# Patient Record
Sex: FEMALE | Race: BLACK OR AFRICAN AMERICAN | Employment: OTHER | URBAN - METROPOLITAN AREA
[De-identification: names, ages, dates, MRNs, and addresses within clinical notes are randomized per-mention and may not be internally consistent; named-entity substitution may affect disease eponyms.]

---

## 2017-10-16 ENCOUNTER — HOSPITAL ENCOUNTER (OUTPATIENT)
Dept: PREADMISSION TESTING | Age: 67
Discharge: HOME | End: 2017-10-16
Payer: MEDICARE

## 2017-10-16 VITALS
BODY MASS INDEX: 28.93 KG/M2 | WEIGHT: 180 LBS | OXYGEN SATURATION: 100 % | TEMPERATURE: 97.8 F | SYSTOLIC BLOOD PRESSURE: 152 MMHG | HEART RATE: 66 BPM | DIASTOLIC BLOOD PRESSURE: 78 MMHG | HEIGHT: 66 IN

## 2017-10-16 DIAGNOSIS — R73.03 PREDIABETES: ICD-10-CM

## 2017-10-16 LAB
ABO + RH BLD: NORMAL
ANION GAP SERPL CALC-SCNC: 6 MMOL/L (ref 5–15)
APPEARANCE UR: CLEAR
ATRIAL RATE: 58 BPM
BACTERIA URNS QL MICRO: NEGATIVE /HPF
BILIRUB UR QL: NEGATIVE
BLOOD GROUP ANTIBODIES SERPL: NORMAL
BUN SERPL-MCNC: 27 MG/DL (ref 6–20)
BUN/CREAT SERPL: 25 (ref 12–20)
CALCIUM SERPL-MCNC: 9 MG/DL (ref 8.5–10.1)
CALCULATED P AXIS, ECG09: 33 DEGREES
CALCULATED R AXIS, ECG10: -7 DEGREES
CALCULATED T AXIS, ECG11: 32 DEGREES
CHLORIDE SERPL-SCNC: 105 MMOL/L (ref 97–108)
CO2 SERPL-SCNC: 31 MMOL/L (ref 21–32)
COLOR UR: NORMAL
CREAT SERPL-MCNC: 1.06 MG/DL (ref 0.55–1.02)
DIAGNOSIS, 93000: NORMAL
EPITH CASTS URNS QL MICRO: NORMAL /LPF
ERYTHROCYTE [DISTWIDTH] IN BLOOD BY AUTOMATED COUNT: 13.7 % (ref 11.5–14.5)
EST. AVERAGE GLUCOSE BLD GHB EST-MCNC: 134 MG/DL
GLUCOSE SERPL-MCNC: 87 MG/DL (ref 65–100)
GLUCOSE UR STRIP.AUTO-MCNC: NEGATIVE MG/DL
HBA1C MFR BLD: 6.3 % (ref 4.2–6.3)
HCT VFR BLD AUTO: 36.5 % (ref 35–47)
HGB BLD-MCNC: 11.5 G/DL (ref 11.5–16)
HGB UR QL STRIP: NEGATIVE
HYALINE CASTS URNS QL MICRO: NORMAL /LPF (ref 0–5)
INR PPP: 1 (ref 0.9–1.1)
KETONES UR QL STRIP.AUTO: NEGATIVE MG/DL
LEUKOCYTE ESTERASE UR QL STRIP.AUTO: NEGATIVE
MCH RBC QN AUTO: 28.1 PG (ref 26–34)
MCHC RBC AUTO-ENTMCNC: 31.5 G/DL (ref 30–36.5)
MCV RBC AUTO: 89.2 FL (ref 80–99)
NITRITE UR QL STRIP.AUTO: NEGATIVE
P-R INTERVAL, ECG05: 172 MS
PH UR STRIP: 7 [PH] (ref 5–8)
PLATELET # BLD AUTO: 288 K/UL (ref 150–400)
POTASSIUM SERPL-SCNC: 3.9 MMOL/L (ref 3.5–5.1)
PROT UR STRIP-MCNC: NEGATIVE MG/DL
PROTHROMBIN TIME: 10.5 SEC (ref 9–11.1)
Q-T INTERVAL, ECG07: 440 MS
QRS DURATION, ECG06: 78 MS
QTC CALCULATION (BEZET), ECG08: 431 MS
RBC # BLD AUTO: 4.09 M/UL (ref 3.8–5.2)
RBC #/AREA URNS HPF: NORMAL /HPF (ref 0–5)
SODIUM SERPL-SCNC: 142 MMOL/L (ref 136–145)
SP GR UR REFRACTOMETRY: 1.01 (ref 1–1.03)
SPECIMEN EXP DATE BLD: NORMAL
UA: UC IF INDICATED,UAUC: NORMAL
UROBILINOGEN UR QL STRIP.AUTO: 0.2 EU/DL (ref 0.2–1)
VENTRICULAR RATE, ECG03: 58 BPM
WBC # BLD AUTO: 6.1 K/UL (ref 3.6–11)
WBC URNS QL MICRO: NORMAL /HPF (ref 0–4)

## 2017-10-16 PROCEDURE — 36415 COLL VENOUS BLD VENIPUNCTURE: CPT | Performed by: ORTHOPAEDIC SURGERY

## 2017-10-16 PROCEDURE — 85027 COMPLETE CBC AUTOMATED: CPT | Performed by: ORTHOPAEDIC SURGERY

## 2017-10-16 PROCEDURE — 86900 BLOOD TYPING SEROLOGIC ABO: CPT | Performed by: ORTHOPAEDIC SURGERY

## 2017-10-16 PROCEDURE — 85610 PROTHROMBIN TIME: CPT | Performed by: ORTHOPAEDIC SURGERY

## 2017-10-16 PROCEDURE — 93005 ELECTROCARDIOGRAM TRACING: CPT

## 2017-10-16 PROCEDURE — 83036 HEMOGLOBIN GLYCOSYLATED A1C: CPT | Performed by: ORTHOPAEDIC SURGERY

## 2017-10-16 PROCEDURE — 80048 BASIC METABOLIC PNL TOTAL CA: CPT | Performed by: ORTHOPAEDIC SURGERY

## 2017-10-16 PROCEDURE — 81001 URINALYSIS AUTO W/SCOPE: CPT | Performed by: ORTHOPAEDIC SURGERY

## 2017-10-16 RX ORDER — DICLOFENAC SODIUM 50 MG/1
1 TABLET, DELAYED RELEASE ORAL
COMMUNITY
Start: 2017-03-14 | End: 2019-03-22

## 2017-10-16 RX ORDER — ASPIRIN 81 MG/1
81 TABLET ORAL DAILY
COMMUNITY
End: 2017-11-02

## 2017-10-16 RX ORDER — TRAMADOL HYDROCHLORIDE 50 MG/1
50 TABLET ORAL
COMMUNITY
End: 2017-11-02

## 2017-10-16 RX ORDER — ATORVASTATIN CALCIUM 20 MG/1
20 TABLET, FILM COATED ORAL
COMMUNITY
End: 2017-10-16

## 2017-10-17 PROBLEM — R73.03 PREDIABETES: Status: ACTIVE | Noted: 2017-10-17

## 2017-10-17 PROBLEM — D47.2 MONOCLONAL PARAPROTEINEMIA: Status: ACTIVE | Noted: 2017-10-17

## 2017-10-17 LAB
BACTERIA SPEC CULT: NORMAL
BACTERIA SPEC CULT: NORMAL
SERVICE CMNT-IMP: NORMAL

## 2017-10-28 ENCOUNTER — ANESTHESIA EVENT (OUTPATIENT)
Dept: SURGERY | Age: 67
DRG: 470 | End: 2017-10-28
Payer: MEDICARE

## 2017-10-30 ENCOUNTER — ANESTHESIA (OUTPATIENT)
Dept: SURGERY | Age: 67
DRG: 470 | End: 2017-10-30
Payer: MEDICARE

## 2017-10-30 ENCOUNTER — HOSPITAL ENCOUNTER (INPATIENT)
Age: 67
LOS: 3 days | Discharge: HOME HEALTH | DRG: 470 | End: 2017-11-02
Attending: ORTHOPAEDIC SURGERY | Admitting: ORTHOPAEDIC SURGERY
Payer: MEDICARE

## 2017-10-30 PROBLEM — M17.12 OSTEOARTHRITIS OF LEFT KNEE: Status: ACTIVE | Noted: 2017-10-30

## 2017-10-30 LAB
GLUCOSE BLD STRIP.AUTO-MCNC: 71 MG/DL (ref 65–100)
GLUCOSE BLD STRIP.AUTO-MCNC: 75 MG/DL (ref 65–100)
GLUCOSE BLD STRIP.AUTO-MCNC: 85 MG/DL (ref 65–100)
GLUCOSE BLD STRIP.AUTO-MCNC: 88 MG/DL (ref 65–100)
SERVICE CMNT-IMP: NORMAL

## 2017-10-30 PROCEDURE — 76010000171 HC OR TIME 2 TO 2.5 HR INTENSV-TIER 1: Performed by: ORTHOPAEDIC SURGERY

## 2017-10-30 PROCEDURE — 77030028224 HC PDNG CST BSNM -A: Performed by: ORTHOPAEDIC SURGERY

## 2017-10-30 PROCEDURE — 77030007866 HC KT SPN ANES BBMI -B: Performed by: ANESTHESIOLOGY

## 2017-10-30 PROCEDURE — 74011000250 HC RX REV CODE- 250: Performed by: ORTHOPAEDIC SURGERY

## 2017-10-30 PROCEDURE — C1713 ANCHOR/SCREW BN/BN,TIS/BN: HCPCS | Performed by: ORTHOPAEDIC SURGERY

## 2017-10-30 PROCEDURE — 74011250637 HC RX REV CODE- 250/637: Performed by: ORTHOPAEDIC SURGERY

## 2017-10-30 PROCEDURE — 76210000017 HC OR PH I REC 1.5 TO 2 HR: Performed by: ORTHOPAEDIC SURGERY

## 2017-10-30 PROCEDURE — C9290 INJ, BUPIVACAINE LIPOSOME: HCPCS | Performed by: ORTHOPAEDIC SURGERY

## 2017-10-30 PROCEDURE — 77030011640 HC PAD GRND REM COVD -A: Performed by: ORTHOPAEDIC SURGERY

## 2017-10-30 PROCEDURE — 77030002933 HC SUT MCRYL J&J -A: Performed by: ORTHOPAEDIC SURGERY

## 2017-10-30 PROCEDURE — 77030008467 HC STPLR SKN COVD -B: Performed by: ORTHOPAEDIC SURGERY

## 2017-10-30 PROCEDURE — 77030018846 HC SOL IRR STRL H20 ICUM -A: Performed by: ORTHOPAEDIC SURGERY

## 2017-10-30 PROCEDURE — 82962 GLUCOSE BLOOD TEST: CPT

## 2017-10-30 PROCEDURE — 74011250636 HC RX REV CODE- 250/636

## 2017-10-30 PROCEDURE — 74011000258 HC RX REV CODE- 258: Performed by: ORTHOPAEDIC SURGERY

## 2017-10-30 PROCEDURE — 77030006822 HC BLD SAW SAG BRSM -B: Performed by: ORTHOPAEDIC SURGERY

## 2017-10-30 PROCEDURE — 74011250636 HC RX REV CODE- 250/636: Performed by: ORTHOPAEDIC SURGERY

## 2017-10-30 PROCEDURE — 74011000272 HC RX REV CODE- 272: Performed by: ORTHOPAEDIC SURGERY

## 2017-10-30 PROCEDURE — 65270000029 HC RM PRIVATE

## 2017-10-30 PROCEDURE — 77030018822 HC SLV COMPR FT COVD -B

## 2017-10-30 PROCEDURE — 74011000250 HC RX REV CODE- 250

## 2017-10-30 PROCEDURE — 77030012935 HC DRSG AQUACEL BMS -B: Performed by: ORTHOPAEDIC SURGERY

## 2017-10-30 PROCEDURE — 77030031139 HC SUT VCRL2 J&J -A: Performed by: ORTHOPAEDIC SURGERY

## 2017-10-30 PROCEDURE — 74011250636 HC RX REV CODE- 250/636: Performed by: ANESTHESIOLOGY

## 2017-10-30 PROCEDURE — 77030034850: Performed by: ORTHOPAEDIC SURGERY

## 2017-10-30 PROCEDURE — 76060000035 HC ANESTHESIA 2 TO 2.5 HR: Performed by: ORTHOPAEDIC SURGERY

## 2017-10-30 PROCEDURE — 77030018836 HC SOL IRR NACL ICUM -A: Performed by: ORTHOPAEDIC SURGERY

## 2017-10-30 PROCEDURE — 0SRD0J9 REPLACEMENT OF LEFT KNEE JOINT WITH SYNTHETIC SUBSTITUTE, CEMENTED, OPEN APPROACH: ICD-10-PCS | Performed by: ORTHOPAEDIC SURGERY

## 2017-10-30 PROCEDURE — 77030020788: Performed by: ORTHOPAEDIC SURGERY

## 2017-10-30 PROCEDURE — 77030010783 HC BOWL MX BN CEM J&J -B: Performed by: ORTHOPAEDIC SURGERY

## 2017-10-30 PROCEDURE — 77030000032 HC CUF TRNQT ZIMM -B: Performed by: ORTHOPAEDIC SURGERY

## 2017-10-30 PROCEDURE — C1776 JOINT DEVICE (IMPLANTABLE): HCPCS | Performed by: ORTHOPAEDIC SURGERY

## 2017-10-30 DEVICE — COMPONENT ARTC SURF CR 3-4 UNIV 42X56X10 MM KNEE CONSTRN YEL: Type: IMPLANTABLE DEVICE | Site: KNEE | Status: FUNCTIONAL

## 2017-10-30 DEVICE — PLATE TIB STEM PC NXGN SZ 3 --: Type: IMPLANTABLE DEVICE | Site: KNEE | Status: FUNCTIONAL

## 2017-10-30 DEVICE — KNEE SYS GSF FLX CEM FEM/TIB F -- K1 BSV-SC FLX ART SURF STD PAT: Type: IMPLANTABLE DEVICE | Status: FUNCTIONAL

## 2017-10-30 DEVICE — PLUG STEM TIV FLX TAPR FOR MG II ST BNE SCR NXGN: Type: IMPLANTABLE DEVICE | Site: KNEE | Status: FUNCTIONAL

## 2017-10-30 DEVICE — PAT STD POLYETH NXGN 8X29MM --: Type: IMPLANTABLE DEVICE | Site: KNEE | Status: FUNCTIONAL

## 2017-10-30 DEVICE — COMPONENT FEM SZ -E L KNEE ZMLY PMMA PC PRI PRESSFIT CRUCE: Type: IMPLANTABLE DEVICE | Site: KNEE | Status: FUNCTIONAL

## 2017-10-30 DEVICE — CEMENT BNE 40GM FULL DOSE PMMA W/ GENT HI VISC RADPQ LNG: Type: IMPLANTABLE DEVICE | Site: KNEE | Status: FUNCTIONAL

## 2017-10-30 RX ORDER — AMLODIPINE BESYLATE 5 MG/1
5 TABLET ORAL DAILY
Status: DISCONTINUED | OUTPATIENT
Start: 2017-10-31 | End: 2017-11-02 | Stop reason: HOSPADM

## 2017-10-30 RX ORDER — POTASSIUM CHLORIDE 750 MG/1
10-20 TABLET, FILM COATED, EXTENDED RELEASE ORAL DAILY
Status: DISCONTINUED | OUTPATIENT
Start: 2017-10-31 | End: 2017-10-31

## 2017-10-30 RX ORDER — ONDANSETRON 2 MG/ML
4 INJECTION INTRAMUSCULAR; INTRAVENOUS AS NEEDED
Status: DISCONTINUED | OUTPATIENT
Start: 2017-10-30 | End: 2017-10-30 | Stop reason: HOSPADM

## 2017-10-30 RX ORDER — LEVETIRACETAM 500 MG/1
1000 TABLET ORAL
Status: DISCONTINUED | OUTPATIENT
Start: 2017-10-30 | End: 2017-11-02 | Stop reason: HOSPADM

## 2017-10-30 RX ORDER — BUPIVACAINE HYDROCHLORIDE 7.5 MG/ML
INJECTION, SOLUTION EPIDURAL; RETROBULBAR AS NEEDED
Status: DISCONTINUED | OUTPATIENT
Start: 2017-10-30 | End: 2017-10-30 | Stop reason: HOSPADM

## 2017-10-30 RX ORDER — SODIUM CHLORIDE 0.9 % (FLUSH) 0.9 %
5-10 SYRINGE (ML) INJECTION AS NEEDED
Status: DISCONTINUED | OUTPATIENT
Start: 2017-10-30 | End: 2017-11-02 | Stop reason: HOSPADM

## 2017-10-30 RX ORDER — CELECOXIB 200 MG/1
200 CAPSULE ORAL ONCE
Status: COMPLETED | OUTPATIENT
Start: 2017-10-30 | End: 2017-10-30

## 2017-10-30 RX ORDER — HYDROMORPHONE HYDROCHLORIDE 1 MG/ML
0.2 INJECTION, SOLUTION INTRAMUSCULAR; INTRAVENOUS; SUBCUTANEOUS
Status: DISCONTINUED | OUTPATIENT
Start: 2017-10-30 | End: 2017-10-30 | Stop reason: HOSPADM

## 2017-10-30 RX ORDER — MORPHINE SULFATE 10 MG/ML
2 INJECTION, SOLUTION INTRAMUSCULAR; INTRAVENOUS
Status: DISCONTINUED | OUTPATIENT
Start: 2017-10-30 | End: 2017-10-30 | Stop reason: HOSPADM

## 2017-10-30 RX ORDER — SODIUM CHLORIDE, SODIUM LACTATE, POTASSIUM CHLORIDE, CALCIUM CHLORIDE 600; 310; 30; 20 MG/100ML; MG/100ML; MG/100ML; MG/100ML
INJECTION, SOLUTION INTRAVENOUS
Status: DISCONTINUED | OUTPATIENT
Start: 2017-10-30 | End: 2017-10-30 | Stop reason: HOSPADM

## 2017-10-30 RX ORDER — WARFARIN SODIUM 5 MG/1
5 TABLET ORAL ONCE
Status: COMPLETED | OUTPATIENT
Start: 2017-10-30 | End: 2017-10-30

## 2017-10-30 RX ORDER — LIDOCAINE HYDROCHLORIDE 20 MG/ML
INJECTION, SOLUTION EPIDURAL; INFILTRATION; INTRACAUDAL; PERINEURAL AS NEEDED
Status: DISCONTINUED | OUTPATIENT
Start: 2017-10-30 | End: 2017-10-30 | Stop reason: HOSPADM

## 2017-10-30 RX ORDER — LIDOCAINE HYDROCHLORIDE 10 MG/ML
0.1 INJECTION, SOLUTION EPIDURAL; INFILTRATION; INTRACAUDAL; PERINEURAL AS NEEDED
Status: DISCONTINUED | OUTPATIENT
Start: 2017-10-30 | End: 2017-10-30 | Stop reason: HOSPADM

## 2017-10-30 RX ORDER — ONDANSETRON 2 MG/ML
4 INJECTION INTRAMUSCULAR; INTRAVENOUS
Status: DISPENSED | OUTPATIENT
Start: 2017-10-30 | End: 2017-10-31

## 2017-10-30 RX ORDER — OXYCODONE HYDROCHLORIDE 5 MG/1
5 TABLET ORAL
Status: DISCONTINUED | OUTPATIENT
Start: 2017-10-30 | End: 2017-11-02

## 2017-10-30 RX ORDER — FENTANYL CITRATE 50 UG/ML
50 INJECTION, SOLUTION INTRAMUSCULAR; INTRAVENOUS AS NEEDED
Status: DISCONTINUED | OUTPATIENT
Start: 2017-10-30 | End: 2017-10-30 | Stop reason: HOSPADM

## 2017-10-30 RX ORDER — FACIAL-BODY WIPES
10 EACH TOPICAL DAILY PRN
Status: DISCONTINUED | OUTPATIENT
Start: 2017-11-01 | End: 2017-11-02 | Stop reason: HOSPADM

## 2017-10-30 RX ORDER — MIDAZOLAM HYDROCHLORIDE 1 MG/ML
0.5 INJECTION, SOLUTION INTRAMUSCULAR; INTRAVENOUS
Status: DISCONTINUED | OUTPATIENT
Start: 2017-10-30 | End: 2017-10-30 | Stop reason: HOSPADM

## 2017-10-30 RX ORDER — CEFAZOLIN SODIUM IN 0.9 % NACL 2 G/50 ML
2 INTRAVENOUS SOLUTION, PIGGYBACK (ML) INTRAVENOUS ONCE
Status: COMPLETED | OUTPATIENT
Start: 2017-10-30 | End: 2017-10-30

## 2017-10-30 RX ORDER — SODIUM CHLORIDE 0.9 % (FLUSH) 0.9 %
5-10 SYRINGE (ML) INJECTION AS NEEDED
Status: DISCONTINUED | OUTPATIENT
Start: 2017-10-30 | End: 2017-10-30 | Stop reason: HOSPADM

## 2017-10-30 RX ORDER — SODIUM CHLORIDE 9 MG/ML
25 INJECTION, SOLUTION INTRAVENOUS CONTINUOUS
Status: DISCONTINUED | OUTPATIENT
Start: 2017-10-30 | End: 2017-10-30 | Stop reason: HOSPADM

## 2017-10-30 RX ORDER — CEFAZOLIN SODIUM IN 0.9 % NACL 2 G/50 ML
2 INTRAVENOUS SOLUTION, PIGGYBACK (ML) INTRAVENOUS EVERY 8 HOURS
Status: COMPLETED | OUTPATIENT
Start: 2017-10-30 | End: 2017-10-31

## 2017-10-30 RX ORDER — DIPHENHYDRAMINE HYDROCHLORIDE 50 MG/ML
12.5 INJECTION, SOLUTION INTRAMUSCULAR; INTRAVENOUS AS NEEDED
Status: DISCONTINUED | OUTPATIENT
Start: 2017-10-30 | End: 2017-10-30 | Stop reason: HOSPADM

## 2017-10-30 RX ORDER — PANTOPRAZOLE SODIUM 40 MG/1
40 TABLET, DELAYED RELEASE ORAL
Status: DISCONTINUED | OUTPATIENT
Start: 2017-10-31 | End: 2017-11-02 | Stop reason: HOSPADM

## 2017-10-30 RX ORDER — DEXAMETHASONE SODIUM PHOSPHATE 4 MG/ML
4 INJECTION, SOLUTION INTRA-ARTICULAR; INTRALESIONAL; INTRAMUSCULAR; INTRAVENOUS; SOFT TISSUE ONCE
Status: DISCONTINUED | OUTPATIENT
Start: 2017-10-30 | End: 2017-10-30 | Stop reason: HOSPADM

## 2017-10-30 RX ORDER — AMOXICILLIN 250 MG
1 CAPSULE ORAL 2 TIMES DAILY
Status: DISCONTINUED | OUTPATIENT
Start: 2017-10-30 | End: 2017-11-02 | Stop reason: HOSPADM

## 2017-10-30 RX ORDER — HYDROMORPHONE HYDROCHLORIDE 1 MG/ML
0.5 INJECTION, SOLUTION INTRAMUSCULAR; INTRAVENOUS; SUBCUTANEOUS
Status: DISPENSED | OUTPATIENT
Start: 2017-10-30 | End: 2017-10-31

## 2017-10-30 RX ORDER — SODIUM CHLORIDE 9 MG/ML
1000 INJECTION, SOLUTION INTRAVENOUS CONTINUOUS
Status: DISCONTINUED | OUTPATIENT
Start: 2017-10-30 | End: 2017-10-30 | Stop reason: HOSPADM

## 2017-10-30 RX ORDER — ONDANSETRON 2 MG/ML
INJECTION INTRAMUSCULAR; INTRAVENOUS AS NEEDED
Status: DISCONTINUED | OUTPATIENT
Start: 2017-10-30 | End: 2017-10-30 | Stop reason: HOSPADM

## 2017-10-30 RX ORDER — ACETAMINOPHEN 325 MG/1
650 TABLET ORAL
Status: DISCONTINUED | OUTPATIENT
Start: 2017-10-30 | End: 2017-10-31

## 2017-10-30 RX ORDER — SODIUM CHLORIDE, SODIUM LACTATE, POTASSIUM CHLORIDE, CALCIUM CHLORIDE 600; 310; 30; 20 MG/100ML; MG/100ML; MG/100ML; MG/100ML
25 INJECTION, SOLUTION INTRAVENOUS CONTINUOUS
Status: DISCONTINUED | OUTPATIENT
Start: 2017-10-30 | End: 2017-10-30 | Stop reason: HOSPADM

## 2017-10-30 RX ORDER — FENTANYL CITRATE 50 UG/ML
25 INJECTION, SOLUTION INTRAMUSCULAR; INTRAVENOUS
Status: COMPLETED | OUTPATIENT
Start: 2017-10-30 | End: 2017-10-30

## 2017-10-30 RX ORDER — SODIUM CHLORIDE 0.9 % (FLUSH) 0.9 %
5-10 SYRINGE (ML) INJECTION EVERY 8 HOURS
Status: DISCONTINUED | OUTPATIENT
Start: 2017-10-30 | End: 2017-10-30 | Stop reason: HOSPADM

## 2017-10-30 RX ORDER — DEXTROSE, SODIUM CHLORIDE, SODIUM LACTATE, POTASSIUM CHLORIDE, AND CALCIUM CHLORIDE 5; .6; .31; .03; .02 G/100ML; G/100ML; G/100ML; G/100ML; G/100ML
25 INJECTION, SOLUTION INTRAVENOUS CONTINUOUS
Status: DISCONTINUED | OUTPATIENT
Start: 2017-10-30 | End: 2017-10-30 | Stop reason: HOSPADM

## 2017-10-30 RX ORDER — PROPOFOL 10 MG/ML
INJECTION, EMULSION INTRAVENOUS
Status: DISCONTINUED | OUTPATIENT
Start: 2017-10-30 | End: 2017-10-30 | Stop reason: HOSPADM

## 2017-10-30 RX ORDER — MIDAZOLAM HYDROCHLORIDE 1 MG/ML
INJECTION, SOLUTION INTRAMUSCULAR; INTRAVENOUS AS NEEDED
Status: DISCONTINUED | OUTPATIENT
Start: 2017-10-30 | End: 2017-10-30 | Stop reason: HOSPADM

## 2017-10-30 RX ORDER — OXYCODONE HYDROCHLORIDE 5 MG/1
10 TABLET ORAL
Status: DISCONTINUED | OUTPATIENT
Start: 2017-10-30 | End: 2017-11-02

## 2017-10-30 RX ORDER — MIDAZOLAM HYDROCHLORIDE 1 MG/ML
1 INJECTION, SOLUTION INTRAMUSCULAR; INTRAVENOUS AS NEEDED
Status: DISCONTINUED | OUTPATIENT
Start: 2017-10-30 | End: 2017-10-30 | Stop reason: HOSPADM

## 2017-10-30 RX ORDER — SODIUM CHLORIDE 0.9 % (FLUSH) 0.9 %
5-10 SYRINGE (ML) INJECTION EVERY 8 HOURS
Status: DISCONTINUED | OUTPATIENT
Start: 2017-10-31 | End: 2017-11-02 | Stop reason: HOSPADM

## 2017-10-30 RX ORDER — SODIUM CHLORIDE 9 MG/ML
125 INJECTION, SOLUTION INTRAVENOUS CONTINUOUS
Status: DISPENSED | OUTPATIENT
Start: 2017-10-30 | End: 2017-10-31

## 2017-10-30 RX ORDER — NALOXONE HYDROCHLORIDE 0.4 MG/ML
0.4 INJECTION, SOLUTION INTRAMUSCULAR; INTRAVENOUS; SUBCUTANEOUS AS NEEDED
Status: DISCONTINUED | OUTPATIENT
Start: 2017-10-30 | End: 2017-11-02 | Stop reason: HOSPADM

## 2017-10-30 RX ORDER — ACETAMINOPHEN 500 MG
1000 TABLET ORAL ONCE
Status: COMPLETED | OUTPATIENT
Start: 2017-10-30 | End: 2017-10-30

## 2017-10-30 RX ORDER — POLYETHYLENE GLYCOL 3350 17 G/17G
17 POWDER, FOR SOLUTION ORAL DAILY
Status: DISCONTINUED | OUTPATIENT
Start: 2017-10-31 | End: 2017-11-02 | Stop reason: HOSPADM

## 2017-10-30 RX ADMIN — SODIUM CHLORIDE, SODIUM LACTATE, POTASSIUM CHLORIDE, CALCIUM CHLORIDE: 600; 310; 30; 20 INJECTION, SOLUTION INTRAVENOUS at 14:20

## 2017-10-30 RX ADMIN — FENTANYL CITRATE 25 MCG: 50 INJECTION, SOLUTION INTRAMUSCULAR; INTRAVENOUS at 17:12

## 2017-10-30 RX ADMIN — CELECOXIB 200 MG: 200 CAPSULE ORAL at 13:54

## 2017-10-30 RX ADMIN — CEFAZOLIN 2 G: 1 INJECTION, POWDER, FOR SOLUTION INTRAMUSCULAR; INTRAVENOUS; PARENTERAL at 14:47

## 2017-10-30 RX ADMIN — MIDAZOLAM HYDROCHLORIDE 2 MG: 1 INJECTION, SOLUTION INTRAMUSCULAR; INTRAVENOUS at 14:08

## 2017-10-30 RX ADMIN — FENTANYL CITRATE 25 MCG: 50 INJECTION, SOLUTION INTRAMUSCULAR; INTRAVENOUS at 17:58

## 2017-10-30 RX ADMIN — FENTANYL CITRATE 25 MCG: 50 INJECTION, SOLUTION INTRAMUSCULAR; INTRAVENOUS at 17:17

## 2017-10-30 RX ADMIN — FENTANYL CITRATE 25 MCG: 50 INJECTION, SOLUTION INTRAMUSCULAR; INTRAVENOUS at 17:32

## 2017-10-30 RX ADMIN — MIDAZOLAM HYDROCHLORIDE 1 MG: 1 INJECTION, SOLUTION INTRAMUSCULAR; INTRAVENOUS at 15:38

## 2017-10-30 RX ADMIN — SODIUM CHLORIDE 125 ML/HR: 900 INJECTION, SOLUTION INTRAVENOUS at 18:22

## 2017-10-30 RX ADMIN — WARFARIN SODIUM 5 MG: 5 TABLET ORAL at 19:29

## 2017-10-30 RX ADMIN — MEPERIDINE HYDROCHLORIDE 12.5 MG: 25 INJECTION INTRAMUSCULAR; INTRAVENOUS; SUBCUTANEOUS at 17:08

## 2017-10-30 RX ADMIN — LIDOCAINE HYDROCHLORIDE 60 MG: 20 INJECTION, SOLUTION EPIDURAL; INFILTRATION; INTRACAUDAL; PERINEURAL at 14:48

## 2017-10-30 RX ADMIN — SODIUM CHLORIDE, SODIUM LACTATE, POTASSIUM CHLORIDE, AND CALCIUM CHLORIDE 25 ML/HR: 600; 310; 30; 20 INJECTION, SOLUTION INTRAVENOUS at 13:50

## 2017-10-30 RX ADMIN — ACETAMINOPHEN 1000 MG: 500 TABLET, FILM COATED ORAL at 13:54

## 2017-10-30 RX ADMIN — LEVETIRACETAM 1000 MG: 500 TABLET, FILM COATED ORAL at 21:45

## 2017-10-30 RX ADMIN — STANDARDIZED SENNA CONCENTRATE AND DOCUSATE SODIUM 1 TABLET: 8.6; 5 TABLET, FILM COATED ORAL at 19:29

## 2017-10-30 RX ADMIN — HYDROMORPHONE HYDROCHLORIDE 0.5 MG: 1 INJECTION, SOLUTION INTRAMUSCULAR; INTRAVENOUS; SUBCUTANEOUS at 19:29

## 2017-10-30 RX ADMIN — ONDANSETRON 4 MG: 2 INJECTION INTRAMUSCULAR; INTRAVENOUS at 20:24

## 2017-10-30 RX ADMIN — MIDAZOLAM HYDROCHLORIDE 1 MG: 1 INJECTION, SOLUTION INTRAMUSCULAR; INTRAVENOUS at 14:23

## 2017-10-30 RX ADMIN — HYDROMORPHONE HYDROCHLORIDE 0.2 MG: 1 INJECTION, SOLUTION INTRAMUSCULAR; INTRAVENOUS; SUBCUTANEOUS at 17:28

## 2017-10-30 RX ADMIN — OXYCODONE HYDROCHLORIDE 5 MG: 5 TABLET ORAL at 23:10

## 2017-10-30 RX ADMIN — ONDANSETRON 4 MG: 2 INJECTION INTRAMUSCULAR; INTRAVENOUS at 16:00

## 2017-10-30 RX ADMIN — CEFAZOLIN 2 G: 1 INJECTION, POWDER, FOR SOLUTION INTRAMUSCULAR; INTRAVENOUS; PARENTERAL at 22:51

## 2017-10-30 RX ADMIN — BUPIVACAINE HYDROCHLORIDE 12 MG: 7.5 INJECTION, SOLUTION EPIDURAL; RETROBULBAR at 14:32

## 2017-10-30 RX ADMIN — PROPOFOL 50 MCG/KG/MIN: 10 INJECTION, EMULSION INTRAVENOUS at 14:45

## 2017-10-30 RX ADMIN — FENTANYL CITRATE 100 MCG: 50 INJECTION, SOLUTION INTRAMUSCULAR; INTRAVENOUS at 14:07

## 2017-10-30 NOTE — PROGRESS NOTES
Pharmacist Note  Warfarin Dosing  Consult provided for this 78 y.o. 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 No lab exists for component: INREXT    Hgb, Hct & INR (1 Year)  Recent Labs      10/16/17   1017   HGB  11.5   HCT  36.5   INR  1.0     Date               INR                 Dose  10/16  1.0  ---  10/29  ---  None PreOp  10/30  ---  5 mg    Assessment/ Plan: Will order warfarin 5 mg PO x 1 dose. Pharmacy will continue to monitor daily and adjust therapy as indicated.

## 2017-10-30 NOTE — IP AVS SNAPSHOT
[address] [facility]  
[phone] Patient: Brian Valenzuela MRN: [id number] LRQ:35/49/3395 My Medications STOP taking these medications   
 aspirin delayed-release 81 mg tablet  
   
  
 traMADol 50 mg tablet Commonly known as:  ULTRAM  
   
  
  
TAKE these medications as instructed Instructions Each Dose to Equal  
 Morning Noon Evening Bedtime  
 diclofenac EC 50 mg EC tablet Commonly known as:  VOLTAREN Your last dose was: Your next dose is: Take 1 Tab by mouth daily as needed. 1 Tab KLOR-CON 10 10 mEq tablet Generic drug:  potassium chloride SR Your last dose was: Your next dose is: Take 10-20 mEq by mouth daily. 10-20 mEq  
    
   
   
   
  
 levETIRAcetam 500 mg tablet Commonly known as:  KEPPRA Your last dose was: Your next dose is: Take 1,000 mg by mouth nightly. 1000 mg  
    
   
   
   
  
 LIPITOR 20 mg tablet Generic drug:  atorvastatin Your last dose was: Your next dose is: Take 20 mg by mouth daily. 20 mg  
    
   
   
   
  
 losartan-hydroCHLOROthiazide 100-25 mg per tablet Commonly known as:  HYZAAR Your last dose was: Your next dose is: Take 1 Tab by mouth daily. 1 Tab NORVASC PO Your last dose was: Your next dose is: Take 5 mg by mouth daily. 5 mg  
    
   
   
   
  
 oxyCODONE IR 5 mg immediate release tablet Commonly known as:  Alfonso Morrow Your last dose was: Your next dose is: Take 0.5-1 Tabs by mouth every four (4) hours as needed. Max Daily Amount: 30 mg.  
 2.5-5 mg  
    
   
   
   
  
 PriLOSEC 20 mg capsule Generic drug:  omeprazole Your last dose was: Your next dose is: Take 20 mg by mouth daily.   
 20 mg  
 VITAMIN D3 1,000 unit Cap Generic drug:  cholecalciferol Your last dose was: Your next dose is: Take 2 Tabs by mouth daily (with dinner). 2 Tab  
    
   
   
   
  
 warfarin 2 mg tablet Commonly known as:  COUMADIN Start taking on:  11/3/2017 Your last dose was: Your next dose is:    
   
   
 Starting tomorrow Take 1.5 Tablet (3 mg) by mouth daily at 5 pm.  Take as directed, dose adjusted based on lab test.  Indications: [address]  Where to Get Your Medications Information on where to get these meds will be given to you by the nurse or doctor. ! Ask your nurse or doctor about these medications  
  oxyCODONE IR 5 mg immediate release tablet  
 warfarin 2 mg tablet

## 2017-10-30 NOTE — PERIOP NOTES
1414: LT leg adductor canal nerve block done by Dr Cooper Quintana with US guidance using 20cc of 0.5% ropivicaine, with pt monitored, O2 on @ 2l/m/nc and IV sedation given. Pt tolerated procedure fairly well. VSS post block: 139/68-70-12, SaO2 100% on 2l/m/nc. See anesthesia note.

## 2017-10-30 NOTE — ANESTHESIA PROCEDURE NOTES
Peripheral Block    Start time: 10/30/2017 2:05 PM  End time: 10/30/2017 2:12 PM  Performed by: Vel Schmidt  Authorized by: Vel Schmidt       Pre-procedure: Indications: at surgeon's request and post-op pain management    Preanesthetic Checklist: patient identified, risks and benefits discussed, site marked, timeout performed and patient being monitored      Block Type:   Block Type:   Adductor canal  Laterality:  Left  Monitoring:  Standard ASA monitoring, continuous pulse ox, frequent vital sign checks, heart rate, responsive to questions and oxygen  Injection Technique:  Single shot  Procedures: ultrasound guided    Patient Position: supine  Prep: chlorhexidine    Location:  Mid thigh  Needle Type:  Stimuplex  Needle Gauge:  22 G  Needle Localization:  Ultrasound guidance  Medication Injected:  0.5%  ropivacaine  Volume (mL):  20    Assessment:  Number of attempts:  1  Injection Assessment:  Incremental injection every 5 mL, local visualized surrounding nerve on ultrasound, negative aspiration for blood, no paresthesia and no intravascular symptoms  Patient tolerance:  Patient tolerated the procedure well with no immediate complications

## 2017-10-30 NOTE — PROGRESS NOTES
TRANSFER - IN REPORT:    Verbal report received from Leidy(name) on Olena Edgar  being received from PACU(unit) for routine post - op      Report consisted of patients Situation, Background, Assessment and   Recommendations(SBAR). Information from the following report(s) SBAR, Kardex, Intake/Output and MAR was reviewed with the receiving nurse. Opportunity for questions and clarification was provided. Assessment completed upon patients arrival to unit and care assumed.

## 2017-10-30 NOTE — ANESTHESIA POSTPROCEDURE EVALUATION
Post-Anesthesia Evaluation and Assessment    Patient: Juan F Cerna MRN: [id number]  SSN: [id number]    YOB: 1950  Age: 78 y.o. Sex: female       Cardiovascular Function/Vital Signs  Visit Vitals    /77    Pulse 80    Temp 36.4 °C (97.6 °F)    Resp 12    Ht 5' 5.5\" (1.664 m)    Wt 81.6 kg (179 lb 14.3 oz)    SpO2 99%    BMI 29.48 kg/m2       Patient is status post spinal anesthesia for Procedure(s):  LEFT TOTAL KNEE ARTHROPLASTY. Nausea/Vomiting: None    Postoperative hydration reviewed and adequate. Pain:  Pain Scale 1: Numeric (0 - 10) (10/30/17 1321)  Pain Intensity 1: 0 (10/30/17 1321)   Managed    Neurological Status:   Neuro (WDL): Within Defined Limits (hx seizures; none in past year) (10/30/17 1332)   At baseline    Mental Status and Level of Consciousness: Arousable    Pulmonary Status:   O2 Device: CO2 nasal cannula;Oral airway (10/30/17 1654)   Adequate oxygenation and airway patent    Complications related to anesthesia: None    Post-anesthesia assessment completed.  No concerns    Signed By: Dawit Turner MD     October 30, 2017

## 2017-10-30 NOTE — ANESTHESIA PREPROCEDURE EVALUATION
Anesthetic History   No history of anesthetic complications            Review of Systems / Medical History  Patient summary reviewed, nursing notes reviewed and pertinent labs reviewed    Pulmonary  Within defined limits                 Neuro/Psych   Within defined limits  seizures  CVA  TIA     Cardiovascular  Within defined limits  Hypertension                   GI/Hepatic/Renal  Within defined limits   GERD           Endo/Other  Within defined limits           Other Findings              Physical Exam    Airway  Mallampati: II  TM Distance: > 6 cm  Neck ROM: normal range of motion   Mouth opening: Normal     Cardiovascular  Regular rate and rhythm,  S1 and S2 normal,  no murmur, click, rub, or gallop             Dental    Dentition: Upper partial plate     Pulmonary  Breath sounds clear to auscultation               Abdominal  GI exam deferred       Other Findings            Anesthetic Plan    ASA: 2  Anesthesia type: spinal          Induction: Intravenous  Anesthetic plan and risks discussed with: Patient

## 2017-10-30 NOTE — ROUTINE PROCESS
Patient: Enrico Tierney MRN: [id number]  SSN: [id number]   YOB: 1950  Age: 78 y.o. Sex: female     Patient is status post Procedure(s):  LEFT TOTAL KNEE ARTHROPLASTY. Surgeon(s) and Role:     * Rannie Goodpasture, MD - Primary    Local/Dose/Irrigation:  See emar                    Peripheral IV 10/30/17 Right Wrist (Active)   Dressing Status Clean, dry, & intact 10/30/2017  1:49 PM   Dressing Type Transparent 10/30/2017  1:49 PM   Hub Color/Line Status Infusing 10/30/2017  1:49 PM                           Dressing/Packing:  Wound Knee Left-DRESSING TYPE: Cast padding;Elastic bandage;Staples; Other (Comment) (aquacel Ag) (10/30/17 9676)  Splint/Cast:  ]    Other:  Jones

## 2017-10-30 NOTE — PERIOP NOTES
Dr. Jazz Lyman aware pt prediabetic. BS 71 in PACU, given ginger ale. Pt ok to go to room, vitals stable pt alert and oriented.

## 2017-10-30 NOTE — OP NOTES
Name: Michael Leonard  MRN:  [id number]  : 1950  Age:  78 y.o. Surgery Date: 10/30/2017      OPERATIVE REPORT - LEFT TOTAL KNEE REPLACEMENT    PREOPERATIVE DIAGNOSIS: Osteoarthritis, left knee. POSTOPERATIVE DIAGNOSIS: Osteoarthritis, left knee. PROCEDURE PERFORMED: Left total knee arthroplasty. SURGEON: Dennis Martinez MD    FIRST ASSISTANT:  Elvi Mckeon    ANESTHESIA: Spinal    PRE-OP ANTIBIOTIC: Ancef 2g    COMPLICATIONS: None. ESTIMATED BLOOD LOSS: 50 mL. SPECIMENS REMOVED: None. COMPONENTS IMPLANTED:   Implant Name Type Inv. Item Serial No.  Lot No. LRB No. Used Action   CEMENT BNE GENTAMC GHV 40GM -- SMARTSET - SN/A  CEMENT BNE GENTAMC GHV 40GM -- SMARTSET N/A [facility] [employer] [id number] Left 2 Implanted   PLATE TIB STEM PC NXGN SZ 3 --  - SN/A  PLATE TIB STEM PC NXGN SZ 3 --  N/A [employer] [id number] Left 1 Implanted   PLUG STEM TAPR NEXGEN --  - SN/A  PLUG STEM TAPR NEXGEN --  N/A [employer] [id number] Left 1 Implanted   PAT STD POLYETH NXGN 8X29MM --  - SN/A  PAT STD POLYETH NXGN 8X29MM --  N/A [employer] [id number] Left 1 Implanted   COMPNT FEM PC THN NXGN FEM E --  - SN/A  COMPNT FEM PC THN NXGN FEM E --  N/A [employer] [id number] Left 1 Implanted   INSERT TIB ANTR AC SZ 3-4 10MM -- NEXGEN YEL - SN/A   INSERT TIB ANTR AC SZ 3-4 10MM -- NEXGEN YEL N/A [employer] [id number] Left 1 Implanted       INDICATIONS: The patient is an 78 yrs female with progressive debilitating left knee pain due to severe osteoarthritis. Symptoms have progressed despite comprehensive conservative treatment and she presents for left total knee replacement. Risks, benefits, alternatives of the procedure were reviewed with the patient in detail and the patient desires to proceed. The patient understands the risk for perioperative medical complications. DESCRIPTION OF PROCEDURE: Spinal anesthesia was initiated. Preoperative dose of antibiotic was given. Jones catheter was placed.  The left lower extremity was prepped and draped in the usual sterile fashion. The skin was covered with Ioban occlusive dressing. The left lower extremity was exsanguinated. A medial parapatellar incision was made to the left knee. The left knee was exposed and the distal femur was cut at 5 degrees distal femoral valgus. Femur was sized for a size E. An AP cutting block was placed, rotated based on bony landmarks. Femoral cuts were completed for a size E. The tibia was subluxed and a neutral varus/valgus proximal tibial cut was made matching the patient's slope. The meniscal remnants were removed. The posterior osteophytes were removed from the femur. Gaps were examined. The flexion and extension gaps were 10 mm. The femur was finished for a E, tibia for a 3. Trials were placed. Patella was cut and a 29 mm trial was fitted . The knee tracked well with all trial implants. The trials were then removed. Bony surfaces were drilled, lavaged, and dried. All components were cemented. Excess cement was removed. A 10 mm polyethylene component was placed. After the cement had fully cured, the knee was ranged and  irrigated copiously with pulsatile lavage. All surrounding soft tissues were infiltrated with local anesthetic. The arthrotomy was closed with #2 Vicryl sutures and 0 Vicryl sutures. Skin and subcutaneous tissues were irrigated and closed in standard fashion. Sterile dressing was applied. There were no complications. The procedure was a LEFT TOTAL KNEE REPLACEMENT. A Radha total knee construct was utilized. No specimens were obtained/sent. Rama Colin was of vital assisted throughout the duration of the procedure. The patient was transferred to the recovery room in stable condition.       Alex Mendoza MD

## 2017-10-30 NOTE — PERIOP NOTES
TRANSFER - OUT REPORT:    Verbal report given to Jocelyn Persaud RN(name) on Tejal Vidal  being transferred to (unit) for routine post - op       Report consisted of patients Situation, Background, Assessment and   Recommendations(SBAR). Time Pre op antibiotic given:1447  Anesthesia Stop time: 9684    Information from the following report(s) SBAR, OR Summary, Intake/Output, MAR, Recent Results and Med Rec Status was reviewed with the receiving nurse. Opportunity for questions and clarification was provided. Is the patient on 02? NO       L/Min        Other     Is the patient on a monitor? NO    Is the nurse transporting with the patient? NO    Surgical Waiting Area notified of patient's transfer from PACU?  YES      The following personal items collected during your admission accompanied patient upon transfer:   Dental Appliance:    Vision:    Hearing Aid:    Jewelry:    Clothing: Clothing:  (bag of clothes, glasses and dentures returned in PACU)  Other Valuables:    Valuables sent to safe:

## 2017-10-30 NOTE — IP AVS SNAPSHOT
[address] 
[phone] Patient: John Schafer MRN: [id number] MIF:75/20/4716 About your hospitalization You were admitted on:  October 30, 2017 You last received care in the:  [facility] You were discharged on:  November 2, 2017 Why you were hospitalized Your primary diagnosis was:  Osteoarthritis Of Left Knee Things You Need To Do (next 8 weeks) Follow up with [facility] Phone:  [phone] Where:  [address], [address] [facility] Follow up with Serafin Pritchard MD  
  
Phone:  [phone] Where:  [facility] [address], [address] [facility] Discharge Orders None A check laura indicates which time of day the medication should be taken. My Medications STOP taking these medications   
 aspirin delayed-release 81 mg tablet  
   
  
 traMADol 50 mg tablet Commonly known as:  ULTRAM  
   
  
  
TAKE these medications as instructed Instructions Each Dose to Equal  
 Morning Noon Evening Bedtime  
 diclofenac EC 50 mg EC tablet Commonly known as:  VOLTAREN Your last dose was: Your next dose is: Take 1 Tab by mouth daily as needed. 1 Tab KLOR-CON 10 10 mEq tablet Generic drug:  potassium chloride SR Your last dose was: Your next dose is: Take 10-20 mEq by mouth daily. 10-20 mEq  
    
   
   
   
  
 levETIRAcetam 500 mg tablet Commonly known as:  KEPPRA Your last dose was: Your next dose is: Take 1,000 mg by mouth nightly. 1000 mg  
    
   
   
   
  
 LIPITOR 20 mg tablet Generic drug:  atorvastatin Your last dose was: Your next dose is: Take 20 mg by mouth daily. 20 mg  
    
   
   
   
  
 losartan-hydroCHLOROthiazide 100-25 mg per tablet Commonly known as:  HYZAAR  
 Your last dose was: Your next dose is: Take 1 Tab by mouth daily. 1 Tab NORVASC PO Your last dose was: Your next dose is: Take 5 mg by mouth daily. 5 mg  
    
   
   
   
  
 oxyCODONE IR 5 mg immediate release tablet Commonly known as:  Danne Copper Your last dose was: Your next dose is: Take 0.5-1 Tabs by mouth every four (4) hours as needed. Max Daily Amount: 30 mg.  
 2.5-5 mg  
    
   
   
   
  
 PriLOSEC 20 mg capsule Generic drug:  omeprazole Your last dose was: Your next dose is: Take 20 mg by mouth daily. 20 mg  
    
   
   
   
  
 VITAMIN D3 1,000 unit Cap Generic drug:  cholecalciferol Your last dose was: Your next dose is: Take 2 Tabs by mouth daily (with dinner). 2 Tab  
    
   
   
   
  
 warfarin 2 mg tablet Commonly known as:  COUMADIN Start taking on:  11/3/2017 Your last dose was: Your next dose is:    
   
   
 Starting tomorrow Take 1.5 Tablet (3 mg) by mouth daily at 5 pm.  Take as directed, dose adjusted based on lab test.  Indications: [address]  Where to Get Your Medications Information on where to get these meds will be given to you by the nurse or doctor. ! Ask your nurse or doctor about these medications  
  oxyCODONE IR 5 mg immediate release tablet  
 warfarin 2 mg tablet Discharge Instructions After Hospital Care Plan:  Discharge Instructions Knee Replacement Dr. Lottie Chan Patient Name: Enrico Tierney Date of procedure: 10/30/2017 Procedure: Procedure(s): LEFT TOTAL KNEE ARTHROPLASTY Surgeon: Surgeon(s) and Role: Juan Reid MD - Primary PCP: Jose Raul Moore MD 
Date of discharge: No discharge date for patient encounter. Follow up appointments ? Follow up with Dr. Miguel Dias in 3 weeks. Call [phone] to make an appointment. ? If home health has been arranged for you the agency will contact you to arrange dates/times for visits. Please call them if you do not hear from them within 24 hours after you are discharged When to call your Orthopaedic Surgeon: Call [phone]. If you call after 5pm or on a weekend, the on call physician will be contacted ? Unrelieved pain ? Signs of infection-if your incision is red; continues to have drainage; drainage has a foul odor or if you have a persistent fever over 101 degrees ? Signs of a blood clot in your leg-calf pain, tenderness, redness, swelling of lower leg When to call your Primary Care Physician: 
? Concerns about medical conditions such as diabetes, high blood pressure, asthma, congestive heart failure ? Call if blood sugars are elevated, persistent headache or dizziness, coughing or congestion, constipation or diarrhea, burning with urination, abnormal heart rate When to call 421and go to the nearest emergency room ? Acute onset of chest pain, shortness of breath, difficulty breathing Activity ? Weight bearing as tolerated with walker or crutches. Refer to pages 23-31 of your handbook for instructions and pictures ? Complete your Home Exercise Program daily as instructed by your therapist.  Refer to pages 33-41 of your handbook for instructions and pictures ? Get up every one hour and walk (except at night when sleeping) ? Do not drive or operate heavy machinery Incision Care ? The Aquacel (brown, waterproof) surgical dressing is to remain on your knee for 7 days. On the 7th day have someone gently peel the dressing off by carefully lifting the edge and stretching it slightly to break the adhesive seal and leave incision open to air. You may take a shower with the Aquacel dressing in place.  
? If You have staples in your knee incision; they will be removed by the Home Health staff in 10-14 days and steri-strips will be applied. Leave the steri-strips on until they fall off Preventing blood clots ? Take Coumadin as prescribed  by Dr. Viraj Alfaro for one month following surgery Pain management ? Take pain medication as prescribed; decrease the amount you use as your pain lessens ? Avoid alcoholic beverages while taking pain medication ? Do not take any over-the-counter medication for pain except Tylenol (acetaminophen) ? Please be aware that many medications contain Tylenol. We do not want you to over medicate so please read the information below as a guide. Do not take more than 4 Grams of Tylenol in a 24 hour period. (There are 1000 milligrams in one Gram) 
o 325 mg of Tylenol per tablet (do not take more than 12 tablets in 24 hours) 
o 500 mg of Tylenol per tablet (do not take more than 8 tablets in 24 hours) 
o 650 mg of Tylenol per tablet (do not take more than 5 tablets in 24 hours). ? Elevate your leg (do not bend/flex knee) and place ice bags on your knee for 15-20 minutes after exercising and as needed throughout the day and night Diet ? Resume usual diet; drink plenty of fluids; eat foods high in fiber ? You may want to take a stool softener (such as Senokot-S or Colace) to prevent constipation while you are taking pain medication. If constipation occurs, take a laxative (such as Dulcolax tablets, Milk of Magnesia, or a suppository) Home Health Care Protocol (to be followed by [address]) Nursing-per physicians order ? Draw a PT/INR per physicians order and call results to Dr. Viraj Alfaro at [phone], extension [phone] ? If present please Remove staples 10-14 days post op and apply steri-strips ? Remove Aquacel dressing at 7 days post-op ? Complete head to toe assessment, vital signs ? Medication reconciliation ? Review pain management ? Manage chronic medical conditions Physical Therapy-per physicians order Weight bearing status: 
Precautions at Admission: Fall, WBAT Left Side Weight Bearing: As tolerated Right Side Weight Bearing: Full Mobility Status: 
Supine to Sit: Modified independent, Stand-by asssistance, Additional time Sit to Stand: Contact guard assistance, Additional time, Assist x1 Sit to Supine:  (pt remained OOB (to bathroom)) Bed to Chair: Contact guard assistance, Adaptive equipment, Additional time Gait: 
Distance (ft): 300 Feet (ft) Ambulation - Level of Assistance: Stand-by asssistance Assistive Device: Gait belt, Walker, rolling Gait Abnormalities: Circumduction, Decreased step clearance (mild antalgic gait;slight circumduction during L swing phase) ADL status overall composite: Toilet Transfer : Stand-by asssistance Physical Therapy ? Assessment and evaluation-bed mobility; functional transfers (bed, chair, bathroom, stairs); ambulation with equipment, car transfers, shower transfers, safety and ability to get out of house in the event of an emergency ? Review and maintain weight bearing as tolerated ? Discuss pain management ? Review how to do ADLs. Refer to page 42 of patient handbook Home Exercise Program-refer to pages 33-41 of the patient handbook for exercises Patient meets criteria for BUNDLED PAYMENT  
for Care Improvement Initiative Criteria Contact Information for Orthopedic Nurse Navigator:     
EMILEE Winslow, RN-BC 
[phone] X:[phone] B:[phone] [facility] SERVICES! Dear Presley Colvin: 
Thank you for requesting a PurePlay account. Our records indicate that you already have an active PurePlay account. You can access your account anytime at [email or web address] Did you know that you can access your hospital and ER discharge instructions at any time in PurePlay? You can also review all of your test results from your hospital stay or ER visit. Additional Information If you have questions, please visit the Frequently Asked Questions section of the [email or web address] website at [email or web address] Remember, [email or web address] is NOT to be used for urgent needs. For medical emergencies, dial 911. Now available from your iPhone and Android! Providers Seen During Your Hospitalization Provider Specialty Primary office phone Andrez Burgos MD Orthopedic Surgery [phone] Your Primary Care Physician (PCP) Primary Care Physician Office Phone Office Fax Karen Garcia [phone] You are allergic to the following No active allergies Recent Documentation Height Weight BMI OB Status Smoking Status 1.664 m 81.6 kg 29.48 kg/m2 Hysterectomy Never Smoker Emergency Contacts Name Discharge Info Relation Home Work Mobile [address] [facility] [place] CAREGIVER [3] Son [22] [phone] Alen Hunt DISCHARGE CAREGIVER [3] Other Relative [6] [phone] Patient Belongings The following personal items are in your possession at time of discharge: 
  Dental Appliances: Partials, Uppers  Visual Aid: Glasses      Home Medications: None   Jewelry: None  Clothing: At bedside    Other Valuables: Cell Phone Please provide this summary of care documentation to your next provider. Signatures-by signing, you are acknowledging that this After Visit Summary has been reviewed with you and you have received a copy. Patient Signature:  ____________________________________________________________ Date:  ____________________________________________________________  
  
Elizabeth Perez Provider Signature:  ____________________________________________________________ Date:  ____________________________________________________________

## 2017-10-30 NOTE — ANESTHESIA PROCEDURE NOTES
Spinal Block    Start time: 10/30/2017 2:27 PM  End time: 10/30/2017 2:32 PM  Performed by: Helen Lerma  Authorized by: Helen Lerma     Pre-procedure:   Indications: primary anesthetic  Preanesthetic Checklist: risks and benefits discussed and timeout performed      Spinal Block:   Patient Position:  Seated  Prep Region:  Lumbar  Prep: Betadine      Location:  L3-4  Technique:  Single shot  Local:  Lidocaine 1%  Local Dose (mL):  3    Needle:   Needle Type:  Pencil-tip  Needle Gauge:  25 G  Attempts:  1      Events: CSF confirmed, no blood with aspiration and no paresthesia        Assessment:  Insertion:  Uncomplicated  Patient tolerance:  Patient tolerated the procedure well with no immediate complications

## 2017-10-31 LAB
ANION GAP SERPL CALC-SCNC: 8 MMOL/L (ref 5–15)
BUN SERPL-MCNC: 13 MG/DL (ref 6–20)
BUN/CREAT SERPL: 13 (ref 12–20)
CALCIUM SERPL-MCNC: 7.8 MG/DL (ref 8.5–10.1)
CHLORIDE SERPL-SCNC: 103 MMOL/L (ref 97–108)
CO2 SERPL-SCNC: 21 MMOL/L (ref 21–32)
CREAT SERPL-MCNC: 1.01 MG/DL (ref 0.55–1.02)
GLUCOSE BLD STRIP.AUTO-MCNC: 113 MG/DL (ref 65–100)
GLUCOSE BLD STRIP.AUTO-MCNC: 117 MG/DL (ref 65–100)
GLUCOSE BLD STRIP.AUTO-MCNC: 131 MG/DL (ref 65–100)
GLUCOSE BLD STRIP.AUTO-MCNC: 174 MG/DL (ref 65–100)
GLUCOSE BLD STRIP.AUTO-MCNC: 71 MG/DL (ref 65–100)
GLUCOSE BLD STRIP.AUTO-MCNC: 72 MG/DL (ref 65–100)
GLUCOSE BLD STRIP.AUTO-MCNC: 73 MG/DL (ref 65–100)
GLUCOSE SERPL-MCNC: 125 MG/DL (ref 65–100)
HGB BLD-MCNC: 9.8 G/DL (ref 11.5–16)
INR BLD: 1.2 (ref 0.9–1.2)
POTASSIUM SERPL-SCNC: 3 MMOL/L (ref 3.5–5.1)
SERVICE CMNT-IMP: ABNORMAL
SERVICE CMNT-IMP: NORMAL
SODIUM SERPL-SCNC: 132 MMOL/L (ref 136–145)

## 2017-10-31 PROCEDURE — 85610 PROTHROMBIN TIME: CPT

## 2017-10-31 PROCEDURE — 74011250637 HC RX REV CODE- 250/637: Performed by: ORTHOPAEDIC SURGERY

## 2017-10-31 PROCEDURE — 97116 GAIT TRAINING THERAPY: CPT

## 2017-10-31 PROCEDURE — 80048 BASIC METABOLIC PNL TOTAL CA: CPT | Performed by: ORTHOPAEDIC SURGERY

## 2017-10-31 PROCEDURE — 65270000029 HC RM PRIVATE

## 2017-10-31 PROCEDURE — 97161 PT EVAL LOW COMPLEX 20 MIN: CPT

## 2017-10-31 PROCEDURE — 36415 COLL VENOUS BLD VENIPUNCTURE: CPT | Performed by: ORTHOPAEDIC SURGERY

## 2017-10-31 PROCEDURE — 85018 HEMOGLOBIN: CPT | Performed by: ORTHOPAEDIC SURGERY

## 2017-10-31 PROCEDURE — 74011250636 HC RX REV CODE- 250/636: Performed by: ORTHOPAEDIC SURGERY

## 2017-10-31 PROCEDURE — 82962 GLUCOSE BLOOD TEST: CPT

## 2017-10-31 PROCEDURE — 74011250636 HC RX REV CODE- 250/636: Performed by: NURSE PRACTITIONER

## 2017-10-31 PROCEDURE — 97530 THERAPEUTIC ACTIVITIES: CPT

## 2017-10-31 PROCEDURE — 74011250637 HC RX REV CODE- 250/637: Performed by: NURSE PRACTITIONER

## 2017-10-31 RX ORDER — KETOROLAC TROMETHAMINE 30 MG/ML
15 INJECTION, SOLUTION INTRAMUSCULAR; INTRAVENOUS
Status: COMPLETED | OUTPATIENT
Start: 2017-10-31 | End: 2017-10-31

## 2017-10-31 RX ORDER — WARFARIN SODIUM 5 MG/1
5 TABLET ORAL ONCE
Status: COMPLETED | OUTPATIENT
Start: 2017-10-31 | End: 2017-10-31

## 2017-10-31 RX ORDER — ACETAMINOPHEN 500 MG
1000 TABLET ORAL EVERY 6 HOURS
Status: DISCONTINUED | OUTPATIENT
Start: 2017-10-31 | End: 2017-11-02

## 2017-10-31 RX ORDER — DEXTROSE 50 % IN WATER (D50W) INTRAVENOUS SYRINGE
12.5-25 AS NEEDED
Status: DISCONTINUED | OUTPATIENT
Start: 2017-10-31 | End: 2017-11-02 | Stop reason: HOSPADM

## 2017-10-31 RX ORDER — CELECOXIB 200 MG/1
200 CAPSULE ORAL EVERY 12 HOURS
Status: DISCONTINUED | OUTPATIENT
Start: 2017-10-31 | End: 2017-11-02 | Stop reason: HOSPADM

## 2017-10-31 RX ORDER — MAGNESIUM SULFATE 100 %
4 CRYSTALS MISCELLANEOUS AS NEEDED
Status: DISCONTINUED | OUTPATIENT
Start: 2017-10-31 | End: 2017-11-02 | Stop reason: HOSPADM

## 2017-10-31 RX ORDER — POTASSIUM CHLORIDE 750 MG/1
20 TABLET, FILM COATED, EXTENDED RELEASE ORAL 2 TIMES DAILY
Status: DISCONTINUED | OUTPATIENT
Start: 2017-10-31 | End: 2017-11-01

## 2017-10-31 RX ADMIN — ACETAMINOPHEN 1000 MG: 500 TABLET, FILM COATED ORAL at 18:43

## 2017-10-31 RX ADMIN — OXYCODONE HYDROCHLORIDE 10 MG: 5 TABLET ORAL at 21:28

## 2017-10-31 RX ADMIN — Medication 10 ML: at 13:15

## 2017-10-31 RX ADMIN — OXYCODONE HYDROCHLORIDE 5 MG: 5 TABLET ORAL at 06:03

## 2017-10-31 RX ADMIN — STANDARDIZED SENNA CONCENTRATE AND DOCUSATE SODIUM 1 TABLET: 8.6; 5 TABLET, FILM COATED ORAL at 09:02

## 2017-10-31 RX ADMIN — KETOROLAC TROMETHAMINE 15 MG: 30 INJECTION, SOLUTION INTRAMUSCULAR at 13:15

## 2017-10-31 RX ADMIN — PANTOPRAZOLE SODIUM 40 MG: 40 TABLET, DELAYED RELEASE ORAL at 06:42

## 2017-10-31 RX ADMIN — WARFARIN SODIUM 5 MG: 5 TABLET ORAL at 12:15

## 2017-10-31 RX ADMIN — OXYCODONE HYDROCHLORIDE 10 MG: 5 TABLET ORAL at 18:43

## 2017-10-31 RX ADMIN — HYDROMORPHONE HYDROCHLORIDE 0.5 MG: 1 INJECTION, SOLUTION INTRAMUSCULAR; INTRAVENOUS; SUBCUTANEOUS at 14:44

## 2017-10-31 RX ADMIN — CEFAZOLIN 2 G: 1 INJECTION, POWDER, FOR SOLUTION INTRAMUSCULAR; INTRAVENOUS; PARENTERAL at 06:42

## 2017-10-31 RX ADMIN — LEVETIRACETAM 1000 MG: 500 TABLET, FILM COATED ORAL at 21:29

## 2017-10-31 RX ADMIN — AMLODIPINE BESYLATE 5 MG: 5 TABLET ORAL at 09:02

## 2017-10-31 RX ADMIN — OXYCODONE HYDROCHLORIDE 10 MG: 5 TABLET ORAL at 09:03

## 2017-10-31 RX ADMIN — SODIUM CHLORIDE 125 ML/HR: 900 INJECTION, SOLUTION INTRAVENOUS at 13:16

## 2017-10-31 RX ADMIN — SODIUM CHLORIDE 125 ML/HR: 900 INJECTION, SOLUTION INTRAVENOUS at 02:44

## 2017-10-31 RX ADMIN — ACETAMINOPHEN 1000 MG: 500 TABLET, FILM COATED ORAL at 12:15

## 2017-10-31 RX ADMIN — POTASSIUM CHLORIDE 20 MEQ: 750 TABLET, FILM COATED, EXTENDED RELEASE ORAL at 12:15

## 2017-10-31 RX ADMIN — POTASSIUM CHLORIDE 20 MEQ: 750 TABLET, FILM COATED, EXTENDED RELEASE ORAL at 18:43

## 2017-10-31 RX ADMIN — OXYCODONE HYDROCHLORIDE 5 MG: 5 TABLET ORAL at 02:44

## 2017-10-31 RX ADMIN — OXYCODONE HYDROCHLORIDE 10 MG: 5 TABLET ORAL at 12:15

## 2017-10-31 RX ADMIN — POLYETHYLENE GLYCOL 3350 17 G: 17 POWDER, FOR SOLUTION ORAL at 09:02

## 2017-10-31 RX ADMIN — CELECOXIB 200 MG: 200 CAPSULE ORAL at 21:29

## 2017-10-31 RX ADMIN — STANDARDIZED SENNA CONCENTRATE AND DOCUSATE SODIUM 1 TABLET: 8.6; 5 TABLET, FILM COATED ORAL at 18:43

## 2017-10-31 NOTE — PROGRESS NOTES
Complaints: pain is expected for this patient   Events: none      GEN:  NAD. AOx3   ABD:  S/NT/ND   LLE:  Dressing C/D/I    5/5 motor    Calf nttp (Bilat)    Sensate all distribution to light touch    1+ dp/pt pulses, foot perfused      Lab Results   Component Value Date/Time    HGB 11.5 10/16/2017 10:17 AM    INR 1.0 10/16/2017 10:17 AM       Lab Results   Component Value Date/Time    Sodium 142 10/16/2017 10:17 AM    Potassium 3.9 10/16/2017 10:17 AM    Chloride 105 10/16/2017 10:17 AM    CO2 31 10/16/2017 10:17 AM    BUN 27 10/16/2017 10:17 AM    Creatinine 1.06 10/16/2017 10:17 AM    Calcium 9.0 10/16/2017 10:17 AM            POD #1 LEFT TOTAL KNEE REPLACEMENT. Satisfactory progress. ABX: Complete today  PATHWAY: D/C Karen per protocol  DVT Prophylaxis: Coumadin (adjusted per pharmacy, target 1.7-2.2), SCD, KHANG   Weight Bearing: WBAT LLE   Pain Control: PRN oral narcotics  Anticipated Discharge Date: Thursday   Disposition: Home, HHPT.

## 2017-10-31 NOTE — PROGRESS NOTES
Bedside and Verbal shift change report given to annie (oncoming nurse) by Janice Steele (offgoing nurse). Report included the following information SBAR, Kardex, Procedure Summary, Intake/Output, MAR and Recent Results.

## 2017-10-31 NOTE — PROGRESS NOTES
Problem: Mobility Impaired (Adult and Pediatric)  Goal: *Acute Goals and Plan of Care (Insert Text)  Physical Therapy Goals  Initiated 10/31/2017    1. Patient will move from supine to sit and sit to supine  in bed with modified independence within 4 days. 2. Patient will perform sit to stand with modified independence within 4 days. 3. Patient will ambulate with modified independence for 200 feet with the least restrictive device within 4 days. 4. Patient will ascend/descend 5 stairs with 2 handrail(s) with modified independence within 4 days. 5. Patient will perform home exercise program per protocol with modified independence within 4 days. 6. Patient will demonstrate AROM 0-90 degrees in operative joint within 4 days. physical Therapy knee EVALUATION  Patient: Sia Welch (35 y.o. female)  Date: 10/31/2017  Primary Diagnosis: Arthritis of knee, left [M17.12]  Procedure(s) (LRB):  LEFT TOTAL KNEE ARTHROPLASTY (Left) 1 Day Post-Op   Precautions:   Fall, WBAT    ASSESSMENT :  Based on the objective data described below, the patient presents with decreased mobility and tolerance to activity due to pain related to L TKA POD1. She was reluctant to move at all, and in fact used the bedpan with nursing this morning, as she was unable to pivot to a bedside commode. She complains of severe L knee pain throughout transfer to the EOB and once sitting, needed considerable encouragement to even attempt standing. With the assistance and encouragement of her family, she was able to get to standing with the walker with mod to max assist of 2 but unable to stand fully upright. She reported feeling lightheaded and nauseated and returned to sitting EOB. At that point, she became more nauseated and needed several minutes sitting EOB before being able to return to supine, which also required max assist for the LLE. Her ROM is very limited by pain to -10 to 50, grossly.   Patient reports she went home with PT after her previous TKAs but disposition at this point is pending her progress and pain control. She does have family support at home and needs to manage stairs to get into the home. She has a walker already. Recommend OT consult and discussed with NP. We will follow up this afternoon to progress per pathway. Patient will benefit from skilled intervention to address the above impairments. Patients rehabilitation potential is considered to be Good, pending pain management  Factors which may influence rehabilitation potential include:   []         None noted  []         Mental ability/status  [x]         Medical condition  [x]         Home/family situation and support systems  []         Safety awareness  [x]         Pain tolerance/management  []         Other:      PLAN :  Recommendations and Planned Interventions:  [x]           Bed Mobility Training             []    Neuromuscular Re-Education  [x]           Transfer Training                   []    Orthotic/Prosthetic Training  [x]           Gait Training                         []    Modalities  [x]           Therapeutic Exercises           []    Edema Management/Control  [x]           Therapeutic Activities            [x]    Patient and Family Training/Education  []           Other (comment):    Frequency/Duration: Patient will be followed by physical therapy twice daily to address goals. Discharge Recommendations: To Be Determined  Further Equipment Recommendations for Discharge: none at this time     SUBJECTIVE:   Patient stated I have so much pain, I don't think I can do much.     OBJECTIVE DATA SUMMARY:   HISTORY:    Past Medical History:   Diagnosis Date    Arthritis     GERD (gastroesophageal reflux disease)     Hypercholesterolemia     Hypertension     Other ill-defined conditions(109.89)     MGUS (MONOCLONAL GAMMOPATHY OF UNCLEAR SIGNIFICANCE)    Prediabetes     Seizures ([facility] [address] 2016    PER NEUROLOGIST ABSENCE SEIZURES, patient states    [facility]    TIA    Syncope and collapse     loop recorder placed 4/2016 (syncope of unknown etiology)     Past Surgical History:   Procedure Laterality Date    HX APPENDECTOMY      REMOVED DURING HYSTERECTOMY    HX COLONOSCOPY      HX DILATION AND CURETTAGE      HX HYSTERECTOMY  1996    HX IMPLANTABLE LOOP RECORDER  04/29/2016    HX KNEE ARTHROSCOPY  12/17/2009/ AND 2008    RIGHT KNEE    HX KNEE REPLACEMENT Right 2013 2016    HX ORTHOPAEDIC  9/09 AND  5/24/2011    RIGHT TOTAL KNEE    HX ORTHOPAEDIC  1/31/2011   10/24/2011    REMOVED KNEE REPLACEMENT    HX ORTHOPAEDIC Left 09/05/2017    HAND    HX ROTATOR CUFF REPAIR Right 2008    HX SHOULDER ARTHROSCOPY  2003    RIGHT     Prior Level of Function/Home Situation: independent with cane, lives with family  Personal factors and/or comorbidities impacting plan of care: pain, L TKA, R TKA with revision    Home Situation  Home Environment: Private residence  One/Two Story Residence: One story  Living Alone: No  Support Systems: Child(rula)  Patient Expects to be Discharged to[de-identified] Private residence  Current DME Used/Available at Home: rene Quintanilla, Walker    EXAMINATION/PRESENTATION/DECISION MAKING:   Critical Behavior:  Neurologic State: Appropriate for age           Hearing: Auditory  Auditory Impairment: None  Skin:  Post op dressing in place  Range Of Motion:  AROM: Generally decreased, functional (very limited by pain in the L knee -10 to 50)                       Strength:    Strength: Generally decreased, functional                    Tone & Sensation:   Tone: Normal              Sensation: Intact               Coordination:  Coordination: Within functional limits  Vision:      Functional Mobility:  Bed Mobility:     Supine to Sit: Maximum assistance; Additional time  Sit to Supine: Maximum assistance; Additional time     Transfers:  Sit to Stand: Moderate assistance;Assist x2;Adaptive equipment; Additional time  Stand to Sit: Moderate assistance; Adaptive equipment; Additional time;Assist x2                       Balance:   Sitting: Intact; Without support  Standing: Impaired; With support  Standing - Static: Constant support;Poor (unable to stand erect, became nauseated)  Standing - Dynamic : Poor  Ambulation/Gait Training:                                                                  Therapeutic Exercises: Ankle pumps were all she could tolerate at this time    Functional Measure:  Barthel Index:    Bathin (inferred, limited by pain at this time)  Bladder: 0  Bowels: 0  Groomin  Dressin  Feeding: 10  Mobility: 0  Stairs: 0  Toilet Use: 0  Transfer (Bed to Chair and Back): 5 (inferred)  Total: 20       Barthel and G-code impairment scale:  Percentage of impairment CH  0% CI  1-19% CJ  20-39% CK  40-59% CL  60-79% CM  80-99% CN  100%   Barthel Score 0-100 100 99-80 79-60 59-40 20-39 1-19   0   Barthel Score 0-20 20 17-19 13-16 9-12 5-8 1-4 0      The Barthel ADL Index: Guidelines  1. The index should be used as a record of what a patient does, not as a record of what a patient could do. 2. The main aim is to establish degree of independence from any help, physical or verbal, however minor and for whatever reason. 3. The need for supervision renders the patient not independent. 4. A patient's performance should be established using the best available evidence. Asking the patient, friends/relatives and nurses are the usual sources, but direct observation and common sense are also important. However direct testing is not needed. 5. Usually the patient's performance over the preceding 24-48 hours is important, but occasionally longer periods will be relevant. 6. Middle categories imply that the patient supplies over 50 per cent of the effort. 7. Use of aids to be independent is allowed. Farzana Mtz., Barthel, D.W. (4648). Functional evaluation: the Barthel Index. [address] [facility] (14)2.   Delilah Becker eli YOSSI Schmid, Heike Story., Goldie Moffett., Jackson Ulloa. (1999). Measuring the change indisability after inpatient rehabilitation; comparison of the responsiveness of the Barthel Index and Functional [place] Measure. Journal of Neurology, Neurosurgery, and Psychiatry, 66(4), 542-828. MARCELA Angel, SHELDON Padilla, & Minh Healy M.A. (2004.) Assessment of post-stroke quality of life in cost-effectiveness studies: The usefulness of the Barthel Index and the EuroQoL-5D. Quality of Life Research, 13, 128-90       G codes: In compliance with CMSs Claims Based Outcome Reporting, the following G-code set was chosen for this patient based on their primary functional limitation being treated: The outcome measure chosen to determine the severity of the functional limitation was the Barthel with a score of 20/100 which was correlated with the impairment scale.     ? Mobility - Walking and Moving Around:     - CURRENT STATUS: CL - 60%-79% impaired, limited or restricted    - GOAL STATUS: CI - 1%-19% impaired, limited or restricted    - D/C STATUS:  ---------------To be determined---------------        Physical Therapy Evaluation Charge Determination   History Examination Presentation Decision-Making   MEDIUM  Complexity : 1-2 comorbidities / personal factors will impact the outcome/ POC  LOW Complexity : 1-2 Standardized tests and measures addressing body structure, function, activity limitation and / or participation in recreation  MEDIUM Complexity : Evolving with changing characteristics  MEDIUM Complexity : FOTO score of 26-74      Based on the above components, the patient evaluation is determined to be of the following complexity level: LOW     Pain:  Pain Scale 1: Numeric (0 - 10)  Pain Intensity 1: 9  Pain Location 1: Knee  Pain Orientation 1: Left  Pain Description 1: Aching  Pain Intervention(s) 1: Medication (see MAR)  Activity Tolerance:   Poor, limited by pain and nausea  Please refer to the flowsheet for vital signs taken during this treatment. After treatment:   []         Patient left in no apparent distress sitting up in chair  [x]         Patient left in no apparent distress in bed  [x]         Call bell left within reach  [x]         Nursing notified  [x]         Caregiver present, family  []         Bed alarm activated    COMMUNICATION/EDUCATION:   The patients plan of care was discussed with: Occupational Therapist, Registered Nurse and NP. [x]         Fall prevention education was provided and the patient/caregiver indicated understanding. [x]         Patient/family have participated as able in goal setting and plan of care. [x]         Patient/family agree to work toward stated goals and plan of care. []         Patient understands intent and goals of therapy, but is neutral about his/her participation. []         Patient is unable to participate in goal setting and plan of care.     Thank you for this referral.  Erika Manrique, PT   Time Calculation: 24 mins

## 2017-10-31 NOTE — PROGRESS NOTES
Problem: Mobility Impaired (Adult and Pediatric)  Goal: *Acute Goals and Plan of Care (Insert Text)  Physical Therapy Goals  Initiated 10/31/2017    1. Patient will move from supine to sit and sit to supine  in bed with modified independence within 4 days. 2. Patient will perform sit to stand with modified independence within 4 days. 3. Patient will ambulate with modified independence for 200 feet with the least restrictive device within 4 days. 4. Patient will ascend/descend 5 stairs with 2 handrail(s) with modified independence within 4 days. 5. Patient will perform home exercise program per protocol with modified independence within 4 days. 6. Patient will demonstrate AROM 0-90 degrees in operative joint within 4 days. physical Therapy TREATMENT  Patient: Olena Edgar (60 y.o. female)  Date: 10/31/2017  Diagnosis: Arthritis of knee, left [M17.12] Osteoarthritis of left knee  Procedure(s) (LRB):  LEFT TOTAL KNEE ARTHROPLASTY (Left) 1 Day Post-Op  Precautions: Fall, WBAT    ASSESSMENT:  Patient up in chair in tears due to pain. IV dilaudid was given by RN immediately prior to mobility. She was able to stand and tolerate weight bearing well and therapist suggested she attempt ambulation to relieve pain and she was able to walk [de-identified]' with the walker and reported decreased pain with ambulation. Note that she has decreased ROM in bilateral knees related to new L TKA and previous R TKA and this impacts her sit to stand transfers considerably. Suggested she continue to mobilize over the evening with nursing as able and we will follow up in the morning to work more on ROM and mobility per pathway. Progression toward goals:  []      Improving appropriately and progressing toward goals  [x]      Improving slowly and progressing toward goals  []      Not making progress toward goals and plan of care will be adjusted     PLAN:  Patient continues to benefit from skilled intervention to address the above impairments. Continue treatment per established plan of care. Discharge Recommendations:  Home Health  Further Equipment Recommendations for Discharge:  none     SUBJECTIVE:   Pain improved after IV dilaudid and ambulation    OBJECTIVE DATA SUMMARY:   Range of Motion:  AROM: Generally decreased, functional (very limited by pain in the L knee -10 to 50)                       Functional Mobility Training:  Bed Mobility:     Supine to Sit: Maximum assistance; Additional time  Sit to Supine: Minimum assistance; Adaptive equipment; Additional time (using strap)           Transfers:  Sit to Stand: Minimum assistance; Additional time; Adaptive equipment  Stand to Sit: Contact guard assistance; Adaptive equipment; Additional time                             Ambulation/Gait Training:  Distance (ft): 80 Feet (ft)  Assistive Device: Gait belt;Walker, rolling  Ambulation - Level of Assistance: Contact guard assistance; Adaptive equipment; Additional time        Gait Abnormalities: Antalgic;Decreased step clearance;Trunk sway increased  Right Side Weight Bearing: Full  Left Side Weight Bearing: As tolerated  Base of Support: Widened  Stance: Left decreased  Speed/Henny: Pace decreased (<100 feet/min); Slow  Step Length: Left shortened;Right shortened  Swing Pattern: Left asymmetrical;Right asymmetrical (bilateral knee flexion in stance, minimal swing)                 Stairs: Therapeutic Exercises:   Exercises performed per protocol. See morning treatment note for description. Pain:  Pain Scale 1: Numeric (0 - 10)  Pain Intensity 1: 6  Pain Location 1: Knee  Pain Orientation 1: Left  Pain Description 1: Aching  Pain Intervention(s) 1: Medication (see MAR)  Activity Tolerance:   Improved with iv pain meds  Please refer to the flowsheet for vital signs taken during this treatment.   After treatment:   [] Patient left in no apparent distress sitting up in chair  [x] Patient left in no apparent distress in bed  [x] Call bell left within reach  [x] Nursing notified  [] Caregiver present  [] Bed alarm activated    COMMUNICATION/COLLABORATION:   The patients plan of care was discussed with: Registered Nurse    Jordan Rubio, PT   Time Calculation: 35 mins

## 2017-10-31 NOTE — PROGRESS NOTES
Not able to draw labs after two attempts from staff. ICU called for assistance, they will come when staff is available.

## 2017-10-31 NOTE — PROGRESS NOTES
Bedside shift change report given to Mya(oncoming nurse) by Monico Palmer (offgoing nurse). Report included the following information SBAR and Kardex.

## 2017-10-31 NOTE — PROGRESS NOTES
Pharmacist Note  Warfarin Dosing  Consult provided for this 78 y.o. female to manage warfarin for VTE PPx s/p LTK  - H/o TIA in 2006  - Not on anticoagulation at home    INR Goal: 1.7- 2.2  Therapy Day: 2    Drugs that may increase INR: None  Drugs that may decrease INR: None  Other current anticoagulants/ drugs that may increase bleeding risk: NSAID  Risk factors: Age > 65  Daily INR ordered: YES  Recent Labs      10/31/17   0938   INR  1.2*       Date               INR                 Dose  10/16  1.0  ---  10/29  ---  None PreOp  10/30  ---  5 mg  10/31  1.2  5 mg     Assessment/ Plan: Will order warfarin 5 mg PO x 1 dose. Pharmacy will continue to monitor daily and adjust therapy as indicated.

## 2017-10-31 NOTE — PROGRESS NOTES
Spiritual Care Partner Volunteer visited patient in 0 on 10.31.17.   Documented by:    [id number] Nasir Brown M.Div M.S, Radha 602 available at [phone])

## 2017-10-31 NOTE — PROGRESS NOTES
Bedside and Verbal shift change report given to Elizabeth Schumacher (oncoming nurse) by Brenna Sanchez (offgoing nurse). Report included the following information SBAR, Kardex, Intake/Output, MAR and Recent Results.

## 2017-10-31 NOTE — ROUTINE PROCESS
Bedside shift change report given to Paddy Nuno (oncoming nurse) by Kenan Morrison (offgoing nurse). Report included the following information SBAR.

## 2017-11-01 LAB
ANION GAP SERPL CALC-SCNC: 6 MMOL/L (ref 5–15)
BUN SERPL-MCNC: 14 MG/DL (ref 6–20)
BUN/CREAT SERPL: 14 (ref 12–20)
CALCIUM SERPL-MCNC: 7.9 MG/DL (ref 8.5–10.1)
CHLORIDE SERPL-SCNC: 106 MMOL/L (ref 97–108)
CO2 SERPL-SCNC: 27 MMOL/L (ref 21–32)
CREAT SERPL-MCNC: 1.02 MG/DL (ref 0.55–1.02)
GLUCOSE BLD STRIP.AUTO-MCNC: 106 MG/DL (ref 65–100)
GLUCOSE BLD STRIP.AUTO-MCNC: 109 MG/DL (ref 65–100)
GLUCOSE BLD STRIP.AUTO-MCNC: 123 MG/DL (ref 65–100)
GLUCOSE BLD STRIP.AUTO-MCNC: 91 MG/DL (ref 65–100)
GLUCOSE SERPL-MCNC: 99 MG/DL (ref 65–100)
HGB BLD-MCNC: 9.3 G/DL (ref 11.5–16)
INR PPP: 1.6 (ref 0.9–1.1)
POTASSIUM SERPL-SCNC: 3.7 MMOL/L (ref 3.5–5.1)
PROTHROMBIN TIME: 16.1 SEC (ref 9–11.1)
SERVICE CMNT-IMP: ABNORMAL
SERVICE CMNT-IMP: NORMAL
SODIUM SERPL-SCNC: 139 MMOL/L (ref 136–145)

## 2017-11-01 PROCEDURE — 74011250637 HC RX REV CODE- 250/637: Performed by: ORTHOPAEDIC SURGERY

## 2017-11-01 PROCEDURE — 74011250637 HC RX REV CODE- 250/637: Performed by: NURSE PRACTITIONER

## 2017-11-01 PROCEDURE — 85610 PROTHROMBIN TIME: CPT | Performed by: ORTHOPAEDIC SURGERY

## 2017-11-01 PROCEDURE — 80048 BASIC METABOLIC PNL TOTAL CA: CPT | Performed by: NURSE PRACTITIONER

## 2017-11-01 PROCEDURE — 97116 GAIT TRAINING THERAPY: CPT

## 2017-11-01 PROCEDURE — 85018 HEMOGLOBIN: CPT | Performed by: ORTHOPAEDIC SURGERY

## 2017-11-01 PROCEDURE — 65270000029 HC RM PRIVATE

## 2017-11-01 PROCEDURE — 97110 THERAPEUTIC EXERCISES: CPT

## 2017-11-01 PROCEDURE — 82962 GLUCOSE BLOOD TEST: CPT

## 2017-11-01 PROCEDURE — 36415 COLL VENOUS BLD VENIPUNCTURE: CPT | Performed by: ORTHOPAEDIC SURGERY

## 2017-11-01 PROCEDURE — 97530 THERAPEUTIC ACTIVITIES: CPT

## 2017-11-01 PROCEDURE — 97535 SELF CARE MNGMENT TRAINING: CPT

## 2017-11-01 PROCEDURE — 97165 OT EVAL LOW COMPLEX 30 MIN: CPT

## 2017-11-01 RX ORDER — POTASSIUM CHLORIDE 750 MG/1
20 TABLET, FILM COATED, EXTENDED RELEASE ORAL DAILY
Status: DISCONTINUED | OUTPATIENT
Start: 2017-11-02 | End: 2017-11-02 | Stop reason: HOSPADM

## 2017-11-01 RX ORDER — ADHESIVE BANDAGE
30 BANDAGE TOPICAL ONCE
Status: ACTIVE | OUTPATIENT
Start: 2017-11-01 | End: 2017-11-02

## 2017-11-01 RX ADMIN — ACETAMINOPHEN 1000 MG: 500 TABLET, FILM COATED ORAL at 07:00

## 2017-11-01 RX ADMIN — OXYCODONE HYDROCHLORIDE 10 MG: 5 TABLET ORAL at 03:58

## 2017-11-01 RX ADMIN — PANTOPRAZOLE SODIUM 40 MG: 40 TABLET, DELAYED RELEASE ORAL at 07:00

## 2017-11-01 RX ADMIN — ACETAMINOPHEN 1000 MG: 500 TABLET, FILM COATED ORAL at 18:55

## 2017-11-01 RX ADMIN — OXYCODONE HYDROCHLORIDE 10 MG: 5 TABLET ORAL at 13:06

## 2017-11-01 RX ADMIN — ACETAMINOPHEN 1000 MG: 500 TABLET, FILM COATED ORAL at 13:06

## 2017-11-01 RX ADMIN — POLYETHYLENE GLYCOL 3350 17 G: 17 POWDER, FOR SOLUTION ORAL at 08:32

## 2017-11-01 RX ADMIN — STANDARDIZED SENNA CONCENTRATE AND DOCUSATE SODIUM 1 TABLET: 8.6; 5 TABLET, FILM COATED ORAL at 17:10

## 2017-11-01 RX ADMIN — OXYCODONE HYDROCHLORIDE 10 MG: 5 TABLET ORAL at 10:01

## 2017-11-01 RX ADMIN — CELECOXIB 200 MG: 200 CAPSULE ORAL at 08:32

## 2017-11-01 RX ADMIN — LEVETIRACETAM 1000 MG: 500 TABLET, FILM COATED ORAL at 22:40

## 2017-11-01 RX ADMIN — WARFARIN SODIUM 3 MG: 2 TABLET ORAL at 13:06

## 2017-11-01 RX ADMIN — OXYCODONE HYDROCHLORIDE 10 MG: 5 TABLET ORAL at 07:00

## 2017-11-01 RX ADMIN — POTASSIUM CHLORIDE 20 MEQ: 750 TABLET, FILM COATED, EXTENDED RELEASE ORAL at 08:32

## 2017-11-01 RX ADMIN — CELECOXIB 200 MG: 200 CAPSULE ORAL at 22:40

## 2017-11-01 RX ADMIN — STANDARDIZED SENNA CONCENTRATE AND DOCUSATE SODIUM 1 TABLET: 8.6; 5 TABLET, FILM COATED ORAL at 08:32

## 2017-11-01 RX ADMIN — OXYCODONE HYDROCHLORIDE 10 MG: 5 TABLET ORAL at 16:03

## 2017-11-01 RX ADMIN — AMLODIPINE BESYLATE 5 MG: 5 TABLET ORAL at 08:32

## 2017-11-01 RX ADMIN — OXYCODONE HYDROCHLORIDE 10 MG: 5 TABLET ORAL at 22:40

## 2017-11-01 NOTE — PROGRESS NOTES
Occupational Therapy EVALUATION/discharge  Patient: Therese Gibbs (68 y.o. female)  Date: 11/1/2017  Primary Diagnosis: Arthritis of knee, left [M17.12]  Procedure(s) (LRB):  LEFT TOTAL KNEE ARTHROPLASTY (Left) 2 Days Post-Op   Precautions:   Fall, WBAT    ASSESSMENT:   Based on the objective data described below, the patient presents with ability to complete basic ADLs with SBA-Mod I and functional ambulation/transfers with CG-Supervision. Pt was received sitting on toilet following PT session, pleasant and agreeable to OT eval however c/o 7/10 pain in LLE. Pt lives alone at baseline however reports her son and friend will be available during the day for the next few weeks to assist with ADLs/IADLs as needed. Pt completed UB ADLs with Mod I, LB ADLs with SBA (Min-ModA with socks). Educated pt on LB AE to increase independence with LB ADLs, pt with good understanding, reports she has shoe horn at home, interested in acquiring reacher but politely declining sock aide education, reporting she will \"just ask for help\" with this task. Pt needed ModA to manage BLEs during sit to supine transfer this session, reports she has been using strap but it is \"uncomfortable\", will simply ask for help with this task at home. Recommend pt d/c home with no further OT and assistance from family/friends as needed. Pt with no overt questions or concerns regarding self-care management at home. Further skilled acute occupational therapy is not indicated at this time. Discharge Recommendations: None  Further Equipment Recommendations for Discharge: None       SUBJECTIVE:   Patient stated This knee hurts more than my previous surgeries.     OBJECTIVE DATA SUMMARY:   HISTORY:   Past Medical History:   Diagnosis Date    Arthritis     GERD (gastroesophageal reflux disease)     Hypercholesterolemia     Hypertension     Other ill-defined conditions(716.28)     MGUS (MONOCLONAL GAMMOPATHY OF UNCLEAR SIGNIFICANCE)    Prediabetes  Seizures ([facility] [address] 2016    PER NEUROLOGIST ABSENCE SEIZURES, patient states    Stroke [facility]) 2006    TIA    Syncope and collapse     loop recorder placed 4/2016 (syncope of unknown etiology)     Past Surgical History:   Procedure Laterality Date    HX APPENDECTOMY      REMOVED DURING HYSTERECTOMY    HX COLONOSCOPY      HX DILATION AND CURETTAGE      HX HYSTERECTOMY  1996    HX IMPLANTABLE LOOP RECORDER  04/29/2016    HX KNEE ARTHROSCOPY  12/17/2009/ AND 2008    RIGHT KNEE    HX KNEE REPLACEMENT Right 2013 2016    HX ORTHOPAEDIC  9/09 AND  5/24/2011    RIGHT TOTAL KNEE    HX ORTHOPAEDIC  1/31/2011   10/24/2011    REMOVED KNEE REPLACEMENT    HX ORTHOPAEDIC Left 09/05/2017    HAND    HX ROTATOR CUFF REPAIR Right 2008    HX SHOULDER ARTHROSCOPY  2003    RIGHT       Prior Level of Function/Home Situation: Lives alone in 1 story home, previously indep with ADL/IADLs, son and friend will be available to assist pt during day as needed. Expanded or extensive additional review of patient history:     Home Situation  Home Environment: Private residence  One/Two Story Residence: One story  Living Alone: No  Support Systems: Child(rula), Friends \ neighbors  Patient Expects to be Discharged to[de-identified] Private residence  Current DME Used/Available at Home: Cane, straight, Walker, Raised toilet seat, Other (comment) (shoe hornn)  Tub or Shower Type: Shower  []  Right hand dominant   []  Left hand dominant    EXAMINATION OF PERFORMANCE DEFICITS:  Cognitive/Behavioral Status:  Neurologic State: Alert  Orientation Level: Oriented X4  Cognition: Appropriate decision making; Appropriate for age attention/concentration; Appropriate safety awareness        Safety/Judgement: Good awareness of safety precautions; Awareness of environment    Skin: Intact BUE    Edema: None BUE    Hearing:   Auditory  Auditory Impairment: None    Vision/Perceptual:         Acuity: Within Defined Limits    Corrective Lenses: Glasses    Range of Motion:    AROM: Within functional limits- BUE             Strength:  Strength: Within functional limits - BUE                Coordination:  Coordination: Within functional limits  Fine Motor Skills-Upper: Left Intact; Right Intact    Gross Motor Skills-Upper: Left Intact; Right Intact    Tone & Sensation:  Tone: Normal- BUE  Sensation: Intact- BUE                      Balance:  Sitting: Intact  Standing: Intact; With support  Standing - Static: Good  Standing - Dynamic : Good    Functional Mobility and Transfers for ADLs:  Bed Mobility:  Supine to Sit: Modified independent;Stand-by asssistance; Additional time  Sit to Supine:  (pt remained OOB (to bathroom))  Scooting: Modified independent    Transfers:  Sit to Stand: Contact guard assistance; Additional time;Assist x1  Stand to Sit: Contact guard assistance; Additional time;Assist x1 (onto elevated toilet (bathroom))  Bed to Chair: Contact guard assistance; Adaptive equipment; Additional time  Toilet Transfer : Stand-by asssistance    ADL Assessment:  Feeding: Independent    Oral Facial Hygiene/Grooming: Modified Independent    Bathing: Stand-by assistance    Upper Body Dressing: Modified independent    Lower Body Dressing: Stand-by assistance    Toileting: Modified independent    ADL Intervention and task modifications:       Grooming  Grooming Assistance: Modified independent  Washing Hands: Modified independent      Toileting  Toileting Assistance: Modified independent  Bladder Hygiene: Modified independent  Clothing Management: Modified independent    Cognitive Retraining  Safety/Judgement: Good awareness of safety precautions; Awareness of environment       Dressing joint: Patient instructed and indicated understanding to don/doff LLE first/last.  Patient instructed to don all clothing while sitting prior to standing, doff all clothing to knees while standing, then sit to doff clothing off from knees to feet in order to facilitate fall prevention, pain management, and energy conservation. Home safety: Patient instructed and indicated understanding on home modifications and safety (raise height of ADL objects, appropriate height of chair surfaces, recliner safety, change of floor surfaces, clear pathways) to increase independence and fall prevention. Standing: Patient instructed and indicated understanding to walk up to sink/counter top/surfaces, step into walker to increase safety of joint and fall prevention. Patient educated about knee anatomy verbally and with pictures and educated to avoid rotation of L LE. Instructed to apply concept to ADLs within the home (no twisting of knee during reaching across body, square off while using objects, slide objects along surfaces). Patient instructed and indicated understanding to increase amount of time standing, observe standing position during ADLs in order to increase even weight bearing through bilateral LEs in order to increase independence with ADLs. Functional Measure:  Barthel Index:    Bathin  Bladder: 10  Bowels: 10  Groomin  Dressin  Feeding: 10  Mobility: 10  Stairs: 0  Toilet Use: 10  Transfer (Bed to Chair and Back): 10  Total: 70       Barthel and G-code impairment scale:  Percentage of impairment CH  0% CI  1-19% CJ  20-39% CK  40-59% CL  60-79% CM  80-99% CN  100%   Barthel Score 0-100 100 99-80 79-60 59-40 20-39 1-19   0   Barthel Score 0-20 20 17-19 13-16 9-12 5-8 1-4 0      The Barthel ADL Index: Guidelines  1. The index should be used as a record of what a patient does, not as a record of what a patient could do. 2. The main aim is to establish degree of independence from any help, physical or verbal, however minor and for whatever reason. 3. The need for supervision renders the patient not independent. 4. A patient's performance should be established using the best available evidence.  Asking the patient, friends/relatives and nurses are the usual sources, but direct observation and common sense are also important. However direct testing is not needed. 5. Usually the patient's performance over the preceding 24-48 hours is important, but occasionally longer periods will be relevant. 6. Middle categories imply that the patient supplies over 50 per cent of the effort. 7. Use of aids to be independent is allowed. Paige Bosch., Barthel, D.W. ([id number]). Functional evaluation: the Barthel Index. [address] (14)2. Sania Ban eli [place], J.J.M.F, Ibrahima Dhillon, Ofe Concepcion, Amrita, [address] [facility] (1999). Measuring the change indisability after inpatient rehabilitation; comparison of the responsiveness of the Barthel Index and Functional [place] Measure. Journal of Neurology, Neurosurgery, and Psychiatry, 66(4), 546-598. Chirag Beal, N.J.A, SHELDON Padilla, & Niya Lan MFARNAZ. (2004.) Assessment of post-stroke quality of life in cost-effectiveness studies: The usefulness of the Barthel Index and the EuroQoL-5D. Quality of Life Research, 13, 390-09       G codes: In compliance with CMSs Claims Based Outcome Reporting, the following G-code set was chosen for this patient based on their primary functional limitation being treated: The outcome measure chosen to determine the severity of the functional limitation was the Barthel Index with a score of 70/100 which was correlated with the impairment scale. ?  Self Care:     - CURRENT STATUS: CJ - 20%-39% impaired, limited or restricted    - GOAL STATUS: CJ - 20%-39% impaired, limited or restricted    - D/C STATUS:  CJ - 20%-39% impaired, limited or restricted       Occupational Therapy Evaluation Charge Determination   History Examination Decision-Making   MEDIUM Complexity : Expanded review of history including physical, cognitive and psychosocial  history  LOW Complexity : 1-3 performance deficits relating to physical, cognitive , or psychosocial skils that result in activity limitations and / or participation restrictions  LOW Complexity : No comorbidities that affect functional and no verbal or physical assistance needed to complete eval tasks       Based on the above components, the patient evaluation is determined to be of the following complexity level: LOW   Pain:Pain Scale 1: Numeric (0 - 10)  Pain Intensity 1: 2  Pain Location 1: Knee  Pain Orientation 1: Left  Pain Description 1: Aching  Pain Intervention(s) 1: Medication (see MAR)  Activity Tolerance:   Good tolerance for OOB mobility to/from bathroom. No c/o or s/o SOB or fatigue. After treatment:   []  Patient left in no apparent distress sitting up in chair  [x]  Patient left in no apparent distress in bed  [x]  Call bell left within reach  [x]  Nursing notified  []  Caregiver present  []  Bed alarm activated    COMMUNICATION/EDUCATION:   Communication/Collaboration:  [x]      Home safety education was provided and the patient/caregiver indicated understanding. [x]      Patient/family have participated as able and agree with findings and recommendations. []      Patient is unable to participate in plan of care at this time. Findings and recommendations were discussed with: Physical Therapy Assistant, Registered Nurse and patient    VONNIE Boyd  Time Calculation: 13 mins     Regarding student involvement in patient care:  A student participated in this treatment session. Per CMS Medicare statements and AOTA guidelines I certify that the following was true:  1. I was present and directly observed the entire session. 2. I made all skilled judgments and clinical decisions regarding care. 3. I am the practitioner responsible for assessment, treatment, and documentation.

## 2017-11-01 NOTE — PROGRESS NOTES
Bedside shift change report given to Shannon Sarmiento (oncoming nurse) by Christiano Mcfarlane RN (offgoing nurse). Report given with SBAR, Kardex, Intake/Output and MAR.

## 2017-11-01 NOTE — PROGRESS NOTES
Problem: Mobility Impaired (Adult and Pediatric)  Goal: *Acute Goals and Plan of Care (Insert Text)  Physical Therapy Goals  Initiated 10/31/2017    1. Patient will move from supine to sit and sit to supine  in bed with modified independence within 4 days. 2. Patient will perform sit to stand with modified independence within 4 days. 3. Patient will ambulate with modified independence for 200 feet with the least restrictive device within 4 days. 4. Patient will ascend/descend 5 stairs with 2 handrail(s) with modified independence within 4 days. 5. Patient will perform home exercise program per protocol with modified independence within 4 days. 6. Patient will demonstrate AROM 0-90 degrees in operative joint within 4 days. physical Therapy TREATMENT  Patient: Sarah Mendez (26 y.o. female)  Date: 11/1/2017  Diagnosis: Arthritis of knee, left [M17.12] Osteoarthritis of left knee  Procedure(s) (LRB):  LEFT TOTAL KNEE ARTHROPLASTY (Left) 2 Days Post-Op  Precautions: Fall, WBAT  Chart, physical therapy assessment, plan of care and goals were reviewed. ASSESSMENT:  Pt seen for afternoon PT. Pt still w/limited knee flexion during gait and exercise. Reported pain as 6/10 while sitting EOB (received pain meds ~1300hrs, prior to PT); mentioned she never had this pain in RLE w/previous surgeries, and that pain in left leg was enough to make her cry (reported she had). Pt demonstrated bed mobility w/ SBA w/ increased time; pt used gait belt to assist LLE onto Right as pt using RLE for support of LLE to EOB and w/ descent to floor. Although pt reported pain increasing to 7/10, pt able to amb around unit (~300FT) w/ SBA and RW; pt able to briefly stand to rest w/ increased UE support on RW and through RLE as OT was introduced and to work w/ pt after PT. Noted mild circumduction of LLE during follow through of swing phase d/t decreased flexion. Encouraged proper follow through w/ heel/toe demonstration and verbal cues. Flexion during swing phase w/minimal improvement; also noted minor knee buckling x2 (LLE) (no LOB resulted, pt able to recover/correct w/o physical assist). Pt returned to bathroom at sessions end to void. Instructed pt to use red cord to call out for assist if OT not present by time she is complete. Will plan for stair training and additional exercises to promote knee flexion next session. Continue to recommend HHPT upon d/c to increase strength/ROM. Progression toward goals:  [x]      Improving appropriately and progressing toward goals  []      Improving slowly and progressing toward goals  []      Not making progress toward goals and plan of care will be adjusted     PLAN:  Patient continues to benefit from skilled intervention to address the above impairments. Continue treatment per established plan of care. Discharge Recommendations:  Home Health  Further Equipment Recommendations for Discharge:  None (owns RW)     SUBJECTIVE:   \"I've had 10 surgeries on this (right) knee. I never had pain like I'm having now with this (left knee). \"    OBJECTIVE DATA SUMMARY:   Range of Motion:                          Functional Mobility Training:  Bed Mobility:     Supine to Sit: Modified independent;Stand-by asssistance; Additional time  Sit to Supine:  (pt remained OOB (to bathroom))  Scooting: Modified independent         Transfers:  Sit to Stand: Contact guard assistance; Additional time;Assist x1  Stand to Sit: Contact guard assistance; Additional time;Assist x1 (onto elevated toilet (bathroom))        Bed to Chair: Contact guard assistance; Adaptive equipment; Additional time                    Ambulation/Gait Training:  Distance (ft): 300 Feet (ft)  Assistive Device: Gait belt;Walker, rolling  Ambulation - Level of Assistance: Stand-by asssistance        Gait Abnormalities: Circumduction;Decreased step clearance (mild antalgic gait;slight circumduction during L swing phase)        Base of Support: Widened  Stance: Left decreased (knee flexion continues to be noted in stance phase)  Speed/Henny: Pace decreased (<100 feet/min); Slow  Step Length: Left shortened;Right shortened  Swing Pattern: Left asymmetrical;Right asymmetrical     Interventions: Verbal cues; Visual/Demos (for proper follow through during L swing phase)          Stairs: will plan for tomorrow AM in preparation for d/c. Therapeutic Exercises:   Exercises performed per protocol. See morning treatment note for description. Pain:  Pain Scale 1: Numeric (0 - 10)  Pain Intensity 1: 2  Pain Location 1: Knee  Pain Orientation 1: Left  Pain Description 1: Aching  Pain Intervention(s) 1: Medication (see MAR)  Activity Tolerance:   Good  Please refer to the flowsheet for vital signs taken during this treatment.   After treatment:   [x] Patient left in no apparent distress sitting up on bathroom commode   [] Patient left in no apparent distress in bed  [x] Call bell left within reach  [x] Nursing notified  [] Caregiver present  [] Bed alarm activated    COMMUNICATION/COLLABORATION:   The patients plan of care was discussed with: Registered Nurse Parvez Gaytan PTA   Time Calculation: 20 mins

## 2017-11-01 NOTE — PROGRESS NOTES
Pharmacist Note  Warfarin Dosing  Consult provided for this 78 y.o. female to manage warfarin for VTE PPx s/p LTK  - H/o TIA in 2006  - Not on anticoagulation at home    INR Goal: 1.7- 2.2  Therapy Day: 3    Drugs that may increase INR: None  Drugs that may decrease INR: None  Other current anticoagulants/ drugs that may increase bleeding risk: NSAID  Risk factors: Age > 65  Daily INR ordered: YES  Recent Labs      11/01/17   0257  10/31/17   1005  10/31/17   0938   HGB  9.3*  9.8*   --    INR  1.6*   --   1.2*       Date               INR                 Dose  10/16  1.0  ---  10/29  ---  None PreOp  10/30  ---  5 mg  10/31  1.2  5 mg   11/01  1.6  3 mg    Assessment/ Plan: Will order warfarin 3 mg PO x 1 dose. Pharmacy will continue to monitor daily and adjust therapy as indicated.

## 2017-11-01 NOTE — PROGRESS NOTES
Bedside shift change report given to [employer] (oncoming nurse) by Ally Duckworth (offgoing nurse). Report included the following information SBAR, Kardex, Intake/Output, MAR and Recent Results.

## 2017-11-01 NOTE — PROGRESS NOTES
Bedside and Verbal shift change report given to Vanessa RN (oncoming nurse) by [facility], RN (offgoing nurse). Report included the following information SBAR, Kardex, Intake/Output, MAR and Recent Results.

## 2017-11-01 NOTE — PROGRESS NOTES
Care Management Interventions  PCP Verified by CM: Yes (Dr. Siddhartha Honeycutt)  Palliative Care Criteria Met (RRAT>21 & CHF Dx)?: No  Mode of Transport at Discharge: Self  Transition of Care Consult (CM Consult): [address]: No  Reason Outside [place]: Out of service area (referral sent to [facility])  MyChart Signup: No  Discharge Durable Medical Equipment: No  Physical Therapy Consult: Yes  Occupational Therapy Consult: Yes  Speech Therapy Consult: No  Current Support Network: Own Home  Plan discussed with Pt/Family/Caregiver: Yes  Freedom of Choice Offered: Yes  Discharge Location  Discharge Placement: Home with home health    Home care orders noted. CRM met with the patient regarding agency choice. The patient chose [facility]. CRM sent referral via [employer] and will follow. The patient may be discharged tomorrow. The patient is a medicare bundle.  LYN

## 2017-11-01 NOTE — PROGRESS NOTES
NP ORTHO PROGRESS NOTE    Admit date: 10/30/2017  Date of Surgery: 10/30/2017   Procedures: Procedure(s):  LEFT TOTAL KNEE ARTHROPLASTY  Admitting Physician: Grace Pacheco MD   Surgeon: Emerita Mccabe) and Role:     * Grace Pacheco MD - Primary    Chart/Meds/Labs Reviewed  Current Facility-Administered Medications   Medication Dose Route Frequency    [START ON 11/2/2017] potassium chloride SR (KLOR-CON 10) tablet 20 mEq  20 mEq Oral DAILY    magnesium hydroxide (MILK OF MAGNESIA) 400 mg/5 mL oral suspension 30 mL  30 mL Oral ONCE    warfarin (COUMADIN) tablet 3 mg  3 mg Oral ONCE    glucose chewable tablet 16 g  4 Tab Oral PRN    dextrose (D50W) injection syrg 12.5-25 g  12.5-25 g IntraVENous PRN    glucagon (GLUCAGEN) injection 1 mg  1 mg IntraMUSCular PRN    acetaminophen (TYLENOL) tablet 1,000 mg  1,000 mg Oral Q6H    celecoxib (CELEBREX) capsule 200 mg  200 mg Oral Q12H    amLODIPine (NORVASC) tablet 5 mg  5 mg Oral DAILY    levETIRAcetam (KEPPRA) tablet 1,000 mg  1,000 mg Oral QHS    pantoprazole (PROTONIX) tablet 40 mg  40 mg Oral ACB    sodium chloride (NS) flush 5-10 mL  5-10 mL IntraVENous Q8H    sodium chloride (NS) flush 5-10 mL  5-10 mL IntraVENous PRN    naloxone (NARCAN) injection 0.4 mg  0.4 mg IntraVENous PRN    senna-docusate (PERICOLACE) 8.6-50 mg per tablet 1 Tab  1 Tab Oral BID    polyethylene glycol (MIRALAX) packet 17 g  17 g Oral DAILY    bisacodyl (DULCOLAX) suppository 10 mg  10 mg Rectal DAILY PRN    oxyCODONE IR (ROXICODONE) tablet 5 mg  5 mg Oral Q3H PRN    oxyCODONE IR (ROXICODONE) tablet 10 mg  10 mg Oral Q3H PRN    Warfarin - Pharmacy to Dose   Other Rx Dosing/Monitoring       Subjective:    Complaints: Incisional pain. Poor flexion & extension with PT but making progress. Denies Dizziness, CP, SOB, N/V, Abdominal pain, numbness or tingling of extremities. Able to perform ankle pumps easily.   Progressing with mobility steadily  Incisional pain control: as expected  2-10/10  Pain Control:   Pain Assessment  Pain Scale 1: Numeric (0 - 10)  Pain Intensity 1: 2  Pain Onset 1: Post op  Pain Location 1: Knee  Pain Orientation 1: Left  Pain Description 1: Aching  Pain Intervention(s) 1: Medication (see MAR)    Oral diet: Tolerating diet well. But has not had BM yet. Objective:  General: Alert,Ox4, cooperative, NAD  HEENT: Atraumatic, PERRL, anicteric sclerae  Lungs: Bilateral expansion. Equal excursion. No accessory muscle use. Gastrointestinal:  Soft, non-tender, non-distended  Extremities:  Neurovasc exam WDL. + DP pulses. Sensation intact to light touch. Motor: + DF/PF          Calves non-tender upon palpation or with passive stretch. No significant erythema or swelling. Dressing: clean, dry and intact.   Only small spots of sanguinous contained in Aquacel (expected)    Vital Signs:   Visit Vitals    /65    Pulse 81    Temp 97.9 °F (36.6 °C)    Resp 15    Ht 5' 5.5\" (1.664 m)    Wt 81.6 kg (179 lb 14.3 oz)    SpO2 96%    BMI 29.48 kg/m2    O2 Flow Rate (L/min): 2 l/min O2 Device: Room air   Patient Vitals for the past 24 hrs:   BP Temp Pulse Resp SpO2   17 0811 113/65 97.9 °F (36.6 °C) 81 15 96 %   17 0243 105/61 98.3 °F (36.8 °C) 79 15 94 %   10/31/17 2017 118/61 98.3 °F (36.8 °C) 78 15 94 %   10/31/17 1407 126/66 98.3 °F (36.8 °C) 92 16 98 %     Temp (24hrs), Av.2 °F (36.8 °C), Min:97.9 °F (36.6 °C), Max:98.3 °F (36.8 °C)        LAB:   Recent Results (from the past 24 hour(s))   GLUCOSE, POC    Collection Time: 10/31/17  1:46 PM   Result Value Ref Range    Glucose (POC) 174 (H) 65 - 100 mg/dL    Performed by Johanna 37, POC    Collection Time: 10/31/17  4:49 PM   Result Value Ref Range    Glucose (POC) 117 (H) 65 - 100 mg/dL    Performed by JOIE MILLER    GLUCOSE, POC    Collection Time: 10/31/17  9:22 PM   Result Value Ref Range    Glucose (POC) 131 (H) 65 - 100 mg/dL    Performed by JOIE MILLER    HEMOGLOBIN    Collection Time: 11/01/17  2:57 AM   Result Value Ref Range    HGB 9.3 (L) 11.5 - 16.0 g/dL   PROTHROMBIN TIME + INR    Collection Time: 11/01/17  2:57 AM   Result Value Ref Range    INR 1.6 (H) 0.9 - 1.1      Prothrombin time 16.1 (H) 9.0 - 46.1 sec   METABOLIC PANEL, BASIC    Collection Time: 11/01/17  2:57 AM   Result Value Ref Range    Sodium 139 136 - 145 mmol/L    Potassium 3.7 3.5 - 5.1 mmol/L    Chloride 106 97 - 108 mmol/L    CO2 27 21 - 32 mmol/L    Anion gap 6 5 - 15 mmol/L    Glucose 99 65 - 100 mg/dL    BUN 14 6 - 20 MG/DL    Creatinine 1.02 0.55 - 1.02 MG/DL    BUN/Creatinine ratio 14 12 - 20      GFR est AA >60 >60 ml/min/1.73m2    GFR est non-AA 54 (L) >60 ml/min/1.73m2    Calcium 7.9 (L) 8.5 - 10.1 MG/DL   GLUCOSE, POC    Collection Time: 11/01/17  6:30 AM   Result Value Ref Range    Glucose (POC) 106 (H) 65 - 100 mg/dL    Performed by Samantha Patrick    GLUCOSE, POC    Collection Time: 11/01/17 11:26 AM   Result Value Ref Range    Glucose (POC) 123 (H) 65 - 100 mg/dL    Performed by [facility]       Lab Results   Component Value Date/Time    INR 1.6 11/01/2017 02:57 AM    INR 1.0 10/16/2017 10:17 AM    INR 1.4 12/11/2016 02:06 AM    INR 1.1 12/10/2016 02:44 AM    INR (POC) 1.2 10/31/2017 09:38 AM     Lab Results   Component Value Date/Time    HGB 9.3 11/01/2017 02:57 AM    HGB 9.8 10/31/2017 10:05 AM    HGB 11.5 10/16/2017 10:17 AM    HGB 9.9 12/11/2016 02:06 AM     Recent Labs      11/01/17   0257  10/31/17   1005   NA  139  132*   K  3.7  3.0*   CL  106  103   BUN  14  13   CREA  1.02  1.01   GLU  99  125*   CA  7.9*  7.8*       PT/OT:   Gait:  Gait  Base of Support: Widened  Speed/Henny: Pace decreased (<100 feet/min)  Step Length: Left shortened, Right shortened  Swing Pattern: Left asymmetrical, Right asymmetrical  Stance: Left decreased  Gait Abnormalities: Antalgic, Decreased step clearance, Trunk sway increased (knee flexion in stance bilaterally, minimal flex in swing)  Ambulation - Level of Assistance: Stand-by asssistance, Adaptive equipment, Additional time  Distance (ft): 300 Feet (ft)  Assistive Device: Gait belt, Walker, rolling  Interventions: Safety awareness training, Tactile cues, Verbal cues, Visual/Demos            Interventions: Safety awareness training, Tactile cues, Verbal cues, Visual/Demos    PATIENT MOBILITY  Bed Mobility Training  Supine to Sit: Modified independent, Adaptive equipment, Additional time (using strap)  Sit to Supine: Stand-by asssistance, Adaptive equipment, Additional time (using strap, increased time but did not need physical assist)  Scooting: Modified independent  Transfer Training  Sit to Stand: Contact guard assistance, Adaptive equipment, Additional time (using a slow, step back approach)  Stand to Sit: Contact guard assistance, Adaptive equipment, Additional time (moving very slowly due to limited knee flexion bilat)  Bed to Chair: Contact guard assistance, Adaptive equipment, Additional time      Gait Training  Assistive Device: Gait belt, Walker, rolling  Ambulation - Level of Assistance: Stand-by asssistance, Adaptive equipment, Additional time  Distance (ft): 300 Feet (ft)  Interventions: Safety awareness training, Tactile cues, Verbal cues, Visual/Demos   Weight Bearing Status  Right Side Weight Bearing: Full  Left Side Weight Bearing: As tolerated        Assessment and Plan    Principal Problem:    Osteoarthritis of left knee (10/30/2017)          POD#2 Procedure(s):  LEFT TOTAL KNEE ARTHROPLASTY  Expected acute blood loss anemia related to surgery. No indications of bleeding. Stable. Labs trending as expected. VTE prophylaxis: see above for medication, Mobilization, Ankle pumping exercises, SCDs per order if not able to exercise or mobilize well. Weight bearing:  WBAT  Pain management:   Difficult for this patient. Needs lots of encouragement.   Multi-modal pain plan, see above for medication,  Ice packs & elevation of extremity per orders, active gentle ROM & mobilize frequently for short periods of time. Opioids adding to Constipation. Bowel regimen  reviewed with patient. PT slow progress OT   Wound benign. Neurovascularly intact. Progressing very slowly Continue present plans per Dr. Amaya Best. Patient seen this morning by Dr. Amaya Best    Discharge Planning: Home with [facility] tomorrow.     Signed By: Mahin Carreno NP    RN, MSN, MA, Adult NP-BC

## 2017-11-02 VITALS
HEART RATE: 74 BPM | OXYGEN SATURATION: 96 % | HEIGHT: 66 IN | TEMPERATURE: 97.6 F | DIASTOLIC BLOOD PRESSURE: 60 MMHG | WEIGHT: 179.9 LBS | RESPIRATION RATE: 18 BRPM | BODY MASS INDEX: 28.91 KG/M2 | SYSTOLIC BLOOD PRESSURE: 97 MMHG

## 2017-11-02 LAB
GLUCOSE BLD STRIP.AUTO-MCNC: 107 MG/DL (ref 65–100)
GLUCOSE BLD STRIP.AUTO-MCNC: 81 MG/DL (ref 65–100)
INR BLD: 1.6 (ref 0.9–1.2)
SERVICE CMNT-IMP: ABNORMAL
SERVICE CMNT-IMP: NORMAL

## 2017-11-02 PROCEDURE — 97116 GAIT TRAINING THERAPY: CPT | Performed by: PHYSICAL THERAPIST

## 2017-11-02 PROCEDURE — 74011250637 HC RX REV CODE- 250/637: Performed by: ORTHOPAEDIC SURGERY

## 2017-11-02 PROCEDURE — 85610 PROTHROMBIN TIME: CPT

## 2017-11-02 PROCEDURE — 82962 GLUCOSE BLOOD TEST: CPT

## 2017-11-02 PROCEDURE — 74011250637 HC RX REV CODE- 250/637: Performed by: NURSE PRACTITIONER

## 2017-11-02 RX ORDER — ACETAMINOPHEN 500 MG
1000 TABLET ORAL EVERY 6 HOURS
Status: DISCONTINUED | OUTPATIENT
Start: 2017-11-02 | End: 2017-11-02 | Stop reason: HOSPADM

## 2017-11-02 RX ORDER — OXYCODONE HYDROCHLORIDE 5 MG/1
2.5-5 TABLET ORAL
Qty: 70 TAB | Refills: 0 | Status: SHIPPED | OUTPATIENT
Start: 2017-11-02 | End: 2019-03-18

## 2017-11-02 RX ORDER — OXYCODONE HYDROCHLORIDE 5 MG/1
5 TABLET ORAL
Status: DISCONTINUED | OUTPATIENT
Start: 2017-11-02 | End: 2017-11-02 | Stop reason: HOSPADM

## 2017-11-02 RX ORDER — WARFARIN SODIUM 5 MG/1
5 TABLET ORAL ONCE
Status: COMPLETED | OUTPATIENT
Start: 2017-11-02 | End: 2017-11-02

## 2017-11-02 RX ORDER — WARFARIN 2 MG/1
TABLET ORAL
Qty: 90 TAB | Refills: 0 | Status: SHIPPED | OUTPATIENT
Start: 2017-11-03 | End: 2019-03-22

## 2017-11-02 RX ADMIN — POTASSIUM CHLORIDE 20 MEQ: 750 TABLET, FILM COATED, EXTENDED RELEASE ORAL at 08:53

## 2017-11-02 RX ADMIN — OXYCODONE HYDROCHLORIDE 10 MG: 5 TABLET ORAL at 06:37

## 2017-11-02 RX ADMIN — WARFARIN SODIUM 5 MG: 5 TABLET ORAL at 12:01

## 2017-11-02 RX ADMIN — ACETAMINOPHEN 1000 MG: 500 TABLET, FILM COATED ORAL at 12:49

## 2017-11-02 RX ADMIN — PANTOPRAZOLE SODIUM 40 MG: 40 TABLET, DELAYED RELEASE ORAL at 06:37

## 2017-11-02 RX ADMIN — STANDARDIZED SENNA CONCENTRATE AND DOCUSATE SODIUM 1 TABLET: 8.6; 5 TABLET, FILM COATED ORAL at 08:54

## 2017-11-02 RX ADMIN — CELECOXIB 200 MG: 200 CAPSULE ORAL at 08:51

## 2017-11-02 RX ADMIN — OXYCODONE HYDROCHLORIDE 10 MG: 5 TABLET ORAL at 02:23

## 2017-11-02 RX ADMIN — ACETAMINOPHEN 1000 MG: 500 TABLET, FILM COATED ORAL at 02:23

## 2017-11-02 NOTE — DISCHARGE INSTRUCTIONS
After Hospital Care Plan:  Discharge Instructions Knee Replacement  Dr. Abdulaziz Mohamud    Patient Name: Claudeen Savers  Date of procedure: 10/30/2017   Procedure: Procedure(s):  LEFT TOTAL KNEE ARTHROPLASTY  Surgeon: Pavan Matta) and Role:     * Kristina Quintana MD - Primary  PCP: Saida Connor MD  Date of discharge: No discharge date for patient encounter. Follow up appointments   Follow up with Dr. Abdulaziz Mohamud in 3 weeks. Call [phone] to make an appointment.  If home health has been arranged for you the agency will contact you to arrange dates/times for visits. Please call them if you do not hear from them within 24 hours after you are discharged    When to call your Orthopaedic Surgeon: Call [phone]. If you call after 5pm or on a weekend, the on call physician will be contacted   Unrelieved pain   Signs of infection-if your incision is red; continues to have drainage; drainage has a foul odor or if you have a persistent fever over 101 degrees   Signs of a blood clot in your leg-calf pain, tenderness, redness, swelling of lower leg    When to call your Primary Care Physician:   Concerns about medical conditions such as diabetes, high blood pressure, asthma, congestive heart failure   Call if blood sugars are elevated, persistent headache or dizziness, coughing or congestion, constipation or diarrhea, burning with urination, abnormal heart rate    When to call [phone] go to the nearest emergency room   Acute onset of chest pain, shortness of breath, difficulty breathing    Activity   Weight bearing as tolerated with walker or crutches.  Refer to pages 23-31 of your handbook for instructions and pictures   Complete your Home Exercise Program daily as instructed by your therapist.  Refer to pages 33-41 of your handbook for instructions and pictures   Get up every one hour and walk (except at night when sleeping)   Do not drive or operate heavy machinery    Incision Care   [facility] (brown, waterproof) surgical dressing is to remain on your knee for 7 days. On the 7th day have someone gently peel the dressing off by carefully lifting the edge and stretching it slightly to break the adhesive seal and leave incision open to air. You may take a shower with the Aquacel dressing in place.  If You have staples in your knee incision; they will be removed by the [facility] staff in 10-14 days and steri-strips will be applied. Leave the steri-strips on until they fall off      Preventing blood clots    Take Coumadin as prescribed  by Dr. Kasey Marquez for one month following surgery    Pain management   Take pain medication as prescribed; decrease the amount you use as your pain lessens   Avoid alcoholic beverages while taking pain medication   Do not take any over-the-counter medication for pain except Tylenol (acetaminophen)   Please be aware that many medications contain Tylenol. We do not want you to over medicate so please read the information below as a guide. Do not take more than 4 Grams of Tylenol in a 24 hour period. (There are 1000 milligrams in one Gram)  o 325 mg of Tylenol per tablet (do not take more than 12 tablets in 24 hours)  o 500 mg of Tylenol per tablet (do not take more than 8 tablets in 24 hours)  o 650 mg of Tylenol per tablet (do not take more than 5 tablets in 24 hours).  Elevate your leg (do not bend/flex knee) and place ice bags on your knee for 15-20 minutes after exercising and as needed throughout the day and night    Diet   Resume usual diet; drink plenty of fluids; eat foods high in fiber   You may want to take a stool softener (such as Senokot-S or Colace) to prevent constipation while you are taking pain medication.   If constipation occurs, take a laxative (such as Dulcolax tablets, Milk of Magnesia, or a suppository)    [facility] (to be followed by Wing Wilson didier)    Nursing-per physicians order  Michela Valdez 20 a PT/INR per physicians order and call results to Dr. Minesh Mishra at [phone], extension [phone]    If present please Remove staples 10-14 days post op and apply steri-strips   Remove Aquacel dressing at 7 days post-op    Complete head to toe assessment, vital signs   Medication reconciliation   Review pain management   Manage chronic medical conditions    Physical Therapy-per physicians order    Weight bearing status:  Precautions at Admission: Fall, WBAT  Left Side Weight Bearing: As tolerated  Right Side Weight Bearing: Full    Mobility Status:  Supine to Sit: Modified independent, Stand-by asssistance, Additional time  Sit to Stand: Contact guard assistance, Additional time, Assist x1  Sit to Supine:  (pt remained OOB (to bathroom))  Bed to Chair: Contact guard assistance, Adaptive equipment, Additional time    Gait:  Distance (ft): 300 Feet (ft)  Ambulation - Level of Assistance: Stand-by asssistance  Assistive Device: Gait belt, Walker, rolling  Gait Abnormalities: Circumduction, Decreased step clearance (mild antalgic gait;slight circumduction during L swing phase)    ADL status overall composite: Toilet Transfer : Stand-by asssistance       Physical Therapy   Assessment and evaluation-bed mobility; functional transfers (bed, chair, bathroom, stairs); ambulation with equipment, car transfers, shower transfers, safety and ability to get out of house in the event of an emergency   Review and maintain weight bearing as tolerated   Discuss pain management   Review how to do ADLs.  Refer to page 42 of patient handbook    Home Exercise Program-refer to pages 33-41 of the patient handbook for exercises  Patient meets criteria for   BUNDLED PAYMENT   for Care Improvement Initiative Criteria    Contact Information for Orthopedic Nurse Navigator:      EMILEE Quispe, RN-BC  [phone]

## 2017-11-02 NOTE — PROGRESS NOTES
Bedside shift change report given to Rukhsana [facility] (oncoming nurse) by Laine Meng (offgoing nurse). Report included the following information SBAR, Kardex, Intake/Output, MAR and Recent Results.

## 2017-11-02 NOTE — PROGRESS NOTES
NP ORTHO PROGRESS NOTE    Admit date: 10/30/2017  Date of Surgery: 10/30/2017   Procedures: Procedure(s):  LEFT TOTAL KNEE ARTHROPLASTY  Admitting Physician: Eulalio Elizabeth MD   Surgeon: Alina Marinelli) and Role:     * Eulalio Elizabeth MD - Primary    Chart/Meds/Labs Reviewed      Subjective:    Complaints: Really drowsy from the pain \"oxycodone\"  Medication--haven't had breakfast yet & had not food with pill. Denies Dizziness, CP, SOB, N/V, Abdominal pain, numbness or tingling of extremities. Able to perform ankle pumps easily. Progressing with mobility. Incisional pain control: As noted. Well controlled. Pain Control:   Pain Assessment  Pain Scale 1: Numeric (0 - 10)  Pain Intensity 1: 0  Pain Onset 1: Post op  Pain Location 1: Knee  Pain Orientation 1: Left  Pain Description 1: Aching  Pain Intervention(s) 1: Medication (see MAR)    Oral diet: Tolerating diet well. Has had BM    Objective:  General: Alert,Ox4, cooperative, NAD  HEENT: Atraumatic, PERRL, anicteric sclerae  Lungs: Bilateral expansion. Equal excursion. No accessory muscle use. Gastrointestinal:  Soft, non-tender, non-distended  Extremities:  Neurovasc exam WDL. + DP pulses. Sensation intact to light touch. Motor: + DF/PF          Calves non-tender upon palpation or with passive stretch. No significant erythema or swelling. Dressing: clean, dry and intact.     Vital Signs:   Visit Vitals    BP 97/60 (BP 1 Location: Right arm, BP Patient Position: At rest;Supine)    Pulse 74    Temp 97.6 °F (36.4 °C)    Resp 18    Ht 5' 5.5\" (1.664 m)    Wt 81.6 kg (179 lb 14.3 oz)    SpO2 96%    BMI 29.48 kg/m2    O2 Flow Rate (L/min): 2 l/min O2 Device: Room air   Patient Vitals for the past 24 hrs:   BP Temp Pulse Resp SpO2   11/02/17 0821 97/60 97.6 °F (36.4 °C) 74 18 96 %   11/02/17 0224 126/65 98.1 °F (36.7 °C) 92 16 98 %   11/01/17 2015 110/57 98 °F (36.7 °C) 75 16 99 %   11/01/17 1420 123/64 98.1 °F (36.7 °C) 82 16 100 %     Temp (24hrs), Av °F (36.7 °C), Min:97.6 °F (36.4 °C), Max:98.1 °F (36.7 °C)      LAB:   Recent Results (from the past 24 hour(s))   GLUCOSE, POC    Collection Time: 17 11:26 AM   Result Value Ref Range    Glucose (POC) 123 (H) 65 - 100 mg/dL    Performed by Johanna Huynh, POC    Collection Time: 17  4:47 PM   Result Value Ref Range    Glucose (POC) 91 65 - 100 mg/dL    Performed by Janak Hankins    GLUCOSE, POC    Collection Time: 17  9:31 PM   Result Value Ref Range    Glucose (POC) 109 (H) 65 - 100 mg/dL    Performed by Alexa, POC    Collection Time: 17  7:10 AM   Result Value Ref Range    Glucose (POC) 107 (H) 65 - 100 mg/dL    Performed by Monica Arreola    POC INR    Collection Time: 17  8:18 AM   Result Value Ref Range    INR (POC) 1.6 (H) <1.2        Lab Results   Component Value Date/Time    INR 1.6 2017 02:57 AM    INR 1.0 10/16/2017 10:17 AM    INR 1.4 2016 02:06 AM    INR 1.1 12/10/2016 02:44 AM    INR (POC) 1.6 2017 08:18 AM    INR (POC) 1.2 10/31/2017 09:38 AM     Lab Results   Component Value Date/Time    HGB 9.3 2017 02:57 AM    HGB 9.8 10/31/2017 10:05 AM    HGB 11.5 10/16/2017 10:17 AM    HGB 9.9 2016 02:06 AM     Recent Labs      17   0257  10/31/17   1005   NA  139  132*   K  3.7  3.0*   CL  106  103   BUN  14  13   CREA  1.02  1.01   GLU  99  125*   CA  7.9*  7.8*       PT/OT:   Gait:  Gait  Base of Support: Widened  Speed/Henny: Pace decreased (<100 feet/min), Slow  Step Length: Left shortened, Right shortened  Swing Pattern: Left asymmetrical, Right asymmetrical  Stance: Left decreased (knee flexion continues to be noted in stance phase)  Gait Abnormalities: Circumduction, Decreased step clearance (mild antalgic gait;slight circumduction during L swing phase)  Ambulation - Level of Assistance: Stand-by asssistance  Distance (ft): 300 Feet (ft)  Assistive Device: Gait belt, Walker, rolling  Interventions: Verbal cues, Visual/Demos (for proper follow through during L swing phase)            Interventions: Verbal cues, Visual/Demos (for proper follow through during L swing phase)    PATIENT MOBILITY  Bed Mobility Training  Supine to Sit: Modified independent, Stand-by asssistance, Additional time  Sit to Supine:  (pt remained OOB (to bathroom))  Scooting: Modified independent  Transfer Training  Sit to Stand: Contact guard assistance, Additional time, Assist x1  Stand to Sit: Contact guard assistance, Additional time, Assist x1 (onto elevated toilet (bathroom))  Bed to Chair: Contact guard assistance, Adaptive equipment, Additional time      Gait Training  Assistive Device: Gait belt, Walker, rolling  Ambulation - Level of Assistance: Stand-by asssistance  Distance (ft): 300 Feet (ft)  Interventions: Verbal cues, Visual/Demos (for proper follow through during L swing phase)   Weight Bearing Status  Right Side Weight Bearing: Full  Left Side Weight Bearing: As tolerated        Assessment and Plan    Principal Problem:    Osteoarthritis of left knee (10/30/2017)          POD#3 Procedure(s):  LEFT TOTAL KNEE ARTHROPLASTY    VTE prophylaxis: warfarin, Mobilization, Ankle pumping exercises. Weight bearing:  WBAT  Pain management:  Well controlled. Back off the oxycodone. Ice packs & elevation of extremity per orders, active gentle ROM & mobilize frequently for short periods of time. PT/OT  Stable postop--Wound benign. Neurovascularly intact. Progressing well. Continue present plans per Dr. Miguel Dias. Discharge Planning: Home today with Home care services.  Plans per Dr. Dhiraj Miller    Signed By: Rick Kenyon NP    RN, MSN, MA, Adult NP-BC

## 2017-11-02 NOTE — PROGRESS NOTES
[address]  AR Bundled Payment Handoff     FROM:                                TO: [facility]                                                      ([address] [place] [address] or Facility name)  [address]  [address] [id number]  Dept: [address]: [phone]                                      Room#:  568/01                                                      Discharging Nurse:  Jeanna Rich RN  Unit QQ#:[phone]         SITUATION      ASAScore: ASA 1 - Normal health patient    Admitted:  10/30/2017  Hospital Day: 4      Attending Provider:  No att. providers found     Consultations:  None    PCP:  Avelino Smith MD   [phone]     Admitting Dx: Arthritis of knee, left [M17.12]       Principal Problem:    Osteoarthritis of left knee (10/30/2017)      3 Days Post-Op of   Procedure(s):  LEFT TOTAL KNEE ARTHROPLASTY   BY: Katarina Rios MD             ON: 10/30/2017                  Code Status: Prior             Advance Directive? No Doesnt Have (Send w/patient)     Isolation:  There are currently no Active Isolations       MDRO: No current active infections    BACKGROUND     Allergies:  No Known Allergies    Past Medical History:   Diagnosis Date    Arthritis     GERD (gastroesophageal reflux disease)     Hypercholesterolemia     Hypertension     Other ill-defined conditions(799.89)     MGUS (MONOCLONAL GAMMOPATHY OF UNCLEAR SIGNIFICANCE)    Prediabetes     Seizures ([facility] [address] 2016    PER NEUROLOGIST ABSENCE SEIZURES, patient states    Stroke ([facility] [address] 2006    TIA    Syncope and collapse     loop recorder placed 4/2016 (syncope of unknown etiology)       Past Surgical History:   Procedure Laterality Date    HX APPENDECTOMY      REMOVED DURING HYSTERECTOMY    HX COLONOSCOPY      HX DILATION AND CURETTAGE      HX HYSTERECTOMY  1996    HX IMPLANTABLE LOOP RECORDER  04/29/2016    HX KNEE ARTHROSCOPY  12/17/2009/ AND 2008    RIGHT KNEE    HX KNEE REPLACEMENT Right 2013 2016    HX ORTHOPAEDIC  9/09 AND  5/24/2011    RIGHT TOTAL KNEE    HX ORTHOPAEDIC  1/31/2011   10/24/2011    REMOVED KNEE REPLACEMENT    HX ORTHOPAEDIC Left 09/05/2017    HAND    HX ROTATOR CUFF REPAIR Right 2008    HX SHOULDER ARTHROSCOPY  2003    RIGHT       Prior to Admission Medications   Prescriptions Last Dose Informant Patient Reported? Taking? AMLODIPINE BESYLATE (NORVASC PO) 10/30/2017 at 0700  Yes Yes   Sig: Take 5 mg by mouth daily. Cholecalciferol, Vitamin D3, (VITAMIN D) 1,000 unit Cap 10/29/2017 at Unknown time  Yes Yes   Sig: Take 2 Tabs by mouth daily (with dinner). atorvastatin (LIPITOR) 20 mg tablet 10/30/2017 at 0700  Yes Yes   Sig: Take 20 mg by mouth daily. diclofenac EC (VOLTAREN) 50 mg EC tablet 10/23/2017 at Unknown time  Yes Yes   Sig: Take 1 Tab by mouth daily as needed. levETIRAcetam (KEPPRA) 500 mg tablet 10/29/2017 at Unknown time  Yes Yes   Sig: Take 1,000 mg by mouth nightly. losartan-hydrochlorothiazide (HYZAAR) 100-25 mg per tablet 10/29/2017 at Unknown time  Yes Yes   Sig: Take 1 Tab by mouth daily. omeprazole (PRILOSEC) 20 mg capsule 10/30/2017 at 0700  Yes Yes   Sig: Take 20 mg by mouth daily. potassium chloride SR (KLOR-CON 10) 10 mEq tablet 10/29/2017 at Unknown time  Yes Yes   Sig: Take 10-20 mEq by mouth daily. Facility-Administered Medications: None       Vaccinations:    Immunization History   Administered Date(s) Administered    Influenza Vaccine PF 02/06/2013    Influenza Vaccine Whole 01/01/2012    Pneumococcal Polysaccharide (PPSV-23) 02/06/2013         ASSESSMENT   Age: 78 y.o.              Gender: female        Height: Height: 5' 5.5\" (166.4 cm)                    Weight:Weight: 81.6 kg (179 lb 14.3 oz)     Patient Vitals for the past 8 hrs:   Temp Pulse Resp BP SpO2   11/02/17 0821 97.6 °F (36.4 °C) 74 18 97/60 96 %            Active Orders   Diet    DIET DIABETIC CONSISTENT CARB Regular Orientation: Orientation Level: Oriented X4    Active Lines/Drains:  (Peg Tube / Jones / CL or S/L?):no    Urinary Status: Voiding      Last BM: Last Bowel Movement Date: 10/31/17     Skin Integrity: Incision (comment) (Left knee)   Wound Knee Left-DRESSING STATUS: Clean, dry, and intact  Wound Knee Left-DRESSING STATUS: Clean, dry, and intact    Wound Knee Left-DRESSING TYPE: Silver products  Wound Knee Left-DRESSING TYPE: Cast padding, Elastic bandage, Staples, Other (Comment) (aquacel Ag)    Mobility: Slightly limited   Weight Bearing Status: WBAT (Weight Bearing as Tolerated)      Gait Training  Assistive Device: Gait belt, Walker, rolling  Ambulation - Level of Assistance: Stand-by asssistance  Distance (ft): 150 Feet (ft)  Stairs - Level of Assistance: Contact guard assistance  Number of Stairs Trained: 8  Rail Use: Right  (initially with SPC, then lateral approach)  Interventions: Verbal cues, Visual/Demos (for proper follow through during L swing phase)     On Anticoagulation? YES  Aspirin 325mg                                      Last dose:  11/2/2017at 0900    Pain Medications given:  Oxycodone 10mg                               Last dose: 11/2/2017 at  0637    Lab Results   Component Value Date/Time    Glucose 99 11/01/2017 02:57 AM    Hemoglobin A1c 6.3 10/16/2017 10:17 AM    INR 1.6 11/01/2017 02:57 AM    INR 1.0 10/16/2017 10:17 AM    INR (POC) 1.6 11/02/2017 08:18 AM    INR (POC) 1.2 10/31/2017 09:38 AM    HGB 9.3 11/01/2017 02:57 AM    HGB 9.8 10/31/2017 10:05 AM    HGB 11.5 10/16/2017 10:17 AM    HGB 9.9 12/11/2016 02:06 AM       Readmission Risks:  Score:         RECOMMENDATION     See After Visit Summary (AVS) for:  · Discharge instructions  · After [address]   · Medication Reconciliation          [facility] Orthopaedic Nurse Navigator  EMILEE Winslow, RN-BC       Office  [phone]  Cell      [phone]  Fax      [phone]  [email or web address]             . Denisha

## 2017-11-02 NOTE — PROGRESS NOTES
Problem: Mobility Impaired (Adult and Pediatric)  Goal: *Acute Goals and Plan of Care (Insert Text)  Physical Therapy Goals  Initiated 10/31/2017    1. Patient will move from supine to sit and sit to supine  in bed with modified independence within 4 days. 2. Patient will perform sit to stand with modified independence within 4 days. 3. Patient will ambulate with modified independence for 200 feet with the least restrictive device within 4 days. 4. Patient will ascend/descend 5 stairs with 2 handrail(s) with modified independence within 4 days. 5. Patient will perform home exercise program per protocol with modified independence within 4 days. 6. Patient will demonstrate AROM 0-90 degrees in operative joint within 4 days. physical Therapy TREATMENT  Patient: Geogrina Alva (47 y.o. female)  Date: 11/2/2017  Diagnosis: Arthritis of knee, left [M17.12] Osteoarthritis of left knee  Procedure(s) (LRB):  LEFT TOTAL KNEE ARTHROPLASTY (Left) 3 Days Post-Op  Precautions: Fall, WBAT    ASSESSMENT:  Patient making steady progress toward goals. Patient currently modified independent with bed mobility and uses gait belt to assist leg OOB. Sit to stand with SBA and amb approx 150 feet with RW and SBA. Able to progress to step through pattern and slow, antalgic gait pattern. Up/down 8 stairs with R rail and SPC initially. When going down the stairs instructed patient in lateral approach to improve stability. Patient is cleared for DC from a mobility standpoint. Recommend  PT follow for mobility progression. Progression toward goals:  []      Improving appropriately and progressing toward goals  [x]      Improving slowly and progressing toward goals  []      Not making progress toward goals and plan of care will be adjusted     PLAN:  Patient continues to benefit from skilled intervention to address the above impairments. Continue treatment per established plan of care.   Discharge Recommendations:  Home Health  Further Equipment Recommendations for Discharge:  N/A     SUBJECTIVE:   Patient stated My pain isn't too bad today.     OBJECTIVE DATA SUMMARY:   Critical Behavior:  Neurologic State: Alert  Orientation Level: Oriented X4  Cognition: Appropriate decision making, Follows commands  Safety/Judgement: Good awareness of safety precautions, Awareness of environment     Functional Mobility Training:  Bed Mobility:     Supine to Sit: Modified independent     Scooting: Modified independent        Transfers:  Sit to Stand: Stand-by asssistance  Stand to Sit: Stand-by asssistance                             Balance:  Sitting: Intact  Standing: Intact; With support  Standing - Static: Good  Standing - Dynamic : Good  Ambulation/Gait Training:  Distance (ft): 150 Feet (ft)  Assistive Device: Gait belt;Walker, rolling  Ambulation - Level of Assistance: Stand-by asssistance                 Base of Support: Narrowed  Stance: Left decreased  Speed/Henny: Pace decreased (<100 feet/min); Slow  Step Length: Left shortened;Right shortened       Stairs:  Number of Stairs Trained: 8  Stairs - Level of Assistance: Contact guard assistance  Rail Use: Right  (initially with SPC, then lateral approach)  Therapeutic Exercises:     EXERCISE   Sets   Reps   Active Active Assist   Passive Self ROM   Comments   Ankle Pumps 1 10 []                                        []                                        []                                        []                                           Quad Sets 1 10 []                                        []                                        []                                        []                                           Hamstring Sets   []                                        []                                        []                                        []                                           Short Arc Quads   []                                        [] []                                        []                                           Knee Extension Stretch     []                                          []                                          []                                          []                                           Heel Slides 1 10 []                                        []                                        []                                        []                                           Long Arc Quads   []                                        []                                        []                                        []                                           Knee Flexion Stretch 1 5 []                                        []                                        []                                        []                                           Straight Leg Raises   []                                        []                                        []                                        []                                             Pain:  Pain Scale 1: Numeric (0 - 10)  Pain Intensity 1: 0  Pain Location 1: Knee  Pain Orientation 1: Left  Pain Description 1: Aching  Pain Intervention(s) 1: Medication (see MAR)  Activity Tolerance:   VSS  Please refer to the flowsheet for vital signs taken during this treatment.   After treatment:   [x] Patient left in no apparent distress sitting up in chair  [] Patient left in no apparent distress in bed  [x] Call bell left within reach  [x] Nursing notified  [] Caregiver present  [] Bed alarm activated    COMMUNICATION/COLLABORATION:   The patients plan of care was discussed with: Physical Therapist and Registered Nurse    Michelle Main PT, DPT   Time Calculation: 19 mins

## 2017-11-02 NOTE — PROGRESS NOTES
I have reviewed discharge instructions with the patient. The patient verbalized understanding. Reviewed prescriptions, signs and symptoms to report and gave opportunity for questions. Patient left via wheelchair with volunteer assistance and driven home by friend.

## 2017-11-02 NOTE — PROGRESS NOTES
Pharmacist Note  Warfarin Dosing  Consult provided for this 78 y.o. female to manage warfarin for VTE PPx s/p LTK  - H/o TIA in 2006  - Not on anticoagulation at home    INR Goal: 1.7- 2.2  Therapy Day: 4    Drugs that may increase INR: None  Drugs that may decrease INR: None  Other current anticoagulants/ drugs that may increase bleeding risk: NSAID  Risk factors: Age > 65  Daily INR ordered: YES  Recent Labs      11/02/17   0818  11/01/17   0257  10/31/17   1005  10/31/17   0938   HGB   --   9.3*  9.8*   --    INR  1.6*  1.6*   --   1.2*       Date               INR                 Dose  10/16  1.0  ---  10/29  ---  None PreOp  10/30  ---  5 mg  10/31  1.2  5 mg   11/01  1.6  3 mg  11/02  1.6  5 mg    Assessment/ Plan: Will order warfarin 5 mg PO x 1 dose. Pharmacy will continue to monitor daily and adjust therapy as indicated.

## 2017-11-03 ENCOUNTER — NURSE NAVIGATOR (OUTPATIENT)
Dept: OTHER | Age: 67
End: 2017-11-03

## 2017-11-03 NOTE — PROGRESS NOTES
This note will not be viewable in [address] Post Discharge Follow-up contact after Joint Replacement    Patient discharged on 11/2/17  By  Eulalio Elizabeth   following  left knee Arthroplasty. Spoke with patient today, who reports they are \" Having a lot of pain 8/10. \"  Denies Fever, Shortness of Breath or Chest Pain. Home Health has not visited. Patient also reports:. Incision  clean, dry, intact  Calf is non-tender,   operative extremity has moderate swelling. Pain is not well managed. Discussed use of ice & elevation. She is exercising independently. Taking Coumadin for anticoagulation, oxycodone for pain. Patient has been taking oxy every 6 hours, prescription states every 4 hours as needed. Pt instructed to take more frequently and to add Tylenol every 6 hours - she has Tylenol 500mg but has not started taking yet. Patient is not experiencing symptoms of constipation & urinating without difficulty. Discussed side effects of anticoagulants & pain medications (bleeding/bruising, constipation, lightheaded/dizziness)  Follow up appointment is not scheduled; but patient states plan to schedule. Discussed calling surgeon Dr Kobe Dan  for drainage, bleeding, swelling in operative extremity, fever or pain. Discussed calling PCP Dr Yvon Gray with other medical issues.

## 2017-11-07 NOTE — DISCHARGE SUMMARY
@4KYNW@ [address] [place] [address]   [address] [address] SUMMARY     Patient: Claudeen Savers                             Medical Record Number: [id number]                : 1950  Age: 78 y.o. Admit Date: 10/30/2017  Discharge Date: 2017  Admission Diagnosis: Arthritis of knee, left [M17.12]  Discharge Diagnosis: Arthritis of knee, left  Procedures: Procedure(s):  LEFT TOTAL KNEE ARTHROPLASTY  Surgeon: Abdulaziz Mohamud  Assistants: None  Anesthesia: spinal  Complications: None     History of Present Illness:  Claudeen Savers is a 78 y.o. female with a history of Left knee pain, swelling, and marked loss of function. Despite conservative management and after clinical and radiographic evaluation, it was determined that she suffered from end-stage osteoarthritis and would benefit from Procedure(s):  LEFT TOTAL KNEE ARTHROPLASTY, which she consented to undergo after a discussion of the risks, benefits, alternatives, rehab concerns, and potential complications of surgery. Hospital Course:  Claudeen Savers tolerated the procedure well. She was transferred  to the recovery room in stable condition. After a brief stay the patient was then transferred to the Joint Replacement Unit at [address] [place] [address].  On postoperative day #1, the dressing was clean and dry, she was neurovascularly intact. The patient was afebrile and vital signs were stable. Calves were soft and non-tender bilaterally. On postoperative day  # 2, the patient was tolerating a regular diet and making satisfactory progress with physical therapy. Hemoglobin and INR prior to discharge were   Lab Results   Component Value Date/Time    HGB 9.3 2017 02:57 AM    INR 1.6 2017 02:57 AM    INR (POC) 1.6 2017 08:18 AM   .  Viktoriya Aguilar made satisfactory progress with physical therapy and was discharged to Home in stable condition on postoperative day 3.   She was provided with routine postoperative instructions and advised to follow up in my office in 3 weeks following discharge from the hospital.  She was prescribed Coumadin for DVT prophylaxis and oxycodone for post-operative pain. Discharge Medications:  Cannot display discharge medications since this patient is not currently admitted.       Signed by: Shakir Navas MD  11/7/2017

## 2019-02-21 ENCOUNTER — HOSPITAL ENCOUNTER (OUTPATIENT)
Dept: MRI IMAGING | Age: 69
Discharge: HOME | End: 2019-02-21
Attending: PHYSICAL MEDICINE & REHABILITATION
Payer: MEDICARE

## 2019-02-21 DIAGNOSIS — M47.816 SPONDYLOSIS WITHOUT MYELOPATHY OR RADICULOPATHY, LUMBAR REGION: ICD-10-CM

## 2019-02-21 DIAGNOSIS — M53.3 SACROILIAC JOINT DYSFUNCTION: ICD-10-CM

## 2019-02-21 DIAGNOSIS — M54.50 LOW BACK PAIN: ICD-10-CM

## 2019-02-21 PROCEDURE — 72148 MRI LUMBAR SPINE W/O DYE: CPT

## 2019-03-10 ENCOUNTER — APPOINTMENT (OUTPATIENT)
Dept: VASCULAR SURGERY | Age: 69
End: 2019-03-10
Attending: EMERGENCY MEDICINE
Payer: MEDICARE

## 2019-03-10 ENCOUNTER — HOSPITAL ENCOUNTER (EMERGENCY)
Age: 69
Discharge: HOME | End: 2019-03-10
Attending: STUDENT IN AN ORGANIZED HEALTH CARE EDUCATION/TRAINING PROGRAM
Payer: MEDICARE

## 2019-03-10 ENCOUNTER — APPOINTMENT (OUTPATIENT)
Dept: GENERAL RADIOLOGY | Age: 69
End: 2019-03-10
Attending: EMERGENCY MEDICINE
Payer: MEDICARE

## 2019-03-10 VITALS
TEMPERATURE: 97.3 F | DIASTOLIC BLOOD PRESSURE: 84 MMHG | SYSTOLIC BLOOD PRESSURE: 155 MMHG | BODY MASS INDEX: 29.57 KG/M2 | HEART RATE: 85 BPM | OXYGEN SATURATION: 100 % | RESPIRATION RATE: 16 BRPM | HEIGHT: 66 IN | WEIGHT: 184 LBS

## 2019-03-10 DIAGNOSIS — S40.012A CONTUSION OF LEFT SHOULDER, INITIAL ENCOUNTER: ICD-10-CM
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
## 2019-03-10 DIAGNOSIS — M25.552 LEFT HIP PAIN: ICD-10-CM

## 2019-03-10 DIAGNOSIS — M79.669 PAIN AND SWELLING OF LOWER LEG, UNSPECIFIED LATERALITY: ICD-10-CM

## 2019-03-10 DIAGNOSIS — M79.89 PAIN AND SWELLING OF LOWER LEG, UNSPECIFIED LATERALITY: ICD-10-CM

## 2019-03-10 PROCEDURE — 99282 EMERGENCY DEPT VISIT SF MDM: CPT

## 2019-03-10 PROCEDURE — 73502 X-RAY EXAM HIP UNI 2-3 VIEWS: CPT

## 2019-03-10 PROCEDURE — 93970 EXTREMITY STUDY: CPT

## 2019-03-10 PROCEDURE — 73030 X-RAY EXAM OF SHOULDER: CPT

## 2019-03-10 NOTE — DISCHARGE INSTRUCTIONS
Patient Education        Preventing Falls: Care Instructions  Your Care Instructions    Getting around your home safely can be a challenge if you have injuries or health problems that make it easy for you to fall. Loose rugs and furniture in walkways are among the dangers for many older people who have problems walking or who have poor eyesight. People who have conditions such as arthritis, osteoporosis, or dementia also have to be careful not to fall. You can make your home safer with a few simple measures. Follow-up care is a key part of your treatment and safety. Be sure to make and go to all appointments, and call your doctor if you are having problems. It's also a good idea to know your test results and keep a list of the medicines you take. How can you care for yourself at home? Taking care of yourself  · You may get dizzy if you do not drink enough water. To prevent dehydration, drink plenty of fluids, enough so that your urine is light yellow or clear like water. Choose water and other caffeine-free clear liquids. If you have kidney, heart, or liver disease and have to limit fluids, talk with your doctor before you increase the amount of fluids you drink. · Exercise regularly to improve your strength, muscle tone, and balance. Walk if you can. Swimming may be a good choice if you cannot walk easily. · Have your vision and hearing checked each year or any time you notice a change. If you have trouble seeing and hearing, you might not be able to avoid objects and could lose your balance. · Know the side effects of the medicines you take. Ask your doctor or pharmacist whether the medicines you take can affect your balance. Sleeping pills or sedatives can affect your balance. · Limit the amount of alcohol you drink. Alcohol can impair your balance and other senses. · Ask your doctor whether calluses or corns on your feet need to be removed.  If you wear loose-fitting shoes because of calluses or corns, you can lose your balance and fall. · Talk to your doctor if you have numbness in your feet. Preventing falls at home  · Remove raised doorway thresholds, throw rugs, and clutter. Repair loose carpet or raised areas in the floor. · Move furniture and electrical cords to keep them out of walking paths. · Use nonskid floor wax, and wipe up spills right away, especially on ceramic tile floors. · If you use a walker or cane, put rubber tips on it. If you use crutches, clean the bottoms of them regularly with an abrasive pad, such as steel wool. · Keep your house well lit, especially [place], and outside walkways. Use night-lights in areas such as hallways and bathrooms. Add extra light switches or use remote switches (such as switches that go on or off when you clap your hands) to make it easier to turn lights on if you have to get up during the night. · Install sturdy handrails on stairways. · Move items in your cabinets so that the things you use a lot are on the lower shelves (about waist level). · Keep a cordless phone and a flashlight with new batteries by your bed. If possible, put a phone in each of the main rooms of your house, or carry a cell phone in case you fall and cannot reach a phone. Or, you can wear a device around your neck or wrist. You push a button that sends a signal for help. · Wear low-heeled shoes that fit well and give your feet good support. Use footwear with nonskid soles. Check the heels and soles of your shoes for wear. Repair or replace worn heels or soles. · Do not wear socks without shoes on wood floors. · Walk on the grass when the sidewalks are slippery. If you live in an area that gets snow and ice in the winter, sprinkle salt on slippery steps and sidewalks. Preventing falls in the bath  · Install grab bars and nonskid mats inside and outside your shower or tub and near the toilet and sinks. · Use shower chairs and bath benches.   · Use a hand-held shower head that will allow you to sit while showering. · Get into a tub or shower by putting the weaker leg in first. Get out of a tub or shower with your strong side first.  · Repair loose toilet seats and consider installing a raised toilet seat to make getting on and off the toilet easier. · Keep your bathroom door unlocked while you are in the shower. Where can you learn more? Go to http://santana-tiffanie.info/. Enter [id number] [id number] in the search box to learn more about \"Preventing Falls: Care Instructions. \"  Current as of: March 15, 2018  Content Version: 11.9  © 2572-3984 [employer]. Care instructions adapted under license by [employer] (which disclaims liability or warranty for this information). If you have questions about a medical condition or this instruction, always ask your healthcare professional. [place] [address] any warranty or liability for your use of this information. Patient Education        Hip Pain: Care Instructions  Your Care Instructions    Hip pain may be caused by many things, including overuse, a fall, or a twisting movement. Another cause of hip pain is arthritis. Your pain may increase when you stand up, walk, or squat. The pain may come and go or may be constant. Home treatment can help relieve hip pain, swelling, and stiffness. If your pain is ongoing, you may need more tests and treatment. Follow-up care is a key part of your treatment and safety. Be sure to make and go to all appointments, and call your doctor if you are having problems. It's also a good idea to know your test results and keep a list of the medicines you take. How can you care for yourself at home? · Take pain medicines exactly as directed. ? If the doctor gave you a prescription medicine for pain, take it as prescribed. ? If you are not taking a prescription pain medicine, ask your doctor if you can take an over-the-counter medicine.   · Rest and protect your hip. Take a break from any activity, including standing or walking, that may cause pain. · Put ice or a cold pack against your hip for 10 to 20 minutes at a time. Try to do this every 1 to 2 hours for the next 3 days (when you are awake) or until the swelling goes down. Put a thin cloth between the ice and your skin. · Sleep on your healthy side with a pillow between your knees, or sleep on your back with pillows under your knees. · If there is no swelling, you can put moist heat, a heating pad, or a warm cloth on your hip. Do gentle stretching exercises to help keep your hip flexible. · Learn how to prevent falls. Have your vision and hearing checked regularly. Wear slippers or shoes with a nonskid sole. · Stay at a healthy weight. · Wear comfortable shoes. When should you call for help? Call 911 anytime you think you may need emergency care. For example, call if:    · You have sudden chest pain and shortness of breath, or you cough up blood.     · You are not able to stand or walk or bear weight.     · Your buttocks, legs, or feet feel numb or tingly.     · Your leg or foot is cool or pale or changes color.     · You have severe pain.    Call your doctor now or seek immediate medical care if:    · You have signs of infection, such as:  ? Increased pain, swelling, warmth, or redness in the hip area. ? Red streaks leading from the hip area. ? Pus draining from the hip area. ? A fever.     · You have signs of a blood clot, such as:  ? Pain in your calf, back of the knee, thigh, or groin. ? Redness and swelling in your leg or groin.     · You are not able to bend, straighten, or move your leg normally.     · You have trouble urinating or having bowel movements.    Watch closely for changes in your health, and be sure to contact your doctor if:    · You do not get better as expected. Where can you learn more? Go to http://rob.info/.   Enter D936 in the search box to learn more about \"Hip Pain: Care Instructions. \"  Current as of: September 23, 2018  Content Version: 11.9  © 2006-2018 [employer]. Care instructions adapted under license by [employer] (which disclaims liability or warranty for this information). If you have questions about a medical condition or this instruction, always ask your healthcare professional. [address] any warranty or liability for your use of this information. Patient Education        Joint Pain: Care Instructions  Your Care Instructions    Many people have small aches and pains from overuse or injury to muscles and joints. Joint injuries often happen during sports or recreation, work tasks, or projects around the home. An overuse injury can happen when you put too much stress on a joint or when you do an activity that stresses the joint over and over, such as using the computer or rowing a boat. You can take action at home to help your muscles and joints get better. You should feel better in 1 to 2 weeks, but it can take 3 months or more to heal completely. Follow-up care is a key part of your treatment and safety. Be sure to make and go to all appointments, and call your doctor if you are having problems. It's also a good idea to know your test results and keep a list of the medicines you take. How can you care for yourself at home? · Do not put weight on the injured joint for at least a day or two. · For the first day or two after an injury, do not take hot showers or baths, and do not use hot packs. The heat could make swelling worse. · Put ice or a cold pack on the sore joint for 10 to 20 minutes at a time. Try to do this every 1 to 2 hours for the next 3 days (when you are awake) or until the swelling goes down. Put a thin cloth between the ice and your skin. · Wrap the injury in an elastic bandage. Do not wrap it too tightly because this can cause more swelling.   · Prop up the sore joint on a pillow when you ice it or anytime you sit or lie down during the next 3 days. Try to keep it above the level of your heart. This will help reduce swelling. · Take an over-the-counter pain medicine, such as acetaminophen (Tylenol), ibuprofen (Advil, Motrin), or naproxen (Aleve). Read and follow all instructions on the label. · After 1 or 2 days of rest, begin moving the joint gently. While the joint is still healing, you can begin to exercise using activities that do not strain or hurt the painful joint. When should you call for help? Call your doctor now or seek immediate medical care if:    · You have signs of infection, such as:  ? Increased pain, swelling, warmth, and redness. ? Red streaks leading from the joint. ? A fever.    Watch closely for changes in your health, and be sure to contact your doctor if:    · Your movement or symptoms are not getting better after 1 to 2 weeks of home treatment. Where can you learn more? Go to http://santana-tiffanie.info/. Enter P205 in the search box to learn more about \"Joint Pain: Care Instructions. \"  Current as of: September 20, 2018  Content Version: 11.9  © 4049-2878 [email or web address]. Care instructions adapted under license by [employer] (which disclaims liability or warranty for this information). If you have questions about a medical condition or this instruction, always ask your healthcare professional. [place] [address] any warranty or liability for your use of this information. Patient Education        Leg Pain: Care Instructions  Your Care Instructions  Many things can cause leg pain. Too much exercise or overuse can cause a muscle cramp (or charley horse). You can get leg cramps from not eating a balanced diet that has enough potassium, calcium, and other minerals. If you do not drink enough fluids or are taking certain medicines, you may develop leg cramps.  Other causes of leg pain include injuries, blood flow problems, nerve damage, and twisted and enlarged veins (varicose veins). You can usually ease pain with self-care. Your doctor may recommend that you rest your leg and keep it elevated. Follow-up care is a key part of your treatment and safety. Be sure to make and go to all appointments, and call your doctor if you are having problems. It's also a good idea to know your test results and keep a list of the medicines you take. How can you care for yourself at home? · Take pain medicines exactly as directed. ? If the doctor gave you a prescription medicine for pain, take it as prescribed. ? If you are not taking a prescription pain medicine, ask your doctor if you can take an over-the-counter medicine. · Take any other medicines exactly as prescribed. Call your doctor if you think you are having a problem with your medicine. · Rest your leg while you have pain, and avoid standing for long periods of time. · Prop up your leg at or above the level of your heart when possible. · Make sure you are eating a balanced diet that is rich in calcium, potassium, and magnesium, especially if you are pregnant. · If directed by your doctor, put ice or a cold pack on the area for 10 to 20 minutes at a time. Put a thin cloth between the ice and your skin. · Your leg may be in a splint, a brace, or an elastic bandage, and you may have crutches to help you walk. Follow your doctor's directions about how long to wear supports and how to use the crutches. When should you call for help? Call 911 anytime you think you may need emergency care.  For example, call if:    · You have sudden chest pain and shortness of breath, or you cough up blood.     · Your leg is cool or pale or changes color.    Call your doctor now or seek immediate medical care if:    · You have increasing or severe pain.     · Your leg suddenly feels weak and you cannot move it.     · You have signs of a blood clot, such as:  ? Pain in your calf, back of the knee, thigh, or groin. ? Redness and swelling in your leg or groin.     · You have signs of infection, such as:  ? Increased pain, swelling, warmth, or redness. ? Red streaks leading from the sore area. ? Pus draining from a place on your leg. ? A fever.     · You cannot bear weight on your leg.    Watch closely for changes in your health, and be sure to contact your doctor if:    · You do not get better as expected. Where can you learn more? Go to http://santana-tiffanie.info/. Enter V588 in the search box to learn more about \"Leg Pain: Care Instructions. \"  Current as of: September 23, 2018  Content Version: 11.9  © 7077-8347 [employer]. Care instructions adapted under license by [employer] (which disclaims liability or warranty for this information). If you have questions about a medical condition or this instruction, always ask your healthcare professional. [place] [address] any warranty or liability for your use of this information.

## 2019-03-10 NOTE — ED PROVIDER NOTES
Briefly patient c/o an episodes of falling down 2 stairs, possible syncope, no antecedent sx such as dizziness, pain over shoulders, knees, r ankle. Multiple recent falls. Recently started tylenol with codeine. 11:19 AM  I have evaluated the patient as the Provider in Triage. I have reviewed Her vital signs and the triage nurse assessment. I have talked with the patient and any available family and advised that I am the provider in triage and have ordered the appropriate study to initiate their work up based on the clinical presentation during my assessment. I have advised that the patient will be accommodated in the Main ED as soon as possible. I have also requested to contact the triage nurse or myself immediately if the patient experiences any changes in their condition during this brief waiting period.   Lexis Mullins MD               Past Medical History:   Diagnosis Date    Arthritis     GERD (gastroesophageal reflux disease)     Hypercholesterolemia     Hypertension     Other ill-defined conditions(799.89)     MGUS (MONOCLONAL GAMMOPATHY OF UNCLEAR SIGNIFICANCE)    Prediabetes     Seizures ([facility] [address] 2016    PER NEUROLOGIST ABSENCE SEIZURES, patient states    Stroke [facility]) 2006    TIA    Syncope and collapse     loop recorder placed 4/2016 (syncope of unknown etiology)       Past Surgical History:   Procedure Laterality Date    HX APPENDECTOMY      REMOVED DURING HYSTERECTOMY    HX COLONOSCOPY      HX DILATION AND CURETTAGE      HX HYSTERECTOMY  1996    HX IMPLANTABLE LOOP RECORDER  04/29/2016    HX KNEE ARTHROSCOPY  12/17/2009/ AND 2008    RIGHT KNEE    HX KNEE REPLACEMENT Right 2013 2016    HX ORTHOPAEDIC  9/09 AND  5/24/2011    RIGHT TOTAL KNEE    HX ORTHOPAEDIC  1/31/2011   10/24/2011    REMOVED KNEE REPLACEMENT    HX ORTHOPAEDIC Left 09/05/2017    HAND    HX ROTATOR CUFF REPAIR Right 2008    HX SHOULDER ARTHROSCOPY  2003    RIGHT         Family History:   Problem Relation Age of Onset    Hypertension Mother     Diabetes Mother     Heart Disease Mother         MI    Heart Attack Mother     Heart Disease Father         MI    Heart Attack Father     Heart Surgery Sister         STENT, VALVE SURGERY    Hypertension Sister     Diabetes Sister     Heart Attack Brother     Hypertension Brother     Diabetes Sister     Hypertension Sister     Elevated Lipids Sister     Anesth Problems Neg Hx        Social History     Socioeconomic History    Marital status:      Spouse name: Not on file    Number of children: Not on file    Years of education: Not on file    Highest education level: Not on file   Social Needs    Financial resource strain: Not on file    Food insecurity - worry: Not on file    Food insecurity - inability: Not on file   Attendify needs - medical: Not on file   Attendify needs - non-medical: Not on file   Occupational History    Not on file   Tobacco Use    Smoking status: Never Smoker    Smokeless tobacco: Never Used   Substance and Sexual Activity    Alcohol use: No     Comment: RARE- WINE 1/YR    Drug use: No    Sexual activity: Not on file   Other Topics Concern    Not on file   Social History Narrative    Not on file         ALLERGIES: Patient has no known allergies. Review of Systems   Constitutional: Positive for activity change. Negative for appetite change and fever. HENT: Negative for congestion and trouble swallowing. Respiratory: Negative for cough and shortness of breath. Cardiovascular: Positive for leg swelling. Negative for chest pain and palpitations. Gastrointestinal: Negative for abdominal pain, nausea and vomiting. Genitourinary: Negative for dysuria. Musculoskeletal: Positive for arthralgias, joint swelling and myalgias. Skin: Negative for rash. Neurological: Negative for weakness and headaches. All other systems reviewed and are negative.       Vitals:    03/10/19 1116   BP: 155/84 Pulse: 85   Resp: 16   Temp: 97.3 °F (36.3 °C)   SpO2: 100%   Weight: 83.5 kg (184 lb)   Height: 5' 6\" (1.676 m)            Physical Exam   Constitutional:   Obese black female; non smoker   HENT:   Right Ear: External ear normal.   Left Ear: External ear normal.   Mouth/Throat: Oropharynx is clear and moist.   Neck: Normal range of motion. Neck supple. Cardiovascular: Normal rate and regular rhythm. Pulmonary/Chest: Effort normal and breath sounds normal.   Abdominal: Soft. Bowel sounds are normal.   Musculoskeletal: Normal range of motion. She exhibits edema and tenderness. She exhibits no deformity. Reports left hip area tenderness with active ROM; Skin integrity is intact. There is no obvious deformity, bruising or erythema. Good neurovascular sensation. No apparent tendon or nerve injury. Reports left shoulder pain with active ROM; Skin integrity is intact. There is no obvious bony deformity, bruising or erythema. Good neurovascular sensation. No apparent tendon or nerve injury. Bilateral lower leg swelling with ecchymosis to the left lower leg; Skin integrity is intact. There is no obvious bony deformity. Good neurovascular sensation. No obvious joint effusion or joint instability. Pain increases with weight bearing; flexion and extension. Lymphadenopathy:     She has no cervical adenopathy. Nursing note and vitals reviewed. MDM       Procedures    Dr. Doc Iqbal examined the pt and discussed the plan of care. 1:23 PM  Patient's results and plan of care have been reviewed with her. Patient and/or family have verbally conveyed their understanding and agreement of the patient's signs, symptoms, diagnosis, treatment and prognosis and additionally agree to follow up as recommended or return to the Emergency Room should her condition change prior to follow-up.   Discharge instructions have also been provided to the patient with some educational information regarding her diagnosis as well a list of reasons why she would want to return to the ER prior to her follow-up appointment should her condition change. Dionicio Hardy NP          I personally saw and examined the patient. I have reviewed and agree with the MLP's findings, including all diagnostic interpretations, and plans as written. I was present during the key portions of separately billed procedures.     Zoey Munoz MD

## 2019-03-18 ENCOUNTER — HOSPITAL ENCOUNTER (EMERGENCY)
Age: 69
Discharge: HOME | End: 2019-03-18
Attending: EMERGENCY MEDICINE
Payer: MEDICARE

## 2019-03-18 ENCOUNTER — APPOINTMENT (OUTPATIENT)
Dept: ULTRASOUND IMAGING | Age: 69
End: 2019-03-18
Attending: PHYSICIAN ASSISTANT
Payer: MEDICARE

## 2019-03-18 VITALS
SYSTOLIC BLOOD PRESSURE: 140 MMHG | HEART RATE: 88 BPM | TEMPERATURE: 97.5 F | BODY MASS INDEX: 30.22 KG/M2 | DIASTOLIC BLOOD PRESSURE: 62 MMHG | OXYGEN SATURATION: 100 % | RESPIRATION RATE: 16 BRPM | WEIGHT: 188 LBS | HEIGHT: 66 IN

## 2019-03-18 DIAGNOSIS — L02.415 ABSCESS OF RIGHT LOWER LEG: Primary | ICD-10-CM

## 2019-03-18 DIAGNOSIS — L03.115 CELLULITIS OF RIGHT LOWER LEG: ICD-10-CM

## 2019-03-18 LAB
ALBUMIN SERPL-MCNC: 3.4 G/DL (ref 3.5–5)
ALBUMIN/GLOB SERPL: 0.8 {RATIO} (ref 1.1–2.2)
ALP SERPL-CCNC: 151 U/L (ref 45–117)
ALT SERPL-CCNC: 29 U/L (ref 12–78)
ANION GAP SERPL CALC-SCNC: 6 MMOL/L (ref 5–15)
AST SERPL-CCNC: 21 U/L (ref 15–37)
BASOPHILS # BLD: 0.1 K/UL (ref 0–0.1)
BASOPHILS NFR BLD: 1 % (ref 0–1)
BILIRUB SERPL-MCNC: 0.3 MG/DL (ref 0.2–1)
BUN SERPL-MCNC: 24 MG/DL (ref 6–20)
BUN/CREAT SERPL: 23 (ref 12–20)
CALCIUM SERPL-MCNC: 9.3 MG/DL (ref 8.5–10.1)
CHLORIDE SERPL-SCNC: 107 MMOL/L (ref 97–108)
CO2 SERPL-SCNC: 27 MMOL/L (ref 21–32)
CREAT SERPL-MCNC: 1.04 MG/DL (ref 0.55–1.02)
DIFFERENTIAL METHOD BLD: ABNORMAL
EOSINOPHIL # BLD: 0.2 K/UL (ref 0–0.4)
EOSINOPHIL NFR BLD: 2 % (ref 0–7)
ERYTHROCYTE [DISTWIDTH] IN BLOOD BY AUTOMATED COUNT: 15.5 % (ref 11.5–14.5)
GLOBULIN SER CALC-MCNC: 4.5 G/DL (ref 2–4)
GLUCOSE SERPL-MCNC: 104 MG/DL (ref 65–100)
HCT VFR BLD AUTO: 36.6 % (ref 35–47)
HGB BLD-MCNC: 11.1 G/DL (ref 11.5–16)
IMM GRANULOCYTES # BLD AUTO: 0.1 K/UL (ref 0–0.04)
IMM GRANULOCYTES NFR BLD AUTO: 1 % (ref 0–0.5)
LYMPHOCYTES # BLD: 1.7 K/UL (ref 0.8–3.5)
LYMPHOCYTES NFR BLD: 20 % (ref 12–49)
MCH RBC QN AUTO: 28.7 PG (ref 26–34)
MCHC RBC AUTO-ENTMCNC: 30.3 G/DL (ref 30–36.5)
MCV RBC AUTO: 94.6 FL (ref 80–99)
MONOCYTES # BLD: 0.4 K/UL (ref 0–1)
MONOCYTES NFR BLD: 5 % (ref 5–13)
NEUTS SEG # BLD: 6 K/UL (ref 1.8–8)
NEUTS SEG NFR BLD: 71 % (ref 32–75)
NRBC # BLD: 0 K/UL (ref 0–0.01)
NRBC BLD-RTO: 0 PER 100 WBC
PLATELET # BLD AUTO: 305 K/UL (ref 150–400)
PMV BLD AUTO: 10.5 FL (ref 8.9–12.9)
POTASSIUM SERPL-SCNC: 3.4 MMOL/L (ref 3.5–5.1)
PROT SERPL-MCNC: 7.9 G/DL (ref 6.4–8.2)
RBC # BLD AUTO: 3.87 M/UL (ref 3.8–5.2)
SODIUM SERPL-SCNC: 140 MMOL/L (ref 136–145)
WBC # BLD AUTO: 8.4 K/UL (ref 3.6–11)

## 2019-03-18 PROCEDURE — 77030019895 HC PCKNG STRP IODO -A

## 2019-03-18 PROCEDURE — 87205 SMEAR GRAM STAIN: CPT

## 2019-03-18 PROCEDURE — 75810000289 HC I&D ABSCESS SIMP/COMP/MULT

## 2019-03-18 PROCEDURE — 76882 US LMTD JT/FCL EVL NVASC XTR: CPT

## 2019-03-18 PROCEDURE — 96374 THER/PROPH/DIAG INJ IV PUSH: CPT

## 2019-03-18 PROCEDURE — 36415 COLL VENOUS BLD VENIPUNCTURE: CPT

## 2019-03-18 PROCEDURE — 99283 EMERGENCY DEPT VISIT LOW MDM: CPT

## 2019-03-18 PROCEDURE — 80053 COMPREHEN METABOLIC PANEL: CPT

## 2019-03-18 PROCEDURE — 74011000250 HC RX REV CODE- 250: Performed by: PHYSICIAN ASSISTANT

## 2019-03-18 PROCEDURE — 74011250636 HC RX REV CODE- 250/636: Performed by: PHYSICIAN ASSISTANT

## 2019-03-18 PROCEDURE — 85025 COMPLETE CBC W/AUTO DIFF WBC: CPT

## 2019-03-18 PROCEDURE — 87040 BLOOD CULTURE FOR BACTERIA: CPT

## 2019-03-18 RX ORDER — ASPIRIN 81 MG/1
81 TABLET ORAL DAILY
COMMUNITY
End: 2022-06-14

## 2019-03-18 RX ORDER — METHOTREXATE 2.5 MG/1
22.5 TABLET ORAL
COMMUNITY

## 2019-03-18 RX ORDER — DOXYCYCLINE HYCLATE 100 MG
100 TABLET ORAL 2 TIMES DAILY
Qty: 20 TAB | Refills: 0 | Status: SHIPPED | OUTPATIENT
Start: 2019-03-18 | End: 2019-03-28

## 2019-03-18 RX ORDER — CEPHALEXIN 500 MG/1
500 CAPSULE ORAL 4 TIMES DAILY
COMMUNITY
End: 2019-03-22

## 2019-03-18 RX ORDER — TRAMADOL HYDROCHLORIDE 50 MG/1
50 TABLET ORAL
Qty: 10 TAB | Refills: 0 | Status: SHIPPED | OUTPATIENT
Start: 2019-03-18 | End: 2019-03-25

## 2019-03-18 RX ORDER — ACETAMINOPHEN 325 MG/1
325 TABLET ORAL
COMMUNITY
End: 2019-03-22

## 2019-03-18 RX ORDER — FOLIC ACID 1 MG/1
1 TABLET ORAL DAILY
COMMUNITY

## 2019-03-18 RX ORDER — NAPROXEN 500 MG/1
500 TABLET ORAL
COMMUNITY
End: 2019-03-22

## 2019-03-18 RX ORDER — BUPIVACAINE HYDROCHLORIDE 5 MG/ML
10 INJECTION, SOLUTION EPIDURAL; INTRACAUDAL ONCE
Status: DISCONTINUED | OUTPATIENT
Start: 2019-03-18 | End: 2019-03-18 | Stop reason: HOSPADM

## 2019-03-18 RX ADMIN — CEFTRIAXONE SODIUM 1 G: 1 INJECTION, POWDER, FOR SOLUTION INTRAMUSCULAR; INTRAVENOUS at 11:12

## 2019-03-18 NOTE — ED NOTES
Pt was difficulty stick, one set of blood cultures complete, pt wbc is within normal limits and does not meet sepsis protocal, per PA do not need to obtain second set of cultures and can started antibiotics at this time.

## 2019-03-18 NOTE — ED TRIAGE NOTES
Pt was seen here on 3/10 for fall and noted with redness at the time and had vascular study with was negative for DVT. Pt was seen at PCP on 3/15 because redness, warmth, and swelling and started on Cephalexin 500mg daily. Redness has now extended past margins that were drawn.

## 2019-03-18 NOTE — DISCHARGE INSTRUCTIONS
Warm moist compresses 4-6 times a day for 10-15 minutes each time. DO NOT PULL THE PACKING. Tylenol for pain and swelling       Cellulitis: Care Instructions  Your Care Instructions    Cellulitis is a skin infection caused by bacteria, most often strep or staph. It often occurs after a break in the skin from a scrape, cut, bite, or puncture, or after a rash. Cellulitis may be treated without doing tests to find out what caused it. But your doctor may do tests, if needed, to look for a specific bacteria, like methicillin-resistant Staphylococcus aureus (MRSA). The doctor has checked you carefully, but problems can develop later. If you notice any problems or new symptoms, get medical treatment right away. Follow-up care is a key part of your treatment and safety. Be sure to make and go to all appointments, and call your doctor if you are having problems. It's also a good idea to know your test results and keep a list of the medicines you take. How can you care for yourself at home? · Take your antibiotics as directed. Do not stop taking them just because you feel better. You need to take the full course of antibiotics. · Prop up the infected area on pillows to reduce pain and swelling. Try to keep the area above the level of your heart as often as you can. · If your doctor told you how to care for your wound, follow your doctor's instructions. If you did not get instructions, follow this general advice:  ? Wash the wound with clean water 2 times a day. Don't use hydrogen peroxide or alcohol, which can slow healing. ? You may cover the wound with a thin layer of petroleum jelly, such as Vaseline, and a nonstick bandage. ? Apply more petroleum jelly and replace the bandage as needed. · Be safe with medicines. Take pain medicines exactly as directed. ? If the doctor gave you a prescription medicine for pain, take it as prescribed.   ? If you are not taking a prescription pain medicine, ask your doctor if you can take an over-the-counter medicine. To prevent cellulitis in the future  · Try to prevent cuts, scrapes, or other injuries to your skin. Cellulitis most often occurs where there is a break in the skin. · If you get a scrape, cut, mild burn, or bite, wash the wound with clean water as soon as you can to help avoid infection. Don't use hydrogen peroxide or alcohol, which can slow healing. · If you have swelling in your legs (edema), support stockings and good skin care may help prevent leg sores and cellulitis. · Take care of your feet, especially if you have diabetes or other conditions that increase the risk of infection. Wear shoes and socks. Do not go barefoot. If you have athlete's foot or other skin problems on your feet, talk to your doctor about how to treat them. When should you call for help? Call your doctor now or seek immediate medical care if:    · You have signs that your infection is getting worse, such as:  ? Increased pain, swelling, warmth, or redness. ? Red streaks leading from the area. ? Pus draining from the area. ? A fever.     · You get a rash.    Watch closely for changes in your health, and be sure to contact your doctor if:    · You do not get better as expected. Where can you learn more? Go to http://santana-tiffanie.info/. Brody Barronjoan in the search box to learn more about \"Cellulitis: Care Instructions. \"  Current as of: April 17, 2018  Content Version: 11.9  © 4228-1063 [employer]. Care instructions adapted under license by [employer] (which disclaims liability or warranty for this information). If you have questions about a medical condition or this instruction, always ask your healthcare professional. [place] [address] any warranty or liability for your use of this information.          Patient Education        Skin Abscess: Care Instructions  Your Care Instructions    A skin abscess is a bacterial infection that forms a pocket of pus. A boil is a kind of skin abscess. The doctor may have cut an opening in the abscess so that the pus can drain out. You may have gauze in the cut so that the abscess will stay open and keep draining. You may need antibiotics. You will need to follow up with your doctor to make sure the infection has gone away. The doctor has checked you carefully, but problems can develop later. If you notice any problems or new symptoms, get medical treatment right away. Follow-up care is a key part of your treatment and safety. Be sure to make and go to all appointments, and call your doctor if you are having problems. It's also a good idea to know your test results and keep a list of the medicines you take. How can you care for yourself at home? · Apply warm and dry compresses, a heating pad set on low, or a hot water bottle 3 or 4 times a day for pain. Keep a cloth between the heat source and your skin. · If your doctor prescribed antibiotics, take them as directed. Do not stop taking them just because you feel better. You need to take the full course of antibiotics. · Take pain medicines exactly as directed. ? If the doctor gave you a prescription medicine for pain, take it as prescribed. ? If you are not taking a prescription pain medicine, ask your doctor if you can take an over-the-counter medicine. · Keep your bandage clean and dry. Change the bandage whenever it gets wet or dirty, or at least one time a day. · If the abscess was packed with gauze:  ? Keep follow-up appointments to have the gauze changed or removed. If the doctor instructed you to remove the gauze, follow the instructions you were given for how to remove it. ? After the gauze is removed, soak the area in warm water for 15 to 20 minutes 2 times a day, until the wound closes. When should you call for help?   Call your doctor now or seek immediate medical care if:    · You have signs of worsening infection, such as:  ? Increased pain, swelling, warmth, or redness. ? Red streaks leading from the infected skin. ? Pus draining from the wound. ? A fever.    Watch closely for changes in your health, and be sure to contact your doctor if:    · You do not get better as expected. Where can you learn more? Go to http://santana-tiffanie.info/. Enter O977 in the search box to learn more about \"Skin Abscess: Care Instructions. \"  Current as of: April 17, 2018  Content Version: 11.9  © 0507-5584 [employer]. Care instructions adapted under license by [employer] (which disclaims liability or warranty for this information). If you have questions about a medical condition or this instruction, always ask your healthcare professional. [address] any warranty or liability for your use of this information.

## 2019-03-18 NOTE — ED PROVIDER NOTES
70 y.o. female with past medical history significant for hypertension, arthritis, and prediabetes who presents from home via a private vehicle with chief complaint of right lower leg cellulitis. Pt reports she was seen here on the 10th for a fall and then on the 15th by [address] [place] [address] Urgent Care in [place] for continuing right lower leg cellulitis. Pt reports she had a Duplex US on the 10th that was negative. Pt reports she had an x-ray of her right lower leg on the 15th that was negative and was started on Keflex. Pt reports she was told to come to the ED if there was no improvement in the cellulitis over the weekend. Pt reports the redness has extended beyond the borders drawn by her urgent care provider. Pt reports she has appointment tomorrow with her PCP for this problem. Of note, pt complains of chronic leg swelling, leg pain, and lower extremity arthralgias. Pt denies any fever, chills, nausea, vomiting, diarrhea, chest pain, dyspnea, hematuria, melena, hematochezia, dysuria, or any other symptoms. There are no other acute medical concerns at this time. Social hx - Tobacco use: none, Alcohol Use: none, Illicit Drugs: none    PCP: Daron South MD    Note written by Ruthie Umaña, as dictated by MINGO Stuart 9:54 AM.          The history is provided by the patient and medical records. No  was used.         Past Medical History:   Diagnosis Date    Arthritis     GERD (gastroesophageal reflux disease)     Hypercholesterolemia     Hypertension     Other ill-defined conditions(799.89)     MGUS (MONOCLONAL GAMMOPATHY OF UNCLEAR SIGNIFICANCE)    Prediabetes     Seizures ([facility] [address] 2016    PER NEUROLOGIST ABSENCE SEIZURES, patient states    Stroke [facility]) 2006    TIA    Syncope and collapse     loop recorder placed 4/2016 (syncope of unknown etiology)       Past Surgical History:   Procedure Laterality Date    HX APPENDECTOMY      REMOVED DURING HYSTERECTOMY    HX COLONOSCOPY      HX DILATION AND CURETTAGE      HX HYSTERECTOMY  1996    HX IMPLANTABLE LOOP RECORDER  04/29/2016    HX KNEE ARTHROSCOPY  12/17/2009/ AND 2008    RIGHT KNEE    HX KNEE REPLACEMENT Right 2013 2016    HX ORTHOPAEDIC  9/09 AND  5/24/2011    RIGHT TOTAL KNEE    HX ORTHOPAEDIC  1/31/2011   10/24/2011    REMOVED KNEE REPLACEMENT    HX ORTHOPAEDIC Left 09/05/2017    HAND    HX ROTATOR CUFF REPAIR Right 2008    HX SHOULDER ARTHROSCOPY  2003    RIGHT         Family History:   Problem Relation Age of Onset    Hypertension Mother     Diabetes Mother     Heart Disease Mother         MI    Heart Attack Mother     Heart Disease Father         MI    Heart Attack Father     Heart Surgery Sister         STENT, VALVE SURGERY    Hypertension Sister     Diabetes Sister     Heart Attack Brother     Hypertension Brother     Diabetes Sister     Hypertension Sister     Elevated Lipids Sister     Anesth Problems Neg Hx        Social History     Socioeconomic History    Marital status:      Spouse name: Not on file    Number of children: Not on file    Years of education: Not on file    Highest education level: Not on file   Social Needs    Financial resource strain: Not on file    Food insecurity - worry: Not on file    Food insecurity - inability: Not on file   adjust needs - medical: Not on file   adjust needs - non-medical: Not on file   Occupational History    Not on file   Tobacco Use    Smoking status: Never Smoker    Smokeless tobacco: Never Used   Substance and Sexual Activity    Alcohol use: No     Comment: RARE- WINE 1/YR    Drug use: No    Sexual activity: Not on file   Other Topics Concern    Not on file   Social History Narrative    Not on file         ALLERGIES: Patient has no known allergies. Review of Systems   Constitutional: Negative for chills and fever. HENT: Negative for congestion, rhinorrhea, sneezing and sore throat.     Eyes: Negative for redness and visual disturbance. Respiratory: Negative for shortness of breath. Cardiovascular: Positive for leg swelling. Negative for chest pain. Gastrointestinal: Negative for abdominal pain, blood in stool, diarrhea, nausea and vomiting. Genitourinary: Negative for difficulty urinating, dysuria, frequency and hematuria. Musculoskeletal: Positive for arthralgias and myalgias. Negative for back pain and neck stiffness. Skin: Positive for color change. Negative for rash. Neurological: Negative for dizziness, syncope, weakness and headaches. Hematological: Negative for adenopathy. Patient Vitals for the past 12 hrs:   Temp Pulse Resp BP SpO2   03/18/19 1336    140/62 100 %   03/18/19 1230     100 %   03/18/19 1031     100 %   03/18/19 0945 97.5 °F (36.4 °C) 88 16 149/69 100 %              Physical Exam   Constitutional: She is oriented to person, place, and time. She appears well-developed and well-nourished. No distress. HENT:   Head: Normocephalic and atraumatic. Right Ear: External ear normal.   Left Ear: External ear normal.   Eyes: EOM are normal. Pupils are equal, round, and reactive to light. Neck: Neck supple. Cardiovascular: Normal rate, regular rhythm, normal heart sounds and intact distal pulses. Exam reveals no gallop and no friction rub. No murmur heard. Pulmonary/Chest: Effort normal and breath sounds normal. No stridor. No respiratory distress. She has no wheezes. She has no rales. She exhibits no tenderness. Abdominal: Soft. Bowel sounds are normal. She exhibits no distension and no mass. There is no tenderness. There is no rebound and no guarding. Musculoskeletal: Normal range of motion. She exhibits edema. She exhibits no tenderness or deformity. Neurological: She is alert and oriented to person, place, and time. No cranial nerve deficit. Coordination normal.   Skin: Skin is warm and dry. Capillary refill takes less than 2 seconds.  Rash noted. There is erythema. No pallor. Right lower leg with swelling, erythema and ttp with focal central fluctuance. Warm to touch. Psychiatric: She has a normal mood and affect. Her behavior is normal.   Nursing note and vitals reviewed. MDM  Number of Diagnoses or Management Options  Abscess of right lower leg:   Cellulitis of right lower leg:      Amount and/or Complexity of Data Reviewed  Clinical lab tests: ordered and reviewed  Tests in the radiology section of CPT®: ordered and reviewed  Tests in the medicine section of CPT®: reviewed and ordered  Obtain history from someone other than the patient: yes ()  Review and summarize past medical records: yes  Independent visualization of images, tracings, or specimens: yes    Patient Progress  Patient progress: stable         Procedures    10:15 AM  Discussed pt, sx, hx and current findings with Ilya Thompson MD. He is in agreement with plan and will see pt. Will get US, labs, give rocephin and continue to monitor  Ambar Vo PA-C      12:30 PM   Updated pt on current findings. Will I&D abscess and change abx to doxy. Will get wound culture  Ambar Nguyễn. RAMESH Vo      Procedure Note - Incision and Drainage:   1:33 PM  Performed by: Ambar Nguyễn. RAMESH Vo  Complexity: complex   Skin prepped with surecleans. Sterile field established. Anesthesia achieved with 10 mLs of Bupivacaine 0.5% without epinephrine using a local infiltration. Abscess to leg: right was incised with # 11 blade, and 2 mLs of purulent bloody drainage was expressed. Wound probed and irrigated. Area was packed using 1/4 inch iodoform gauze. Sterile dressing applied. Estimated blood loss: less than 1mL  The procedure took 17 minutes, and pt tolerated well.     .1:35 PM  LABORATORY TESTS:  Recent Results (from the past 12 hour(s))   CBC WITH AUTOMATED DIFF    Collection Time: 03/18/19 10:25 AM   Result Value Ref Range    WBC 8.4 3.6 - 11.0 K/uL    RBC 3. 87 3.80 - 5.20 M/uL    HGB 11.1 (L) 11.5 - 16.0 g/dL    HCT 36.6 35.0 - 47.0 %    MCV 94.6 80.0 - 99.0 FL    MCH 28.7 26.0 - 34.0 PG    MCHC 30.3 30.0 - 36.5 g/dL    RDW 15.5 (H) 11.5 - 14.5 %    PLATELET 404 816 - 821 K/uL    MPV 10.5 8.9 - 12.9 FL    NRBC 0.0 0  WBC    ABSOLUTE NRBC 0.00 0.00 - 0.01 K/uL    NEUTROPHILS 71 32 - 75 %    LYMPHOCYTES 20 12 - 49 %    MONOCYTES 5 5 - 13 %    EOSINOPHILS 2 0 - 7 %    BASOPHILS 1 0 - 1 %    IMMATURE GRANULOCYTES 1 (H) 0.0 - 0.5 %    ABS. NEUTROPHILS 6.0 1.8 - 8.0 K/UL    ABS. LYMPHOCYTES 1.7 0.8 - 3.5 K/UL    ABS. MONOCYTES 0.4 0.0 - 1.0 K/UL    ABS. EOSINOPHILS 0.2 0.0 - 0.4 K/UL    ABS. BASOPHILS 0.1 0.0 - 0.1 K/UL    ABS. IMM. GRANS. 0.1 (H) 0.00 - 0.04 K/UL    DF AUTOMATED     METABOLIC PANEL, COMPREHENSIVE    Collection Time: 03/18/19 10:25 AM   Result Value Ref Range    Sodium 140 136 - 145 mmol/L    Potassium 3.4 (L) 3.5 - 5.1 mmol/L    Chloride 107 97 - 108 mmol/L    CO2 27 21 - 32 mmol/L    Anion gap 6 5 - 15 mmol/L    Glucose 104 (H) 65 - 100 mg/dL    BUN 24 (H) 6 - 20 MG/DL    Creatinine 1.04 (H) 0.55 - 1.02 MG/DL    BUN/Creatinine ratio 23 (H) 12 - 20      GFR est AA >60 >60 ml/min/1.73m2    GFR est non-AA 53 (L) >60 ml/min/1.73m2    Calcium 9.3 8.5 - 10.1 MG/DL    Bilirubin, total 0.3 0.2 - 1.0 MG/DL    ALT (SGPT) 29 12 - 78 U/L    AST (SGOT) 21 15 - 37 U/L    Alk. phosphatase 151 (H) 45 - 117 U/L    Protein, total 7.9 6.4 - 8.2 g/dL    Albumin 3.4 (L) 3.5 - 5.0 g/dL    Globulin 4.5 (H) 2.0 - 4.0 g/dL    A-G Ratio 0.8 (L) 1.1 - 2.2         IMAGING RESULTS:    Us Ext [phone] [facility]    Result Date: 3/18/2019  EXAM:  US EXT NONVAS RT LTD INDICATION: Right lower leg swelling after injury on 3/6/2019 COMPARISON: None. TECHNIQUE: Grayscale and color Doppler ultrasound was performed in the medial right lower extremity area of clinical concern.  FINDINGS: Ultrasound demonstrates diffuse subcutaneous soft tissue edema with an irregular anechoic to hypoechoic collection/multiple small collections with an overall measurement of 3.2 x 0.5 x 1.2 cm. Internal echogenicity is noted. IMPRESSION: Ultrasound finding of an irregular collection/multiple small collections in the subcutaneous soft tissues of the right lower extremity has an overall measurement of 3.2 cm. This may represent a hematoma or developing abscess, but there is no significant solitary drainable component. MEDICATIONS GIVEN:  Medications   bupivacaine (PF) (MARCAINE) 0.5 % (5 mg/mL) injection 50 mg (not administered)   cefTRIAXone (ROCEPHIN) 1 g in sterile water (preservative free) 10 mL IV syringe (1 g IntraVENous Given 3/18/19 1112)       IMPRESSION:  1. Abscess of right lower leg    2. Cellulitis of right lower leg        PLAN:  1. Discharge Medication List as of 3/18/2019  1:41 PM      START taking these medications    Details   doxycycline (VIBRA-TABS) 100 mg tablet Take 1 Tab by mouth two (2) times a day for 10 days. , Print, Disp-20 Tab, R-0      traMADol (ULTRAM) 50 mg tablet Take 1 Tab by mouth every six (6) hours as needed for Pain for up to 7 days. Max Daily Amount: 200 mg., Print, Disp-10 Tab, R-0         CONTINUE these medications which have NOT CHANGED    Details   aspirin delayed-release 81 mg tablet Take 81 mg by mouth daily. , Historical Med      folic acid (FOLVITE) 1 mg tablet Take 1 mg by mouth daily. , Historical Med      naproxen (NAPROSYN) 500 mg tablet Take 500 mg by mouth every eight (8) hours as needed., Historical Med      acetaminophen (TYLENOL) 325 mg tablet Take 325 mg by mouth every four (4) hours as needed for Pain., Historical Med      methotrexate (RHEUMATREX) 2.5 mg tablet Take 2.5 mg by mouth every Wednesday., Historical Med      cephALEXin (KEFLEX) 500 mg capsule Take 500 mg by mouth four (4) times daily. , Historical Med      AMLODIPINE BESYLATE (NORVASC PO) Take 10 mg by mouth daily. , Historical Med      levETIRAcetam (KEPPRA) 500 mg tablet Take 1,000 mg by mouth nightly., Historical Med      omeprazole (PRILOSEC) 20 mg capsule Take 20 mg by mouth daily. , Historical Med      atorvastatin (LIPITOR) 20 mg tablet Take 20 mg by mouth daily. , Historical Med      Cholecalciferol, Vitamin D3, (VITAMIN D) 1,000 unit Cap Take 2 Tabs by mouth daily (with dinner). , Historical Med      losartan-hydrochlorothiazide (HYZAAR) 100-25 mg per tablet Take 1 Tab by mouth daily. , Historical Med      potassium chloride SR (KLOR-CON 10) 10 mEq tablet Take 10-20 mEq by mouth daily. , Historical Med      warfarin (COUMADIN) 2 mg tablet Starting tomorrow Take 1.5 Tablet (3 mg) by mouth daily at 5 pm.  Take as directed, dose adjusted based on lab test.  Indications: DEEP VEIN THROMBOSIS PREVENTION, Print, Disp-90 Tab, R-0      diclofenac EC (VOLTAREN) 50 mg EC tablet Take 1 Tab by mouth daily as needed., Historical Med         STOP taking these medications       oxyCODONE IR (ROXICODONE) 5 mg immediate release tablet Comments:   Reason for Stoppin.   Follow-up Information     Follow up With Specialties Details Why Contact Info    Gera Rice MD Family Practice Schedule an appointment as soon as possible for a visit 2 days for recheck and packing removal [address] [facility] EMERGENCY DEPT Emergency Medicine Schedule an appointment as soon as possible for a visit 2 days for recheck and packing removal  [address] [place] [address]  [phone]        Return to ED if worse       1:37 PM  Pt has been reexamined. Pt has no new complaints, changes or physical findings. Care plan outlined and precautions discussed. All available results were reviewed with pt. All medications were reviewed with pt. All of pt's questions and concerns were addressed. Pt agrees to F/U as instructed and agrees to return to ED upon further deterioration. Pt is ready to go home.   MINGO Shaikh

## 2019-03-20 ENCOUNTER — HOSPITAL ENCOUNTER (EMERGENCY)
Age: 69
Discharge: HOME | DRG: 603 | End: 2019-03-20
Attending: EMERGENCY MEDICINE
Payer: MEDICARE

## 2019-03-20 VITALS
RESPIRATION RATE: 16 BRPM | BODY MASS INDEX: 30.22 KG/M2 | TEMPERATURE: 98 F | HEART RATE: 76 BPM | SYSTOLIC BLOOD PRESSURE: 143 MMHG | DIASTOLIC BLOOD PRESSURE: 78 MMHG | OXYGEN SATURATION: 99 % | WEIGHT: 188 LBS | HEIGHT: 66 IN

## 2019-03-20 DIAGNOSIS — L03.115 CELLULITIS OF RIGHT LOWER EXTREMITY: Primary | ICD-10-CM

## 2019-03-20 LAB
ANION GAP SERPL CALC-SCNC: 4 MMOL/L (ref 5–15)
BASOPHILS # BLD: 0.1 K/UL (ref 0–0.1)
BASOPHILS NFR BLD: 1 % (ref 0–1)
BUN SERPL-MCNC: 21 MG/DL (ref 6–20)
BUN/CREAT SERPL: 18 (ref 12–20)
CALCIUM SERPL-MCNC: 9.2 MG/DL (ref 8.5–10.1)
CHLORIDE SERPL-SCNC: 107 MMOL/L (ref 97–108)
CO2 SERPL-SCNC: 30 MMOL/L (ref 21–32)
CREAT SERPL-MCNC: 1.17 MG/DL (ref 0.55–1.02)
CRP SERPL-MCNC: 3.61 MG/DL (ref 0–0.6)
DIFFERENTIAL METHOD BLD: ABNORMAL
EOSINOPHIL # BLD: 0.2 K/UL (ref 0–0.4)
EOSINOPHIL NFR BLD: 2 % (ref 0–7)
ERYTHROCYTE [DISTWIDTH] IN BLOOD BY AUTOMATED COUNT: 15.3 % (ref 11.5–14.5)
ERYTHROCYTE [SEDIMENTATION RATE] IN BLOOD: 84 MM/HR (ref 0–30)
GLUCOSE SERPL-MCNC: 77 MG/DL (ref 65–100)
HCT VFR BLD AUTO: 34.5 % (ref 35–47)
HGB BLD-MCNC: 10.9 G/DL (ref 11.5–16)
IMM GRANULOCYTES # BLD AUTO: 0.1 K/UL (ref 0–0.04)
IMM GRANULOCYTES NFR BLD AUTO: 1 % (ref 0–0.5)
LYMPHOCYTES # BLD: 1.9 K/UL (ref 0.8–3.5)
LYMPHOCYTES NFR BLD: 27 % (ref 12–49)
MCH RBC QN AUTO: 28.9 PG (ref 26–34)
MCHC RBC AUTO-ENTMCNC: 31.6 G/DL (ref 30–36.5)
MCV RBC AUTO: 91.5 FL (ref 80–99)
MONOCYTES # BLD: 0.6 K/UL (ref 0–1)
MONOCYTES NFR BLD: 9 % (ref 5–13)
NEUTS SEG # BLD: 4.1 K/UL (ref 1.8–8)
NEUTS SEG NFR BLD: 60 % (ref 32–75)
NRBC # BLD: 0 K/UL (ref 0–0.01)
NRBC BLD-RTO: 0 PER 100 WBC
PLATELET # BLD AUTO: 439 K/UL (ref 150–400)
PMV BLD AUTO: 9.7 FL (ref 8.9–12.9)
POTASSIUM SERPL-SCNC: 4.1 MMOL/L (ref 3.5–5.1)
RBC # BLD AUTO: 3.77 M/UL (ref 3.8–5.2)
SODIUM SERPL-SCNC: 141 MMOL/L (ref 136–145)
WBC # BLD AUTO: 7 K/UL (ref 3.6–11)

## 2019-03-20 PROCEDURE — 36415 COLL VENOUS BLD VENIPUNCTURE: CPT

## 2019-03-20 PROCEDURE — 80048 BASIC METABOLIC PNL TOTAL CA: CPT

## 2019-03-20 PROCEDURE — 85025 COMPLETE CBC W/AUTO DIFF WBC: CPT

## 2019-03-20 PROCEDURE — 86140 C-REACTIVE PROTEIN: CPT

## 2019-03-20 PROCEDURE — 99282 EMERGENCY DEPT VISIT SF MDM: CPT

## 2019-03-20 PROCEDURE — 85652 RBC SED RATE AUTOMATED: CPT

## 2019-03-20 NOTE — ED NOTES
Dr. Sumanth Sykes has reviewed discharge instructions with patient and spouse including prescription(s) if applicable. Patient has received and verbalizes understanding of all instructions and has no further questions at this time. Patient exits ED via ambulatory accompanied by spouse. Patient in no acute distress.

## 2019-03-20 NOTE — DISCHARGE INSTRUCTIONS

## 2019-03-20 NOTE — ED PROVIDER NOTES
70 y.o. female with past medical history significant for hypercholesterolemia, HTN, GERD, stroke, arthritis, syncope, seizures, and prediabetes who presents from private vehicle with chief complaint of wound check. Pt states she was evaluated in the ED on Monday for a right lower extremity wound. Pt reports she is here today for a wound dressing change. There are no other acute medical concerns at this time. PCP: Lula Ceron MD 
 
Note written by Ruthie Marie, as dictated by Raul Hawkins MD 1:01 PM 
 
. Past Medical History:  
Diagnosis Date  Arthritis  GERD (gastroesophageal reflux disease)  Hypercholesterolemia  Hypertension  Other ill-defined conditions(799.89) MGUS (MONOCLONAL GAMMOPATHY OF UNCLEAR SIGNIFICANCE)  Prediabetes  Seizures ([facility] [address] 2016 PER NEUROLOGIST ABSENCE SEIZURES, patient states  Stroke [facility]) 2006 TIA  Syncope and collapse   
 loop recorder placed 4/2016 (syncope of unknown etiology) Past Surgical History:  
Procedure Laterality Date  HX APPENDECTOMY    
 REMOVED DURING HYSTERECTOMY  HX COLONOSCOPY    
 HX DILATION AND CURETTAGE    
 HX HYSTERECTOMY  1996  HX IMPLANTABLE LOOP RECORDER  04/29/2016  HX KNEE ARTHROSCOPY  12/17/2009/ AND 2008 RIGHT KNEE  
 HX KNEE REPLACEMENT Right 2013 2016  HX ORTHOPAEDIC  9/09 AND  5/24/2011 RIGHT TOTAL KNEE  
 HX ORTHOPAEDIC  1/31/2011   10/24/2011 REMOVED KNEE REPLACEMENT  
 HX ORTHOPAEDIC Left 09/05/2017 HAND  HX ROTATOR CUFF REPAIR Right 2008  HX SHOULDER ARTHROSCOPY  2003 RIGHT Family History:  
Problem Relation Age of Onset  Hypertension Mother  Diabetes Mother  Heart Disease Mother MI  
 Heart Attack Mother  Heart Disease Father MI  
 Heart Attack Father  Heart Surgery Sister STENT, VALVE SURGERY  
 Hypertension Sister  Diabetes Sister  Heart Attack Brother  Hypertension Brother  Diabetes Sister  Hypertension Sister  Elevated Lipids Sister  Anesth Problems Neg Hx Social History Socioeconomic History  Marital status:  Spouse name: Not on file  Number of children: Not on file  Years of education: Not on file  Highest education level: Not on file Occupational History  Not on file Social Needs  Financial resource strain: Not on file  Food insecurity:  
  Worry: Not on file Inability: Not on file  Transportation needs:  
  Medical: Not on file Non-medical: Not on file Tobacco Use  Smoking status: Never Smoker  Smokeless tobacco: Never Used Substance and Sexual Activity  Alcohol use: No  
  Comment: RARE- WINE 1/YR  
 Drug use: No  
 Sexual activity: Not on file Lifestyle  Physical activity:  
  Days per week: Not on file Minutes per session: Not on file  Stress: Not on file Relationships  Social connections:  
  Talks on phone: Not on file Gets together: Not on file Attends Jain service: Not on file Active member of club or organization: Not on file Attends meetings of clubs or organizations: Not on file Relationship status: Not on file  Intimate partner violence:  
  Fear of current or ex partner: Not on file Emotionally abused: Not on file Physically abused: Not on file Forced sexual activity: Not on file Other Topics Concern  Not on file Social History Narrative  Not on file ALLERGIES: Patient has no known allergies. Review of Systems Constitutional: Negative for chills and fever. HENT: Negative for ear pain, sinus pain and sore throat. Eyes: Negative for photophobia and pain. Respiratory: Negative for chest tightness and shortness of breath. Cardiovascular: Negative for chest pain and leg swelling. Gastrointestinal: Negative for abdominal pain, diarrhea, nausea and vomiting. Genitourinary: Negative for dysuria and flank pain. Musculoskeletal: Negative for back pain and neck pain. Skin: Positive for wound. Negative for rash. Neurological: Negative for speech difficulty and headaches. Psychiatric/Behavioral: Negative for confusion, self-injury and suicidal ideas. There were no vitals filed for this visit. Physical Exam  
Constitutional: She appears well-developed and well-nourished. HENT:  
Head: Normocephalic and atraumatic. Pulmonary/Chest: Effort normal. No respiratory distress. Musculoskeletal:  
Old scar on right knee from past TKR Skin: There is erythema. Draining wound right lower leg, surrounding erythema unchanged from previous visit Nursing note and vitals reviewed. MDM Number of Diagnoses or Management Options Cellulitis of right lower extremity:  
Diagnosis management comments: Right lower leg cellulitis on doxycycline, refer to outpatient wound care clinics for continued care. Amount and/or Complexity of Data Reviewed Clinical lab tests: ordered and reviewed Discuss the patient with other providers: yes (Tiger text to Carina Willis and he understood myplan to refer patient to outpatient wound care and seemed to be in agreement.) Procedures VITALS:  
Patient Vitals for the past 8 hrs: 
 Temp Pulse Resp BP SpO2  
03/20/19 1303 98 °F (36.7 °C) 76 16 143/78 99 % Recent Results (from the past 24 hour(s)) CBC WITH AUTOMATED DIFF Collection Time: 03/20/19  1:34 PM  
Result Value Ref Range WBC 7.0 3.6 - 11.0 K/uL  
 RBC 3.77 (L) 3.80 - 5.20 M/uL  
 HGB 10.9 (L) 11.5 - 16.0 g/dL HCT 34.5 (L) 35.0 - 47.0 % MCV 91.5 80.0 - 99.0 FL  
 MCH 28.9 26.0 - 34.0 PG  
 MCHC 31.6 30.0 - 36.5 g/dL  
 RDW 15.3 (H) 11.5 - 14.5 % PLATELET 179 (H) 399 - 400 K/uL MPV 9.7 8.9 - 12.9 FL  
 NRBC 0.0 0  WBC ABSOLUTE NRBC 0.00 0.00 - 0.01 K/uL NEUTROPHILS 60 32 - 75 % LYMPHOCYTES 27 12 - 49 % MONOCYTES 9 5 - 13 % EOSINOPHILS 2 0 - 7 % BASOPHILS 1 0 - 1 % IMMATURE GRANULOCYTES 1 (H) 0.0 - 0.5 % ABS. NEUTROPHILS 4.1 1.8 - 8.0 K/UL  
 ABS. LYMPHOCYTES 1.9 0.8 - 3.5 K/UL  
 ABS. MONOCYTES 0.6 0.0 - 1.0 K/UL  
 ABS. EOSINOPHILS 0.2 0.0 - 0.4 K/UL  
 ABS. BASOPHILS 0.1 0.0 - 0.1 K/UL  
 ABS. IMM. GRANS. 0.1 (H) 0.00 - 0.04 K/UL  
 DF AUTOMATED    
C REACTIVE PROTEIN, QT Collection Time: 03/20/19  1:34 PM  
Result Value Ref Range C-Reactive protein 3.61 (H) 0.00 - 0.60 mg/dL SED RATE (ESR) Collection Time: 03/20/19  1:34 PM  
Result Value Ref Range Sed rate, automated 84 (H) 0 - 30 mm/hr METABOLIC PANEL, BASIC Collection Time: 03/20/19  1:34 PM  
Result Value Ref Range Sodium 141 136 - 145 mmol/L Potassium 4.1 3.5 - 5.1 mmol/L Chloride 107 97 - 108 mmol/L  
 CO2 30 21 - 32 mmol/L Anion gap 4 (L) 5 - 15 mmol/L Glucose 77 65 - 100 mg/dL BUN 21 (H) 6 - 20 MG/DL Creatinine 1.17 (H) 0.55 - 1.02 MG/DL  
 BUN/Creatinine ratio 18 12 - 20 GFR est AA 56 (L) >60 ml/min/1.73m2 GFR est non-AA 46 (L) >60 ml/min/1.73m2  Calcium 9.2 8.5 - 10.1 MG/DL

## 2019-03-22 ENCOUNTER — HOSPITAL ENCOUNTER (INPATIENT)
Age: 69
LOS: 1 days | Discharge: HOME | DRG: 603 | End: 2019-03-23
Attending: STUDENT IN AN ORGANIZED HEALTH CARE EDUCATION/TRAINING PROGRAM | Admitting: INTERNAL MEDICINE
Payer: MEDICARE

## 2019-03-22 ENCOUNTER — APPOINTMENT (OUTPATIENT)
Dept: VASCULAR SURGERY | Age: 69
DRG: 603 | End: 2019-03-22
Attending: INTERNAL MEDICINE
Payer: MEDICARE

## 2019-03-22 ENCOUNTER — APPOINTMENT (OUTPATIENT)
Dept: GENERAL RADIOLOGY | Age: 69
DRG: 603 | End: 2019-03-22
Attending: PHYSICIAN ASSISTANT
Payer: MEDICARE

## 2019-03-22 DIAGNOSIS — L03.115 CELLULITIS OF RIGHT LOWER EXTREMITY: Primary | ICD-10-CM

## 2019-03-22 LAB
ALBUMIN SERPL-MCNC: 3.2 G/DL (ref 3.5–5)
ALBUMIN/GLOB SERPL: 0.7 {RATIO} (ref 1.1–2.2)
ALP SERPL-CCNC: 137 U/L (ref 45–117)
ALT SERPL-CCNC: 21 U/L (ref 12–78)
ANION GAP SERPL CALC-SCNC: 8 MMOL/L (ref 5–15)
AST SERPL-CCNC: 21 U/L (ref 15–37)
BACTERIA SPEC CULT: NORMAL
BASOPHILS # BLD: 0 K/UL (ref 0–0.1)
BASOPHILS NFR BLD: 0 % (ref 0–1)
BILIRUB SERPL-MCNC: 0.2 MG/DL (ref 0.2–1)
BUN SERPL-MCNC: 21 MG/DL (ref 6–20)
BUN/CREAT SERPL: 21 (ref 12–20)
CALCIUM SERPL-MCNC: 9.2 MG/DL (ref 8.5–10.1)
CHLORIDE SERPL-SCNC: 108 MMOL/L (ref 97–108)
CO2 SERPL-SCNC: 26 MMOL/L (ref 21–32)
CREAT SERPL-MCNC: 1.02 MG/DL (ref 0.55–1.02)
DIFFERENTIAL METHOD BLD: ABNORMAL
EOSINOPHIL # BLD: 0 K/UL (ref 0–0.4)
EOSINOPHIL NFR BLD: 0 % (ref 0–7)
ERYTHROCYTE [DISTWIDTH] IN BLOOD BY AUTOMATED COUNT: 15.1 % (ref 11.5–14.5)
GLOBULIN SER CALC-MCNC: 4.3 G/DL (ref 2–4)
GLUCOSE SERPL-MCNC: 115 MG/DL (ref 65–100)
GRAM STN SPEC: NORMAL
GRAM STN SPEC: NORMAL
HCT VFR BLD AUTO: 37.5 % (ref 35–47)
HGB BLD-MCNC: 11 G/DL (ref 11.5–16)
IMM GRANULOCYTES # BLD AUTO: 0 K/UL
IMM GRANULOCYTES NFR BLD AUTO: 0 %
LYMPHOCYTES # BLD: 1.4 K/UL (ref 0.8–3.5)
LYMPHOCYTES NFR BLD: 22 % (ref 12–49)
MCH RBC QN AUTO: 28.3 PG (ref 26–34)
MCHC RBC AUTO-ENTMCNC: 29.3 G/DL (ref 30–36.5)
MCV RBC AUTO: 96.4 FL (ref 80–99)
MONOCYTES # BLD: 0.5 K/UL (ref 0–1)
MONOCYTES NFR BLD: 7 % (ref 5–13)
NEUTS SEG # BLD: 4.6 K/UL (ref 1.8–8)
NEUTS SEG NFR BLD: 71 % (ref 32–75)
NRBC # BLD: 0 K/UL (ref 0–0.01)
NRBC BLD-RTO: 0 PER 100 WBC
PLATELET # BLD AUTO: 382 K/UL (ref 150–400)
PMV BLD AUTO: 9.6 FL (ref 8.9–12.9)
POTASSIUM SERPL-SCNC: 3.1 MMOL/L (ref 3.5–5.1)
PROT SERPL-MCNC: 7.5 G/DL (ref 6.4–8.2)
RBC # BLD AUTO: 3.89 M/UL (ref 3.8–5.2)
RBC MORPH BLD: ABNORMAL
SERVICE CMNT-IMP: NORMAL
SODIUM SERPL-SCNC: 142 MMOL/L (ref 136–145)
WBC # BLD AUTO: 6.5 K/UL (ref 3.6–11)
WBC MORPH BLD: ABNORMAL

## 2019-03-22 PROCEDURE — 65270000029 HC RM PRIVATE

## 2019-03-22 PROCEDURE — 99284 EMERGENCY DEPT VISIT MOD MDM: CPT

## 2019-03-22 PROCEDURE — 80053 COMPREHEN METABOLIC PANEL: CPT

## 2019-03-22 PROCEDURE — 74011250637 HC RX REV CODE- 250/637: Performed by: INTERNAL MEDICINE

## 2019-03-22 PROCEDURE — 85025 COMPLETE CBC W/AUTO DIFF WBC: CPT

## 2019-03-22 PROCEDURE — 87205 SMEAR GRAM STAIN: CPT

## 2019-03-22 PROCEDURE — 93971 EXTREMITY STUDY: CPT

## 2019-03-22 PROCEDURE — 74011000258 HC RX REV CODE- 258: Performed by: INTERNAL MEDICINE

## 2019-03-22 PROCEDURE — 73590 X-RAY EXAM OF LOWER LEG: CPT

## 2019-03-22 PROCEDURE — 74011250636 HC RX REV CODE- 250/636: Performed by: INTERNAL MEDICINE

## 2019-03-22 PROCEDURE — 36415 COLL VENOUS BLD VENIPUNCTURE: CPT

## 2019-03-22 RX ORDER — LOSARTAN POTASSIUM 50 MG/1
100 TABLET ORAL DAILY
Status: DISCONTINUED | OUTPATIENT
Start: 2019-03-23 | End: 2019-03-23 | Stop reason: HOSPADM

## 2019-03-22 RX ORDER — AMLODIPINE BESYLATE 10 MG/1
10 TABLET ORAL DAILY
COMMUNITY
End: 2019-03-23

## 2019-03-22 RX ORDER — LOSARTAN POTASSIUM 50 MG/1
100 TABLET ORAL DAILY
Status: DISCONTINUED | OUTPATIENT
Start: 2019-03-23 | End: 2019-03-22

## 2019-03-22 RX ORDER — GABAPENTIN 300 MG/1
300 CAPSULE ORAL
Status: DISCONTINUED | OUTPATIENT
Start: 2019-03-22 | End: 2019-03-23 | Stop reason: HOSPADM

## 2019-03-22 RX ORDER — SODIUM CHLORIDE 0.9 % (FLUSH) 0.9 %
5-40 SYRINGE (ML) INJECTION AS NEEDED
Status: DISCONTINUED | OUTPATIENT
Start: 2019-03-22 | End: 2019-03-23 | Stop reason: HOSPADM

## 2019-03-22 RX ORDER — LEVETIRACETAM 500 MG/1
1000 TABLET, EXTENDED RELEASE ORAL
COMMUNITY

## 2019-03-22 RX ORDER — OXYCODONE AND ACETAMINOPHEN 5; 325 MG/1; MG/1
1 TABLET ORAL
Status: DISCONTINUED | OUTPATIENT
Start: 2019-03-22 | End: 2019-03-22 | Stop reason: SDUPTHER

## 2019-03-22 RX ORDER — AMLODIPINE BESYLATE 5 MG/1
10 TABLET ORAL DAILY
Status: DISCONTINUED | OUTPATIENT
Start: 2019-03-23 | End: 2019-03-23

## 2019-03-22 RX ORDER — PANTOPRAZOLE SODIUM 40 MG/1
40 TABLET, DELAYED RELEASE ORAL
Status: DISCONTINUED | OUTPATIENT
Start: 2019-03-23 | End: 2019-03-23 | Stop reason: HOSPADM

## 2019-03-22 RX ORDER — OXYCODONE HYDROCHLORIDE 5 MG/1
5 TABLET ORAL
Status: DISCONTINUED | OUTPATIENT
Start: 2019-03-22 | End: 2019-03-23 | Stop reason: HOSPADM

## 2019-03-22 RX ORDER — MORPHINE SULFATE 4 MG/ML
2 INJECTION INTRAVENOUS
Status: DISCONTINUED | OUTPATIENT
Start: 2019-03-22 | End: 2019-03-23 | Stop reason: HOSPADM

## 2019-03-22 RX ORDER — SODIUM CHLORIDE 0.9 % (FLUSH) 0.9 %
5-40 SYRINGE (ML) INJECTION EVERY 8 HOURS
Status: DISCONTINUED | OUTPATIENT
Start: 2019-03-22 | End: 2019-03-23 | Stop reason: HOSPADM

## 2019-03-22 RX ORDER — ASPIRIN 81 MG/1
81 TABLET ORAL DAILY
Status: DISCONTINUED | OUTPATIENT
Start: 2019-03-23 | End: 2019-03-23 | Stop reason: HOSPADM

## 2019-03-22 RX ORDER — GABAPENTIN 300 MG/1
300 CAPSULE ORAL
COMMUNITY
End: 2022-06-14

## 2019-03-22 RX ORDER — LIDOCAINE HYDROCHLORIDE 10 MG/ML
5 INJECTION, SOLUTION EPIDURAL; INFILTRATION; INTRACAUDAL; PERINEURAL ONCE
Status: DISCONTINUED | OUTPATIENT
Start: 2019-03-22 | End: 2019-03-22

## 2019-03-22 RX ORDER — LEVETIRACETAM 500 MG/1
1000 TABLET, EXTENDED RELEASE ORAL
Status: DISCONTINUED | OUTPATIENT
Start: 2019-03-22 | End: 2019-03-23 | Stop reason: HOSPADM

## 2019-03-22 RX ORDER — FOLIC ACID 1 MG/1
1 TABLET ORAL DAILY
Status: DISCONTINUED | OUTPATIENT
Start: 2019-03-23 | End: 2019-03-23 | Stop reason: HOSPADM

## 2019-03-22 RX ORDER — HYDROCHLOROTHIAZIDE 25 MG/1
25 TABLET ORAL DAILY
Status: DISCONTINUED | OUTPATIENT
Start: 2019-03-23 | End: 2019-03-23 | Stop reason: HOSPADM

## 2019-03-22 RX ORDER — ACETAMINOPHEN 325 MG/1
650 TABLET ORAL
Status: DISCONTINUED | OUTPATIENT
Start: 2019-03-22 | End: 2019-03-23 | Stop reason: HOSPADM

## 2019-03-22 RX ORDER — ENOXAPARIN SODIUM 100 MG/ML
40 INJECTION SUBCUTANEOUS EVERY 24 HOURS
Status: DISCONTINUED | OUTPATIENT
Start: 2019-03-22 | End: 2019-03-23 | Stop reason: HOSPADM

## 2019-03-22 RX ORDER — POTASSIUM CHLORIDE 750 MG/1
40 TABLET, FILM COATED, EXTENDED RELEASE ORAL EVERY 4 HOURS
Status: COMPLETED | OUTPATIENT
Start: 2019-03-22 | End: 2019-03-22

## 2019-03-22 RX ADMIN — GABAPENTIN 300 MG: 300 CAPSULE ORAL at 21:25

## 2019-03-22 RX ADMIN — ENOXAPARIN SODIUM 40 MG: 40 INJECTION SUBCUTANEOUS at 21:25

## 2019-03-22 RX ADMIN — LEVETIRACETAM 1000 MG: 500 TABLET, EXTENDED RELEASE ORAL at 21:27

## 2019-03-22 RX ADMIN — VANCOMYCIN HYDROCHLORIDE 1750 MG: 10 INJECTION, POWDER, LYOPHILIZED, FOR SOLUTION INTRAVENOUS at 18:37

## 2019-03-22 RX ADMIN — POTASSIUM CHLORIDE 40 MEQ: 750 TABLET, EXTENDED RELEASE ORAL at 17:48

## 2019-03-22 RX ADMIN — CEFEPIME 2 G: 2 INJECTION, POWDER, FOR SOLUTION INTRAVENOUS at 17:47

## 2019-03-22 RX ADMIN — Medication 10 ML: at 21:25

## 2019-03-22 RX ADMIN — POTASSIUM CHLORIDE 40 MEQ: 750 TABLET, EXTENDED RELEASE ORAL at 21:25

## 2019-03-22 NOTE — H&P
Admission History and Physical 
 
 
NAME:  Horacio Snowden :   1950 MRN:  [id number] PCP:  Leila Henning MD  
 
Date/Time:  3/22/2019 Assessment/Plan:   
  
Cellulitis of leg, right (3/22/2019):  Not improving despite cephalexin and doxy and post I&D. Wound cultures from 3/18 w/o growth. No obvious risk factors for resistant organisms other than ED visits and I&D. -- agree with vanc 
-- add cefepime -- check LE doppler -- check Xray tib/fib 
-- consider CT LE if not improving with IV abx 
-- oxycodone prn for moderate pain and IV morphine for severe pain -- wound care consult 
-- check mrsa screen Hypokalemia: 
-- replete with PO Hypertension (): 
-- continue amlodipine, losartan, hctz Hypercholesterolemia (): 
-- hold atorvastatin GERD (gastroesophageal reflux disease) (): 
-- continue PPI [facility] Overview: PER NEUROLOGIST ABSENCE SEIZURES, patient states. -- continue Keppra Subjective: CHIEF COMPLAINT:  Right leg swelling and redness. HISTORY OF PRESENT ILLNESS:    
Ms. De Clements is a 70 y.o.  female who is admitted with Cellulitis of leg, right. Ms. De Clements presented to the Emergency Department today complaining of persistent redness and swelling of right leg. Had initial injury after a fall and bumping right lower leg. However, did not have initial wound or abrasion. 2 days after injury noted swelling and redness. No fever, chills. Seen in ED on 3/10, 3/18, and 3/20. Was started on cephalexin originally and changed to doxycycline due to lack of improvement. Had I&D on 3/18. Had single bout of vomiting and diarrhea two days ago, but none since. Past Medical History:  
Diagnosis Date  Arthritis  GERD (gastroesophageal reflux disease)  Hypercholesterolemia  Hypertension  Ill-defined condition   
 cellulitis  Other ill-defined conditions(666.88) MGUS (MONOCLONAL GAMMOPATHY OF UNCLEAR SIGNIFICANCE)  Prediabetes  Seizures ([facility] [address] 2016 PER NEUROLOGIST ABSENCE SEIZURES, patient states  Stroke [facility]) 2006 TIA  Syncope and collapse   
 loop recorder placed 4/2016 (syncope of unknown etiology) Past Surgical History:  
Procedure Laterality Date  HX APPENDECTOMY    
 REMOVED DURING HYSTERECTOMY  HX COLONOSCOPY    
 HX DILATION AND CURETTAGE    
 HX HYSTERECTOMY  1996  HX IMPLANTABLE LOOP RECORDER  04/29/2016  HX KNEE ARTHROSCOPY  12/17/2009/ AND 2008 RIGHT KNEE  
 HX KNEE REPLACEMENT Right 2013 2016  HX ORTHOPAEDIC  9/09 AND  5/24/2011 RIGHT TOTAL KNEE, L-knee replacement  HX ORTHOPAEDIC  1/31/2011   10/24/2011 REMOVED KNEE REPLACEMENT  
 HX ORTHOPAEDIC Left 09/05/2017 HAND  HX ROTATOR CUFF REPAIR Right 2008  HX SHOULDER ARTHROSCOPY  2003 RIGHT Social History Tobacco Use  Smoking status: Never Smoker  Smokeless tobacco: Never Used Substance Use Topics  Alcohol use: No  
  Comment: RARE- WINE 1/YR Family History Problem Relation Age of Onset  Hypertension Mother  Diabetes Mother  Heart Disease Mother MI  
 Heart Attack Mother  Heart Disease Father MI  
 Heart Attack Father  Heart Surgery Sister STENT, VALVE SURGERY  
 Hypertension Sister  Diabetes Sister  Heart Attack Brother  Hypertension Brother  Diabetes Sister  Hypertension Sister  Elevated Lipids Sister  Anesth Problems Neg Hx No Known Allergies Prior to Admission medications Medication Sig Start Date End Date Taking? Authorizing Provider  
aspirin delayed-release 81 mg tablet Take 81 mg by mouth daily. Brooks Solis MD  
folic acid (FOLVITE) 1 mg tablet Take 1 mg by mouth daily. Brooks Solis MD  
naproxen (NAPROSYN) 500 mg tablet Take 500 mg by mouth every eight (8) hours as needed.     Brooks Solis MD  
 acetaminophen (TYLENOL) 325 mg tablet Take 325 mg by mouth every four (4) hours as needed for Pain. Brooks Solis MD  
methotrexate (RHEUMATREX) 2.5 mg tablet Take 2.5 mg by mouth every Wednesday. Brooks Solis MD  
cephALEXin (KEFLEX) 500 mg capsule Take 500 mg by mouth four (4) times daily. Brooks Solis MD  
doxycycline (VIBRA-TABS) 100 mg tablet Take 1 Tab by mouth two (2) times a day for 10 days. 3/18/19 3/28/19  Elisha Vo PA  
traMADol (ULTRAM) 50 mg tablet Take 1 Tab by mouth every six (6) hours as needed for Pain for up to 7 days. Max Daily Amount: 200 mg. 3/18/19 3/25/19  MINGO Esteban  
warfarin (COUMADIN) 2 mg tablet Starting tomorrow Take 1.5 Tablet (3 mg) by mouth daily at 5 pm.  Take as directed, dose adjusted based on lab test.  Indications: DEEP VEIN THROMBOSIS PREVENTION 11/3/17   Juana Beckman NP  
diclofenac EC (VOLTAREN) 50 mg EC tablet Take 1 Tab by mouth daily as needed. 3/14/17   Provider, Historical  
AMLODIPINE BESYLATE (NORVASC PO) Take 10 mg by mouth daily. Provider, Historical  
levETIRAcetam (KEPPRA) 500 mg tablet Take 1,000 mg by mouth nightly. Provider, Historical  
omeprazole (PRILOSEC) 20 mg capsule Take 20 mg by mouth daily. Provider, Historical  
atorvastatin (LIPITOR) 20 mg tablet Take 20 mg by mouth daily. Provider, Historical  
Cholecalciferol, Vitamin D3, (VITAMIN D) 1,000 unit Cap Take 2 Tabs by mouth daily (with dinner). Provider, Historical  
losartan-hydrochlorothiazide (HYZAAR) 100-25 mg per tablet Take 1 Tab by mouth daily. Provider, Historical  
potassium chloride SR (KLOR-CON 10) 10 mEq tablet Take 10-20 mEq by mouth daily. Provider, Historical  
 
 
 
Review of Systems: 
(bold if positive, if negative) Gen:  Eyes:  ENT:  CVS:  edemaPulm:  GI:   
:   
MS:  PainSkin:  woundEndo:   
Hem:  Renal:   
Neuro:    
 
 
  
Objective: VITALS:   
Vital signs reviewed; most recent are: 
 
Visit Vitals /89 (BP 1 Location: Right arm, BP Patient Position: At rest) Pulse 85 Temp 97.9 °F (36.6 °C) Resp 16 Ht 5' 6\" (1.676 m) Wt 85.3 kg (188 lb) SpO2 99% BMI 30.34 kg/m² SpO2 Readings from Last 6 Encounters:  
03/22/19 99% 03/20/19 99% 03/18/19 100% 03/10/19 100% 11/02/17 96% 10/16/17 100% No intake or output data in the 24 hours ending 03/22/19 1545 Exam:  
 
Physical Exam: 
 
Gen:  Well-developed, well-nourished, in no acute distress HEENT:  Pink conjunctivae, PERRL, hearing intact to voice, moist mucous membranes Resp:  No accessory muscle use, clear breath sounds without wheezes rales or rhonchi 
Card:  No murmurs, normal S1, S2 without thrills, 1+ peripheral edema RLE Abd:  Soft, non-tender, non-distended, normoactive bowel sounds are present Musc:  No cyanosis, right calf ttp Skin:  Erythema and edema above right ankle, medial wound from prior I&D w/o drainage Neuro:  Cranial nerves 3-12 are grossly intact,  strength is 5/5 bilaterally, dorsi / plantarflexion strength is 5/5 bilaterally, follows commands appropriately Psych:  Alert with good insight. Oriented to person, place, and time Labs: 
 
Recent Labs  
  03/22/19 
1407 WBC 6.5 HGB 11.0*  
HCT 37.5  Recent Labs  
  03/22/19 
1407   
K 3.1*  
 CO2 26 * BUN 21* CREA 1.02  
CA 9.2 ALB 3.2* TBILI 0.2 SGOT 21 ALT 21 Lab Results Component Value Date/Time Glucose (POC) 81 11/02/2017 11:43 AM  
 Glucose (POC) 107 (H) 11/02/2017 07:10 AM  
 
No results for input(s): PH, PCO2, PO2, HCO3, FIO2 in the last 72 hours. No results for input(s): INR in the last 72 hours. No lab exists for component: INREXT Medical records reviewed in preparation for this admission: Old medical records. Surrogate decision maker:  spouse Total time spent in care of this patient: 50 Minutes Care Plan discussed with: Patient and Family Discussed:  Care Plan Prophylaxis:  Lovenox Probable Disposition:  Home w/Family 
        
___________________________________________________ Attending Physician: Padmaja Urias MD

## 2019-03-22 NOTE — PROGRESS NOTES
[address] Dosing Services: Antimicrobial Stewardship Progress Note Consult for antibiotic dosing of vancomycin/cefepime by Dr. Hurt [facility] Pharmacist reviewed antibiotic appropriateness for 70year old , female  for indication of cellulitis Day of Therapy 1 Plan: 
Vancomycin therapy: 
Start Vancomycin therapy, with loading dose of 1750 mg. Follow with maintenance dose of 1250 mg every 12 hours. Dose calculated to approximate a therapeutic trough of 10-15mcg/mL. Last trough level / Plan for level: Trough prior to 4th dose. Pharmacy to follow daily and will make changes to dose and/or frequency based on clinical status. Non-Kinetic Antimicrobial Dosing:  
Recommendation: Cefepime 2g IV every 12 hours for CrCl 30-60mL/min Other Antimicrobial 
(not dosed by pharmacist) 
 none Cultures 
 
 none Serum Creatinine Lab Results Component Value Date/Time Creatinine 1.02 03/22/2019 02:07 PM  
 Creatinine (POC) 1.1 02/17/2014 09:38 PM  
 
   
Creatinine Clearance Estimated Creatinine Clearance: 57.3 mL/min (based on SCr of 1.02 mg/dL). Temp 97.9 °F (36.6 °C) WBC Lab Results Component Value Date/Time WBC 6.5 03/22/2019 02:07 PM  
 
   
H/H Lab Results Component Value Date/Time HGB 11.0 (L) 03/22/2019 02:07 PM  
 
  
 
Platelets Lab Results Component Value Date/Time PLATELET 851 62/81/3178 02:07 PM  
 
  
 
 
Pharmacist: Signed Al Xiao Contact information: [phone]

## 2019-03-22 NOTE — PROGRESS NOTES
[facility]: MED RECONCILIATION Comments/Recommendations: ? Med rec performed via interview with patient who was a good historian. ? Patient states she was on keflex 500 mg QID 3/15-3/18 and was then changed to doxycycline 100 mg po BID 3/18 x 10 days for her SSTI. Last dose of doxycycline was this morning. Medications added: · Gabapentin Medications removed: · APAP 
· Keflex--took for 3 days then changed to doxycycline · Diclofenac · Naproxen --patient not taking due to trying to avoid medications that affect her kidneys · Warfarin--patient states she was on this several years ago post knee surgery for DVT PPX Medications adjusted: 
? Changed keppra from IR to 500 mg ER 2 tablets QHS ? Changed potassium to 1 tablet BID Information obtained from: patient, rx query Significant PMH/Disease States:  
Past Medical History:  
Diagnosis Date  Arthritis  GERD (gastroesophageal reflux disease)  Hypercholesterolemia  Hypertension  Ill-defined condition   
 cellulitis  Other ill-defined conditions(799.89) MGUS (MONOCLONAL GAMMOPATHY OF UNCLEAR SIGNIFICANCE)  Prediabetes  Seizures ([facility] [address] 2016 PER NEUROLOGIST ABSENCE SEIZURES, patient states  Stroke [facility]) 2006 TIA  Syncope and collapse   
 loop recorder placed 4/2016 (syncope of unknown etiology) Chief Complaint for this Admission: Chief Complaint Patient presents with  
 Skin Infection  Leg Swelling Allergies: Patient has no known allergies. Prior to Admission Medications:  
 
Prior to Admission Medications Prescriptions Last Dose Informant Patient Reported? Taking? Cholecalciferol, Vitamin D3, (VITAMIN D) 1,000 unit Cap 3/22/2019 at AM Self Yes Yes Sig: Take 2 Tabs by mouth daily. amLODIPine (NORVASC) 10 mg tablet 3/22/2019 at AM Self Yes Yes Sig: Take 10 mg by mouth daily. aspirin delayed-release 81 mg tablet 3/22/2019 at AM Self Yes Yes Sig: Take 81 mg by mouth daily. atorvastatin (LIPITOR) 20 mg tablet 3/22/2019 at AM Self Yes Yes Sig: Take 20 mg by mouth daily. doxycycline (VIBRA-TABS) 100 mg tablet 3/22/2019 at AM Self No Yes Sig: Take 1 Tab by mouth two (2) times a day for 10 days. folic acid (FOLVITE) 1 mg tablet 3/22/2019 at AM Self Yes Yes Sig: Take 1 mg by mouth daily. gabapentin (NEURONTIN) 300 mg capsule 3/21/2019 at PM Self Yes Yes Sig: Take 300 mg by mouth nightly. levETIRAcetam (KEPPRA XR) 500 mg ER tablet 3/21/2019 at PM Self Yes Yes Sig: Take 1,000 mg by mouth nightly. losartan-hydrochlorothiazide (HYZAAR) 100-25 mg per tablet 3/22/2019 at AM Self Yes Yes Sig: Take 1 Tab by mouth daily. methotrexate (RHEUMATREX) 2.5 mg tablet 3/20/2019 Self Yes Yes Sig: Take 20 mg by mouth every Wednesday. omeprazole (PRILOSEC) 20 mg capsule 3/22/2019 at AM Self Yes Yes Sig: Take 20 mg by mouth Daily (before breakfast). potassium chloride SR (KLOR-CON 10) 10 mEq tablet 3/22/2019 at AM Self Yes Yes Sig: Take 10 mEq by mouth two (2) times a day. traMADol (ULTRAM) 50 mg tablet  Self No Yes Sig: Take 1 Tab by mouth every six (6) hours as needed for Pain for up to 7 days. Max Daily Amount: 200 mg. Facility-Administered Medications: None Art Durán PHARMD   Contact: [phone]

## 2019-03-22 NOTE — ED PROVIDER NOTES
1:35 PM 
I have evaluated the patient as the Provider in Triage. I have reviewed Her vital signs and the triage nurse assessment. I have talked with the patient and any available family and advised that I am the provider in triage and have ordered the appropriate study to initiate their work up based on the clinical presentation during my assessment. I have advised that the patient will be accommodated in the Main ED as soon as possible. I have also requested to contact the triage nurse or myself immediately if the patient experiences any changes in their condition during this brief waiting period. Per doctors note brought with pt, pt fell on 3/6/2019 and was seen at [facility]. Pt returned after fall to [facility] ER on 3/15/2019 for bilateral knee/leg pain and was given Keflex 500mg. Pt again returned to [facility] for infection in her right leg with redness and soreness. Doplar was done and an abscess was found, drained and bandaged. On 3/20/2019 pt returned to [facility] for a wound dressing change. Today, pt went to her PCP office and the dressing was removed and LLE swelling, redness, soreness/pain and heat was found and PCP sent her to [facility] ED for further evaluation and IV antibiotics. Currently, pt states her pain has worsened and her wound on her leg has not gotten any better. (-) nausea, vomiting, chills. Note written by Robinson Jurado, as dictated by Dr. Marta Peterson 1:35 PM 
 
 
 
The history is provided by the patient. No  was used. Past Medical History:  
Diagnosis Date  Arthritis  GERD (gastroesophageal reflux disease)  Hypercholesterolemia  Hypertension  Other ill-defined conditions(799.89) MGUS (MONOCLONAL GAMMOPATHY OF UNCLEAR SIGNIFICANCE)  Prediabetes  Seizures ([facility] [address] 2016 PER NEUROLOGIST ABSENCE SEIZURES, patient states  Stroke [facility]) 2006 TIA  Syncope and collapse   
 loop recorder placed 4/2016 (syncope of unknown etiology) Past Surgical History:  
Procedure Laterality Date  HX APPENDECTOMY    
 REMOVED DURING HYSTERECTOMY  HX COLONOSCOPY    
 HX DILATION AND CURETTAGE    
 HX HYSTERECTOMY  1996  HX IMPLANTABLE LOOP RECORDER  04/29/2016  HX KNEE ARTHROSCOPY  12/17/2009/ AND 2008 RIGHT KNEE  
 HX KNEE REPLACEMENT Right 2013 2016  HX ORTHOPAEDIC  9/09 AND  5/24/2011 RIGHT TOTAL KNEE  
 HX ORTHOPAEDIC  1/31/2011   10/24/2011 REMOVED KNEE REPLACEMENT  
 HX ORTHOPAEDIC Left 09/05/2017 HAND  HX ROTATOR CUFF REPAIR Right 2008  HX SHOULDER ARTHROSCOPY  2003 RIGHT Family History:  
Problem Relation Age of Onset  Hypertension Mother  Diabetes Mother  Heart Disease Mother MI  
 Heart Attack Mother  Heart Disease Father MI  
 Heart Attack Father  Heart Surgery Sister STENT, VALVE SURGERY  
 Hypertension Sister  Diabetes Sister  Heart Attack Brother  Hypertension Brother  Diabetes Sister  Hypertension Sister  Elevated Lipids Sister  Anesth Problems Neg Hx Social History Socioeconomic History  Marital status:  Spouse name: Not on file  Number of children: Not on file  Years of education: Not on file  Highest education level: Not on file Occupational History  Not on file Social Needs  Financial resource strain: Not on file  Food insecurity:  
  Worry: Not on file Inability: Not on file  Transportation needs:  
  Medical: Not on file Non-medical: Not on file Tobacco Use  Smoking status: Never Smoker  Smokeless tobacco: Never Used Substance and Sexual Activity  Alcohol use: No  
  Comment: RARE- WINE 1/YR  
 Drug use: No  
 Sexual activity: Not on file Lifestyle  Physical activity:  
  Days per week: Not on file Minutes per session: Not on file  Stress: Not on file Relationships  Social connections:  
  Talks on phone: Not on file Gets together: Not on file Attends Congregational service: Not on file Active member of club or organization: Not on file Attends meetings of clubs or organizations: Not on file Relationship status: Not on file  Intimate partner violence:  
  Fear of current or ex partner: Not on file Emotionally abused: Not on file Physically abused: Not on file Forced sexual activity: Not on file Other Topics Concern  Not on file Social History Narrative  Not on file ALLERGIES: Patient has no known allergies. Review of Systems Constitutional: Negative for chills and fever. Gastrointestinal: Negative for nausea and vomiting. Skin: Positive for wound. Negative for rash. Neurological: Negative for weakness and numbness. Hematological: Does not bruise/bleed easily. All other systems reviewed and are negative. There were no vitals filed for this visit. Physical Exam  
Constitutional: She is oriented to person, place, and time. She appears well-developed and well-nourished. No distress. HENT:  
Head: Normocephalic and atraumatic. Mouth/Throat: Oropharynx is clear and moist.  
Eyes: Pupils are equal, round, and reactive to light. EOM are normal.  
Neck: Normal range of motion. Neck supple. Cardiovascular: Normal rate, regular rhythm, normal heart sounds and intact distal pulses. Exam reveals no gallop and no friction rub. No murmur heard. Pulmonary/Chest: Effort normal and breath sounds normal. She has no wheezes. Abdominal: Soft. Bowel sounds are normal.  
Musculoskeletal: Normal range of motion. Neurological: She is alert and oriented to person, place, and time. She has normal reflexes. Skin: Skin is warm and dry. No rash noted. Psychiatric: She has a normal mood and affect. Her behavior is normal.  
Nursing note and vitals reviewed. MDM Number of Diagnoses or Management Options Cellulitis of right lower extremity: Diagnosis management comments: 71 y/o female with cellulitis to right lower extremity failed outpatient oral antibiotics Will admit for IV antibiotics Discussed with and evaluated by Dr. Fidel Hurt Amount and/or Complexity of Data Reviewed Clinical lab tests: ordered and reviewed Tests in the medicine section of CPT®: ordered and reviewed Procedures

## 2019-03-22 NOTE — ROUTINE PROCESS
Bedside and Verbal shift change report given to Geneva (oncoming nurse) by Hilton Luo (offgoing nurse). Report included the following information SBAR, Kardex, ED Summary, Intake/Output, MAR and Recent Results.

## 2019-03-22 NOTE — PROGRESS NOTES
3/22/2019 
4:20 PM 
Case management note Met with patient to discuss discharge planning. Patient's son lives with her and they are a few steps. She uses a cane and is independent with ADL's prior to admission. [facility] in [place] [address] No advance directive Dr. Vargas Last Reason for Admission:   cellulitis RRAT Score:         11 Plan for utilizing home health:      TBD Likelihood of Readmission:  Low/green Transition of Care Plan:       Home with follow up and family assistance Care Management Interventions PCP Verified by CM: Yes Mode of Transport at Discharge: Self Transition of Care Consult (CM Consult): Discharge Planning Current Support Network: Relative's Home Confirm Follow Up Transport: Family Plan discussed with Pt/Family/Caregiver: Yes Discharge Location Discharge Placement: Home with family assistance Elizabeth Moore N Tavares rCow

## 2019-03-22 NOTE — ED TRIAGE NOTES
Pt arrives from pcp with c/o of redness and swelling to the right lower leg, pt was seen in the ED couple days ago with the same problem.

## 2019-03-22 NOTE — ED NOTES
Patient Throughput:  Charge nurse on 5th floor made aware of patient's room assignment, room 531 Callie Carrillo, RN Shift Resource Nurse Emergency Department

## 2019-03-23 VITALS
SYSTOLIC BLOOD PRESSURE: 117 MMHG | HEIGHT: 66 IN | RESPIRATION RATE: 17 BRPM | WEIGHT: 188 LBS | TEMPERATURE: 97.9 F | HEART RATE: 80 BPM | DIASTOLIC BLOOD PRESSURE: 72 MMHG | BODY MASS INDEX: 30.22 KG/M2 | OXYGEN SATURATION: 100 %

## 2019-03-23 PROBLEM — Z86.73 HX OF COMPLETED STROKE: Status: ACTIVE | Noted: 2019-03-23

## 2019-03-23 PROBLEM — D47.2 MONOCLONAL PARAPROTEINEMIA: Status: RESOLVED | Noted: 2017-10-17 | Resolved: 2019-03-23

## 2019-03-23 PROBLEM — R60.0 EDEMA, LOWER EXTREMITY: Status: ACTIVE | Noted: 2019-03-23

## 2019-03-23 PROBLEM — R73.03 PREDIABETES: Status: RESOLVED | Noted: 2017-10-17 | Resolved: 2019-03-23

## 2019-03-23 PROBLEM — M17.12 OSTEOARTHRITIS OF LEFT KNEE: Status: RESOLVED | Noted: 2017-10-30 | Resolved: 2019-03-23

## 2019-03-23 PROBLEM — L03.90 CELLULITIS: Status: ACTIVE | Noted: 2019-03-23

## 2019-03-23 LAB
CREAT SERPL-MCNC: 0.86 MG/DL (ref 0.55–1.02)
INR PPP: 1.1 (ref 0.9–1.1)
PROTHROMBIN TIME: 10.8 SEC (ref 9–11.1)

## 2019-03-23 PROCEDURE — 82565 ASSAY OF CREATININE: CPT

## 2019-03-23 PROCEDURE — 36415 COLL VENOUS BLD VENIPUNCTURE: CPT

## 2019-03-23 PROCEDURE — 74011250637 HC RX REV CODE- 250/637: Performed by: INTERNAL MEDICINE

## 2019-03-23 PROCEDURE — 74011000258 HC RX REV CODE- 258: Performed by: INTERNAL MEDICINE

## 2019-03-23 PROCEDURE — 85610 PROTHROMBIN TIME: CPT

## 2019-03-23 PROCEDURE — 74011250636 HC RX REV CODE- 250/636: Performed by: INTERNAL MEDICINE

## 2019-03-23 RX ORDER — AMOXICILLIN AND CLAVULANATE POTASSIUM 875; 125 MG/1; MG/1
1 TABLET, FILM COATED ORAL EVERY 12 HOURS
Status: DISCONTINUED | OUTPATIENT
Start: 2019-03-24 | End: 2019-03-23 | Stop reason: HOSPADM

## 2019-03-23 RX ORDER — AMOXICILLIN AND CLAVULANATE POTASSIUM 875; 125 MG/1; MG/1
1 TABLET, FILM COATED ORAL EVERY 12 HOURS
Qty: 10 TAB | Refills: 0 | Status: SHIPPED | OUTPATIENT
Start: 2019-03-24 | End: 2019-03-29

## 2019-03-23 RX ORDER — AMOXICILLIN AND CLAVULANATE POTASSIUM 875; 125 MG/1; MG/1
1 TABLET, FILM COATED ORAL EVERY 12 HOURS
Status: DISCONTINUED | OUTPATIENT
Start: 2019-03-24 | End: 2019-03-23

## 2019-03-23 RX ADMIN — LOSARTAN POTASSIUM 100 MG: 50 TABLET, FILM COATED ORAL at 08:36

## 2019-03-23 RX ADMIN — HYDROCHLOROTHIAZIDE 25 MG: 25 TABLET ORAL at 08:36

## 2019-03-23 RX ADMIN — CEFEPIME 2 G: 2 INJECTION, POWDER, FOR SOLUTION INTRAVENOUS at 05:07

## 2019-03-23 RX ADMIN — ASPIRIN 81 MG: 81 TABLET ORAL at 08:37

## 2019-03-23 RX ADMIN — FOLIC ACID 1 MG: 1 TABLET ORAL at 08:37

## 2019-03-23 RX ADMIN — Medication 10 ML: at 05:25

## 2019-03-23 RX ADMIN — VANCOMYCIN HYDROCHLORIDE 1250 MG: 10 INJECTION, POWDER, LYOPHILIZED, FOR SOLUTION INTRAVENOUS at 05:00

## 2019-03-23 RX ADMIN — PANTOPRAZOLE SODIUM 40 MG: 40 TABLET, DELAYED RELEASE ORAL at 06:47

## 2019-03-23 RX ADMIN — AMLODIPINE BESYLATE 10 MG: 5 TABLET ORAL at 08:36

## 2019-03-23 NOTE — PROGRESS NOTES
[facility] Medical Progress Note NAME: Kitty Baca :  1950 MRM:  [id number] Date/Time: 3/23/2019  11:02 AM 
 
  
Assessment and Plan:  
 
Cellulitis of leg, right - POA, started 2 weeks ago after a fall. She delayed Abx for a week, then started keflex, and switched to Doxycycline after 1 day. Has seen multiple doctors (2 ERs, ortho, PCP). PCP saw the wound for the first time yesterday and told her to go to ER, even though she felt wound was finally improving. She has NO SIRS criteria. She is ambulatory. Xray find no air or foreign body. US finds no DVT. She can complete PO Doxy at home. I will give her Rx for Augmentin, PO, if she feels doxy not curing wound. Edema, lower extremity - Mild, bilateral.  US without DVT. I will stop norvasc. I suspect some venous insufficiency. KHANG hose and elevation. Hx Seizures disorder - No symptoms. Continue gabapentin and keppra Hypertension - BP low normal, Stop norvasc. Continue cozaar and HCTZ. PCP to start BB if needed. Hypercholesterolemia - continue atorvastatin GERD (gastroesophageal reflux disease) - No symptoms. Continue PPI. Hx of completed stroke - No residual deficits. Continue ASA and statin, not on BB for unclear reasons. Pulse would handle it. Subjective: Chief Complaint:  Feels fine. Wound stable. ROS: 
(bold if positive, if negative) Tolerating PT   Tolerating Diet Objective:  
 
Last 24hrs VS reviewed since prior progress note. Most recent are: 
 
Visit Vitals /67 (BP 1 Location: Left arm, BP Patient Position: At rest) Pulse 89 Temp 98.4 °F (36.9 °C) Resp 16 Ht 5' 6\" (1.676 m) Wt 85.3 kg (188 lb) SpO2 99% BMI 30.34 kg/m² SpO2 Readings from Last 6 Encounters:  
19 99% 19 99% 19 100% 03/10/19 100% 17 96% 10/16/17 100% No intake or output data in the 24 hours ending 19 1102 Physical Exam: Gen:  Obese, in no acute distress HEENT:  Pink conjunctivae, PERRL, hearing intact to voice, moist mucous membranes Neck:  Supple, without masses, thyroid non-tender Resp:  No accessory muscle use, clear breath sounds without wheezes rales or rhonchi 
Card:  No murmurs, normal S1, S2 without thrills, bruits or peripheral edema Abd:  Soft, non-tender, non-distended, normoactive bowel sounds are present, no mass Lymph:  No cervical or inguinal adenopathy Musc:  No cyanosis or clubbing Skin:  Minimally swelling and erythema around RLE calf wound, skin turgor is good Neuro:  Cranial nerves are grossly intact, no focal motor weakness, follows commands appropriately Psych:  Good insight, oriented to person, place and time, alert Telemetry reviewed:   normal sinus rhythm 
__________________________________________________________________ Medications Reviewed: (see below) Medications:  
 
Current Facility-Administered Medications Medication Dose Route Frequency  [START ON 3/24/2019] amoxicillin-clavulanate (AUGMENTIN) 875-125 mg per tablet 1 Tab  1 Tab Oral Q12H  
 sodium chloride (NS) flush 5-40 mL  5-40 mL IntraVENous Q8H  
 sodium chloride (NS) flush 5-40 mL  5-40 mL IntraVENous PRN  
 acetaminophen (TYLENOL) tablet 650 mg  650 mg Oral Q4H PRN  
 morphine injection 2 mg  2 mg IntraVENous Q4H PRN  
 enoxaparin (LOVENOX) injection 40 mg  40 mg SubCUTAneous Q24H  
 oxyCODONE IR (ROXICODONE) tablet 5 mg  5 mg Oral Q4H PRN  
 aspirin delayed-release tablet 81 mg  81 mg Oral DAILY  folic acid (FOLVITE) tablet 1 mg  1 mg Oral DAILY  gabapentin (NEURONTIN) capsule 300 mg  300 mg Oral QHS  levETIRAcetam (KEPPRA XR) ER tablet 1,000 mg  1,000 mg Oral QHS  pantoprazole (PROTONIX) tablet 40 mg  40 mg Oral ACB  losartan (COZAAR) tablet 100 mg  100 mg Oral DAILY And  
 hydroCHLOROthiazide (HYDRODIURIL) tablet 25 mg  25 mg Oral DAILY Lab Data Reviewed: (see below) Lab Review: Recent Labs  
  03/22/19 
1407 03/20/19 
1334 WBC 6.5 7.0 HGB 11.0* 10.9* HCT 37.5 34.5*  
 439* Recent Labs  
  03/23/19 
0531 03/22/19 
1407 03/20/19 
1334 NA  --  142 141 K  --  3.1* 4.1 CL  --  108 107 CO2  --  26 30 GLU  --  115* 77 BUN  --  21* 21* CREA 0.86 1.02 1.17* CA  --  9.2 9.2 ALB  --  3.2*  --   
TBILI  --  0.2  --   
SGOT  --  21  --   
ALT  --  21  --   
INR 1.1  --   --   
 
Lab Results Component Value Date/Time Glucose (POC) 81 11/02/2017 11:43 AM  
 Glucose (POC) 107 (H) 11/02/2017 07:10 AM  
 Glucose (POC) 109 (H) 11/01/2017 09:31 PM  
 Glucose (POC) 91 11/01/2017 04:47 PM  
 Glucose (POC) 123 (H) 11/01/2017 11:26 AM  
 
No results for input(s): PH, PCO2, PO2, HCO3, FIO2 in the last 72 hours. Recent Labs  
  03/23/19 
0531 INR 1.1 All Micro Results Procedure Component Value Units Date/Time CULTURE, MRSA [[id number]] Collected:  03/22/19 1802 Order Status:  Completed Specimen:  Nares Updated:  03/22/19 2233 Naty Hartington STAIN [[id number]] Collected:  03/22/19 1812 Order Status:  Completed Specimen:  Leg Updated:  03/22/19 2220 Special Requests: NO SPECIAL REQUESTS     
  GRAM STAIN NO WBC'S SEEN     
   NO ORGANISMS SEEN Culture result: PENDING I have reviewed notes of prior 24hr. Other pertinent lab: none Total time spent with patient: 45 Minutes Care Plan discussed with: Patient, Care Manager, Nursing Staff and >50% of time spent in counseling and coordination of care Discussed:  Care Plan and D/C Planning Prophylaxis:  H2B/PPI Disposition:  Home w/Family 
        
___________________________________________________ Attending Physician: Kayla Knight MD

## 2019-03-23 NOTE — ROUTINE PROCESS
I have reviewed discharge instructions with the patient. The patient verbalized understanding. Pt is now waiting for her family to pick her up. Charge RN notified. 13:20 - Pt is now leaving in stable condition.

## 2019-03-23 NOTE — DISCHARGE SUMMARY
Physician Discharge Summary     Patient ID:  Joselo Solorio  [id number]  1 y.o.  1950    Admit date: 3/22/2019    Discharge date and time: 3/23/2019    Admission Diagnoses: Cellulitis of leg, right [L03.115]  Cellulitis [L03.90]    Discharge Diagnoses:    Principal Diagnosis   Cellulitis of leg, right                                             Other Diagnoses    Seizures ([address] (1/1/2016)    Hypertension ()    Hypercholesterolemia ()    GERD (gastroesophageal reflux disease) ()    Edema, lower extremity (3/23/2019)    Hx of completed stroke (3/23/2019)    Cellulitis (3/23/2019)     Hospital Course:   Cellulitis of leg, right - POA, started 2 weeks ago after a fall. She delayed Abx for a week, then started keflex, and switched to Doxycycline after 1 day. Has seen multiple doctors (2 ERs, ortho, PCP). PCP saw the wound for the first time yesterday and told her to go to ER, even though she herself felt wound was finally improving. She has NO SIRS criteria. She is ambulatory. Xray find no air or foreign body. US finds no DVT. She can complete PO Doxy at home. I will give her Rx for Augmentin, PO, if she feels doxy not curing wound.     Edema, lower extremity / Obese - Mild, bilateral Le edema POA. US without DVT. I will stop norvasc, which is likely the problem. I also suspect some venous insufficiency related to obesity. Advise weight loss. KHANG hose and elevation.     Hx Seizures disorder - No symptoms. Continue gabapentin and keppra     Hypertension - BP low normal, Stop norvasc. Continue cozaar and HCTZ. PCP to start BB if needed.     Hypercholesterolemia - continue atorvastatin     GERD (gastroesophageal reflux disease) - No symptoms. Continue PPI.     Hx of completed stroke - No residual deficits. Continue ASA and statin, not on BB for unclear reasons.   Pulse would handle it.     PCP: Juancarlos Danielle MD    Consults: None    Significant Diagnostic Studies: See [facility] Course    Discharged home in improved condition. Discharge Exam:  /67 (BP 1 Location: Left arm, BP Patient Position: At rest)   Pulse 89   Temp 98.4 °F (36.9 °C)   Resp 16   Ht 5' 6\" (1.676 m)   Wt 85.3 kg (188 lb)   SpO2 99%   BMI 30.34 kg/m²      Gen:  Obese, in no acute distress  HEENT:  Pink conjunctivae, PERRL, hearing intact to voice, moist mucous membranes  Neck:  Supple, without masses, thyroid non-tender  Resp:  No accessory muscle use, clear breath sounds without wheezes rales or rhonchi  Card:  No murmurs, normal S1, S2 without thrills, bruits or peripheral edema  Abd:  Soft, non-tender, non-distended, normoactive bowel sounds are present, no mass  Lymph:  No cervical or inguinal adenopathy  Musc:  No cyanosis or clubbing  Skin:  Minimally swelling and erythema around RLE calf wound, skin turgor is good  Neuro:  Cranial nerves are grossly intact, no focal motor weakness, follows commands appropriately  Psych:  Good insight, oriented to person, place and time, alert    Patient Instructions:   Current Discharge Medication List      START taking these medications    Details   amoxicillin-clavulanate (AUGMENTIN) 875-125 mg per tablet Take 1 Tab by mouth every twelve (12) hours for 5 days. Start this, and stop Doxycycline, if wound worsens  Indications: skin infection  Qty: 10 Tab, Refills: 0         CONTINUE these medications which have NOT CHANGED    Details   levETIRAcetam (KEPPRA XR) 500 mg ER tablet Take 1,000 mg by mouth nightly.      gabapentin (NEURONTIN) 300 mg capsule Take 300 mg by mouth nightly. aspirin delayed-release 81 mg tablet Take 81 mg by mouth daily. folic acid (FOLVITE) 1 mg tablet Take 1 mg by mouth daily. methotrexate (RHEUMATREX) 2.5 mg tablet Take 20 mg by mouth every Wednesday. doxycycline (VIBRA-TABS) 100 mg tablet Take 1 Tab by mouth two (2) times a day for 10 days.   Qty: 20 Tab, Refills: 0      traMADol (ULTRAM) 50 mg tablet Take 1 Tab by mouth every six (6) hours as needed for Pain for up to 7 days. Max Daily Amount: 200 mg. Qty: 10 Tab, Refills: 0    Associated Diagnoses: Abscess of right lower leg; Cellulitis of right lower leg      omeprazole (PRILOSEC) 20 mg capsule Take 20 mg by mouth Daily (before breakfast). atorvastatin (LIPITOR) 20 mg tablet Take 20 mg by mouth daily. Cholecalciferol, Vitamin D3, (VITAMIN D) 1,000 unit Cap Take 2 Tabs by mouth daily. losartan-hydrochlorothiazide (HYZAAR) 100-25 mg per tablet Take 1 Tab by mouth daily. potassium chloride SR (KLOR-CON 10) 10 mEq tablet Take 10 mEq by mouth two (2) times a day. STOP taking these medications       amLODIPine (NORVASC) 10 mg tablet Comments:   Reason for Stopping:             Activity: Activity as tolerated  Diet: Cardiac Diet and Increase noncaffeinated fluids  Wound Care: Keep wound clean and dry, Reinforce dressing PRN and As directed    Follow-up with your PCP in 1 week.   Follow-up tests/labs - none    Signed:  Ryan Salter MD  3/23/2019  11:14 AM

## 2019-03-23 NOTE — ROUTINE PROCESS
Bedside and Verbal shift change report given to Mando Young RN by Navdeep Mg RN. Report included the following information SBAR, Kardex, ED Summary, Intake/Output, MAR, Recent Results and Cardiac Rhythm .

## 2019-03-23 NOTE — ROUTINE PROCESS
Bedside and Verbal shift change report given to ELISEO Seymour by Alicia Aguirre RN. Report included the following information SBAR, Kardex, ED Summary, Intake/Output, MAR, Recent Results and Cardiac Rhythm .

## 2019-03-24 LAB
BACTERIA SPEC CULT: NORMAL
GRAM STN SPEC: NORMAL
GRAM STN SPEC: NORMAL
SERVICE CMNT-IMP: NORMAL

## 2019-03-25 NOTE — PHYSICIAN ADVISORY
Short Stay Review Pt Name:  Carolene Boas  
MR#  [id number] CSN#   [id number] Room and Hospital  531/01  @ [facility] Hospitalization date  3/22/2019  1:40 PM 
3/23/2019  7:01 PM  
Current Attending Physician  No att. providers found A discharge order has been placed for this episode of hospital care for Ms. Carolene Boas; since this hospital stay is less than two midnights, I reviewed Ms. Linn Aguilar's chart. Ms. Torito Aguilar's healthcare insuHNTrance/benefit include: 
Payor: [place] MED[place]ARE / Plan: [place] MEDICARE PART A & B / Product Type: Medicare /  
 
Utilization Review related case summary:  
Age  70 y.o.  
BMI  Body mass index is 30.34 kg/m². PMHx includes  HTN  HTN GERD etc.   
Hospital course  The pt was suspected to have failed OP Rx for cellulitis and she was hospitalized with plan for IV abx etc.  
  
Risk of deterioration at the time this patient  was hospitalized     Moderate On the basis of chart review, this patient's hospitalization status     
is appropriate for INPATIENT Goldy Zhu MD MPH FACP Cell : [phone] Physician Advisor Michaela 48 [phone]  
Utilization Review, Care Management CSN:  [id number] KATIE:   [id number] Admitted on :  3/22/2019 Discharge order  3/23/2019

## 2019-03-26 ENCOUNTER — HOSPITAL ENCOUNTER (OUTPATIENT)
Dept: WOUND CARE | Age: 69
Discharge: HOME | End: 2019-03-26
Payer: MEDICARE

## 2019-03-26 VITALS
SYSTOLIC BLOOD PRESSURE: 148 MMHG | RESPIRATION RATE: 18 BRPM | DIASTOLIC BLOOD PRESSURE: 72 MMHG | WEIGHT: 183 LBS | HEIGHT: 66 IN | BODY MASS INDEX: 29.41 KG/M2 | TEMPERATURE: 98.3 F | HEART RATE: 86 BPM

## 2019-03-26 PROCEDURE — 10140 I&D HMTMA SEROMA/FLUID COLLJ: CPT

## 2019-03-26 RX ORDER — ACETAMINOPHEN AND CODEINE PHOSPHATE 300; 30 MG/1; MG/1
1 TABLET ORAL
COMMUNITY
End: 2022-06-14

## 2019-03-26 NOTE — WOUND CARE
03/26/19 1405   Wound Leg Lower Right   Date First Assessed/Time First Assessed: 03/26/19 1400   POA: Yes  Location: Leg Lower  Orientation: Right   Dressing Status  Clean, dry, and intact   Dressing Type  Dry dressing   Wound Length (cm) 1.2 cm   Wound Width (cm) 0.3 cm   Wound Depth (cm) 0.1   Wound Surface area (cm^2) 0.36 cm^2   Tissue Type Black   Drainage Amount  None   Wound Odor None   Periwound Skin Condition Edematous; Erythema, non-blanchable; Increased warmth; Indurated   Cleansing and Cleansing Agents  Normal saline     Visit Vitals  /72 (BP 1 Location: Right arm, BP Patient Position: At rest)   Pulse 86   Temp 98.3 °F (36.8 °C)   Resp 18   Ht 5' 6\" (1.676 m)   Wt 83 kg (183 lb)   BMI 29.54 kg/m²

## 2019-03-26 NOTE — WOUND CARE
Bharti Zhao, BRENDEN - Aicha Cox. Cesar Yola, [address] - H&P     Assessment/Plan:  Hematoma RLE  Contusion RLE, initial encounter    - Pt evaluated and treated. - RLE appears to be hematoma, had undergone bedside I&D at [facility] ED, cx revealed no WBC and no evidence of bacterial growth. - Site underwent debridement as noted below. Appears to be hematoma. Will pack incision with mesalt and cover with exudry, and will send for CT RLE with contrast to R/O further fluid collection. About 10 cc sanguinous drainage expressed, no purulence noted. - Rx Percocet for pain, reviewed issues with opioid use / abuse. - F/U 1 week. Subjective:  Pt complains of wound to RLE. Previous tx include bedside I&D, PO abx. Negative for fever, chills, nausea, vomiting, chest pain, shortness of breath. HPI: 70 y.o. F presents c/o pain / swelling to RLE. Had been told she had hematoma there and underwent bedside I&D to RT leg at [facility] ED and was given PO abx, but site has remained painful and swollen so she presented here for further assessment. Issue started 3-6-19 when she fell down stairs and banged her leg.     History:  Right Lower Leg  No Known Allergies  Family History   Problem Relation Age of Onset    Hypertension Mother     Diabetes Mother     Heart Disease Mother         MI    Heart Attack Mother     Heart Disease Father         MI    Heart Attack Father     Heart Surgery Sister         STENT, VALVE SURGERY    Hypertension Sister     Diabetes Sister     Heart Attack Brother     Diabetes Brother     Diabetes Brother     Hypertension Brother     Diabetes Sister     Hypertension Sister     Elevated Lipids Sister     Heart Surgery Sister     Anesth Problems Neg Hx       Past Medical History:   Diagnosis Date    Arthritis     GERD (gastroesophageal reflux disease)     Hypercholesterolemia     Hypertension     Ill-defined condition     cellulitis    Other ill-defined conditions(799.89)     MGUS (MONOCLONAL GAMMOPATHY OF UNCLEAR SIGNIFICANCE)    Prediabetes     Seizures ([facility] [address] 2016    PER NEUROLOGIST ABSENCE SEIZURES, patient states    Stroke [facility]) 2006    TIA    Syncope and collapse     loop recorder placed 4/2016 (syncope of unknown etiology)     Past Surgical History:   Procedure Laterality Date    HX APPENDECTOMY      REMOVED DURING HYSTERECTOMY    HX COLONOSCOPY      HX DILATION AND CURETTAGE      HX HYSTERECTOMY  1996    HX IMPLANTABLE LOOP RECORDER  04/29/2016    HX KNEE ARTHROSCOPY  12/17/2009/ AND 2008    RIGHT KNEE    HX KNEE REPLACEMENT Right 2013 2016    HX ORTHOPAEDIC  9/09 AND  5/24/2011    RIGHT TOTAL KNEE, L-knee replacement    HX ORTHOPAEDIC  1/31/2011   10/24/2011    REMOVED KNEE REPLACEMENT    HX ORTHOPAEDIC Left 09/05/2017    HAND    HX ROTATOR CUFF REPAIR Right 2008    HX SHOULDER ARTHROSCOPY  2003    RIGHT     Social History     Tobacco Use    Smoking status: Never Smoker    Smokeless tobacco: Never Used   Substance Use Topics    Alcohol use: No     Comment: RARE- WINE 1/YR       Social History     Substance and Sexual Activity   Alcohol Use No    Comment: RARE- WINE 1/YR     Social History     Substance and Sexual Activity   Drug Use No      Social History     Tobacco Use   Smoking Status Never Smoker   Smokeless Tobacco Never Used     Current Outpatient Medications   Medication Sig    acetaminophen-codeine (TYLENOL-CODEINE #3) 300-30 mg per tablet Take 1 Tab by mouth every eight (8) hours as needed for Pain.  amoxicillin-clavulanate (AUGMENTIN) 875-125 mg per tablet Take 1 Tab by mouth every twelve (12) hours for 5 days. Start this, and stop Doxycycline, if wound worsens  Indications: skin infection    levETIRAcetam (KEPPRA XR) 500 mg ER tablet Take 1,000 mg by mouth nightly.     gabapentin (NEURONTIN) 300 mg capsule Take 300 mg by mouth nightly.  aspirin delayed-release 81 mg tablet Take 81 mg by mouth daily.  folic acid (FOLVITE) 1 mg tablet Take 1 mg by mouth daily.  methotrexate (RHEUMATREX) 2.5 mg tablet Take 20 mg by mouth every Wednesday.  doxycycline (VIBRA-TABS) 100 mg tablet Take 1 Tab by mouth two (2) times a day for 10 days.  omeprazole (PRILOSEC) 20 mg capsule Take 20 mg by mouth Daily (before breakfast).  atorvastatin (LIPITOR) 20 mg tablet Take 20 mg by mouth daily.  Cholecalciferol, Vitamin D3, (VITAMIN D) 1,000 unit Cap Take 2 Tabs by mouth daily.  losartan-hydrochlorothiazide (HYZAAR) 100-25 mg per tablet Take 1 Tab by mouth daily.  potassium chloride SR (KLOR-CON 10) 10 mEq tablet Take 10 mEq by mouth two (2) times a day. No current facility-administered medications for this encounter. Objective:  Visit Vitals  /72 (BP 1 Location: Right arm, BP Patient Position: At rest)   Pulse 86   Temp 98.3 °F (36.8 °C)   Resp 18   Ht 5' 6\" (1.676 m)   Wt 83 kg (183 lb)   BMI 29.54 kg/m²       Vascular:  B/L LE  DP 0/4; PT 0/4  capillary fill time brisk, pitting edema is present, skin temperature is cool, varicosities are present. Dermatological:    Wound: RLE  Location: lower leg  Measurements: per RN note  Margins: mild local erythema / edema / warmth  Drainage: moderate sanguinous  Odor: none  Wound base: granular  Lymphangitic streaking? No.  Undermining? No.  Sinus tracts? No.  Exposed bone? No.  Subcutaneous crepitation on palpation? No.    Nails are thickened, elongated, discolored, painful to palpation, 2mm thick, with subungual debris. Skin is dry and scaly, no hemosiderin deposition. There is no maceration of the interspaces of the feet b/l.       Neurological:  DTR are present, protective sensation per 5.07 [place] Amrita monofilament is present, patient is AAOx3, mood is normal. Epicritic sensation is intact. Orthopedic:  B/L LE are symmetric, ROM of ankle, STJ, 1st MTPJ is limited, MMT 5 out of 5 for B/L LE. Constitutional: Pt is a well developed, average middle aged AAF. Lymphatics: negative tenderness to palpation of neck/axillary/inguinal nodes. Imaging / Labs / Cx / Px:  CT RLE ordered    Procedure Note:  Excisional debridement through level of muscle. Location / Ulcer: RT leg  Indication: to remove non-viable tissue from wound bed. Consent in chart. Anesthesia: 8 cc 1% plain lidocaine  Instrument: #15 blade  Residual necrosis: none  Bleeding: minimal  Hemostasis: pressure  Pre-Procedure Pain: 4  Post-Procedure Pain: 7  Area debrided < 20 cm sq. Pre-Debridement measurements: see nursing notes  Post-Debridement measurements: see nursing notes  This is part of a series of staged procedures in an attempt at limb salvage.

## 2019-04-02 ENCOUNTER — HOSPITAL ENCOUNTER (OUTPATIENT)
Dept: WOUND CARE | Age: 69
Discharge: HOME | End: 2019-04-02
Payer: MEDICARE

## 2019-04-02 ENCOUNTER — HOSPITAL ENCOUNTER (OUTPATIENT)
Dept: CT IMAGING | Age: 69
Discharge: HOME | End: 2019-04-02
Attending: PODIATRIST
Payer: MEDICARE

## 2019-04-02 VITALS
DIASTOLIC BLOOD PRESSURE: 78 MMHG | RESPIRATION RATE: 16 BRPM | TEMPERATURE: 98.6 F | SYSTOLIC BLOOD PRESSURE: 151 MMHG | HEART RATE: 80 BPM

## 2019-04-02 PROCEDURE — 73700 CT LOWER EXTREMITY W/O DYE: CPT

## 2019-04-02 PROCEDURE — 99214 OFFICE O/P EST MOD 30 MIN: CPT

## 2019-04-02 PROCEDURE — 74011000250 HC RX REV CODE- 250: Performed by: PODIATRIST

## 2019-04-02 RX ORDER — AMOXICILLIN AND CLAVULANATE POTASSIUM 875; 125 MG/1; MG/1
1 TABLET, FILM COATED ORAL 2 TIMES DAILY
COMMUNITY
End: 2022-06-14

## 2019-04-02 RX ADMIN — Medication: at 14:06

## 2019-04-02 NOTE — WOUND CARE
Mamie Brunson, DPM - Daphne Pack. Lucille Peterson, [address] - Progress Note     Assessment/Plan:  Hematoma RLE  Contusion RLE, subsequent encounter    - Pt evaluated and treated. - Less undermining today, painted with GV will continue with Mesalt and tubigrip. No remaining fluid collection on RLE CT   - Pt having difficulty with Percocet so Rx Tramadol.  - F/U 2 weeks. Subjective:  Pt complains of wound to RLE. Previous tx include bedside I&D, PO abx. Negative for fever, chills, nausea, vomiting, chest pain, shortness of breath. Had been concerned about redness so went to ED at [facility] and was given IV and then PO abx, notes doing a lot better. HPI: 70 y.o. F presents c/o pain / swelling to RLE. Had been told she had hematoma there and underwent bedside I&D to RT leg at [facility] ED and was given PO abx, but site has remained painful and swollen so she presented here for further assessment. Issue started 3-6-19 when she fell down stairs and banged her leg.     History:  Right Lower Leg  No Known Allergies  Family History   Problem Relation Age of Onset    Hypertension Mother     Diabetes Mother     Heart Disease Mother         MI    Heart Attack Mother     Heart Disease Father         MI    Heart Attack Father     Heart Surgery Sister         STENT, VALVE SURGERY    Hypertension Sister     Diabetes Sister     Heart Attack Brother     Diabetes Brother     Diabetes Brother     Hypertension Brother     Diabetes Sister     Hypertension Sister     Elevated Lipids Sister     Heart Surgery Sister     Anesth Problems Neg Hx       Past Medical History:   Diagnosis Date    Arthritis     GERD (gastroesophageal reflux disease)     Hypercholesterolemia     Hypertension     Ill-defined condition     cellulitis    Other ill-defined conditions(480.43) MGUS (MONOCLONAL GAMMOPATHY OF UNCLEAR SIGNIFICANCE)    Prediabetes     Seizures ([facility] [address] 2016    PER NEUROLOGIST ABSENCE SEIZURES, patient states    Stroke [facility]) 2006    TIA    Syncope and collapse     loop recorder placed 4/2016 (syncope of unknown etiology)     Past Surgical History:   Procedure Laterality Date    HX APPENDECTOMY      REMOVED DURING HYSTERECTOMY    HX COLONOSCOPY      HX DILATION AND CURETTAGE      HX HYSTERECTOMY  1996    HX IMPLANTABLE LOOP RECORDER  04/29/2016    HX KNEE ARTHROSCOPY  12/17/2009/ AND 2008    RIGHT KNEE    HX KNEE REPLACEMENT Right 2013 2016    HX ORTHOPAEDIC  9/09 AND  5/24/2011    RIGHT TOTAL KNEE, L-knee replacement    HX ORTHOPAEDIC  1/31/2011   10/24/2011    REMOVED KNEE REPLACEMENT    HX ORTHOPAEDIC Left 09/05/2017    HAND    HX ROTATOR CUFF REPAIR Right 2008    HX SHOULDER ARTHROSCOPY  2003    RIGHT     Social History     Tobacco Use    Smoking status: Never Smoker    Smokeless tobacco: Never Used   Substance Use Topics    Alcohol use: No     Comment: RARE- WINE 1/YR       Social History     Substance and Sexual Activity   Alcohol Use No    Comment: RARE- WINE 1/YR     Social History     Substance and Sexual Activity   Drug Use No      Social History     Tobacco Use   Smoking Status Never Smoker   Smokeless Tobacco Never Used     Current Outpatient Medications   Medication Sig    amoxicillin-clavulanate (AUGMENTIN) 875-125 mg per tablet Take 1 Tab by mouth two (2) times a day.  acetaminophen-codeine (TYLENOL-CODEINE #3) 300-30 mg per tablet Take 1 Tab by mouth every eight (8) hours as needed for Pain.  levETIRAcetam (KEPPRA XR) 500 mg ER tablet Take 1,000 mg by mouth nightly.  gabapentin (NEURONTIN) 300 mg capsule Take 300 mg by mouth nightly.  aspirin delayed-release 81 mg tablet Take 81 mg by mouth daily.  folic acid (FOLVITE) 1 mg tablet Take 1 mg by mouth daily.     methotrexate (RHEUMATREX) 2.5 mg tablet Take 20 mg by mouth every Wednesday.  omeprazole (PRILOSEC) 20 mg capsule Take 20 mg by mouth Daily (before breakfast).  atorvastatin (LIPITOR) 20 mg tablet Take 20 mg by mouth daily.  Cholecalciferol, Vitamin D3, (VITAMIN D) 1,000 unit Cap Take 2 Tabs by mouth daily.  losartan-hydrochlorothiazide (HYZAAR) 100-25 mg per tablet Take 1 Tab by mouth daily.  potassium chloride SR (KLOR-CON 10) 10 mEq tablet Take 10 mEq by mouth two (2) times a day. No current facility-administered medications for this encounter. Objective:  Visit Vitals  /78   Pulse 80   Temp 98.6 °F (37 °C)   Resp 16       Vascular:  B/L LE  DP 0/4; PT 0/4  capillary fill time brisk, pitting edema is present, skin temperature is cool, varicosities are present. Dermatological:    Wound: RLE  Location: lower leg  Measurements: per RN note  Margins: minimal local erythema/edema  Drainage: moderate sanguinous  Odor: none  Wound base: granular  Lymphangitic streaking? No.  Undermining? No.  Sinus tracts? No.  Exposed bone? No.  Subcutaneous crepitation on palpation? No.    Nails are thickened, elongated, discolored, painful to palpation, 2mm thick, with subungual debris. Skin is dry and scaly, no hemosiderin deposition. There is no maceration of the interspaces of the feet b/l. Neurological:  DTR are present, protective sensation per 5.07 [place] Amrita monofilament is present, patient is AAOx3, mood is normal. Epicritic sensation is intact. Orthopedic:  B/L LE are symmetric, ROM of ankle, STJ, 1st MTPJ is limited, MMT 5 out of 5 for B/L LE. Constitutional: Pt is a well developed, average middle aged AAF. Lymphatics: negative tenderness to palpation of neck/axillary/inguinal nodes. Imaging / Labs / Cx / Px:  CT RLE 4-2-19  1. Soft tissue wound extending into the subcutaneous tissues medial to the mid  distal tibia.  Hyperdensity within the wound is likely related to packing  material. There is soft tissue thickening surrounding this likely related to  granulation tissue. No evidence of a focal drainable fluid collection. Mild  subcutaneous edema surrounding the lower extremity below the knee is nonspecific  and may be related to cellulitis or patient volume fluid status. 2. 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 There is  loosening of the femoral stem. Mild lucency at the bone cement interface  associated with the tibial tray may be related to mild loosening as well. 3. Significant medial tilting and subluxation of the patella.

## 2019-04-02 NOTE — WOUND CARE
04/02/19 1410   Wound Leg Lower Right   Date First Assessed/Time First Assessed: 03/26/19 1400   POA: Yes  Location: Leg Lower  Orientation: Right   Dressing Status  Clean, dry, and intact   Dressing Type  Dry dressing   Non-Pressure Injury Full thickness (subcut/muscle)   Wound Length (cm) 1.7 cm   Wound Width (cm) 0.7 cm   Wound Depth (cm) 1.3   Wound Surface area (cm^2) 1.19 cm^2   Change in Wound Size % -230.56   Condition of Base Pink   Drainage Amount  Small    Drainage Color Serosanguinous   Wound Odor None   Periwound Skin Condition Intact   Cleansing and Cleansing Agents  Normal saline     Visit Vitals  /78   Pulse 80   Temp 98.6 °F (37 °C)   Resp 16

## 2019-04-16 ENCOUNTER — HOSPITAL ENCOUNTER (OUTPATIENT)
Dept: WOUND CARE | Age: 69
Discharge: HOME | End: 2019-04-16
Payer: MEDICARE

## 2019-04-16 VITALS
DIASTOLIC BLOOD PRESSURE: 74 MMHG | HEART RATE: 76 BPM | TEMPERATURE: 98.3 F | RESPIRATION RATE: 16 BRPM | SYSTOLIC BLOOD PRESSURE: 127 MMHG

## 2019-04-16 PROCEDURE — 74011000250 HC RX REV CODE- 250: Performed by: PODIATRIST

## 2019-04-16 PROCEDURE — 99214 OFFICE O/P EST MOD 30 MIN: CPT

## 2019-04-16 RX ADMIN — Medication: at 14:54

## 2019-04-16 NOTE — WOUND CARE
04/16/19 1448 Wound Leg Lower Right Date First Assessed/Time First Assessed: 03/26/19 1400   POA: Yes  Location: Leg Lower  Orientation: Right Dressing Status  Clean, dry, and intact Dressing Type  Dry dressing;Gauze 
(packed) Wound Length (cm) 1.7 cm Wound Width (cm) 0.6 cm Wound Depth (cm) 0.3 Wound Surface area (cm^2) 1.02 cm^2 Change in Wound Size % -183.33 Condition of Base Pink Tissue Type Red Drainage Amount  Scant Drainage Color Serosanguinous Wound Odor None Periwound Skin Condition Intact Cleansing and Cleansing Agents  Normal saline Visit Vitals /74 (BP 1 Location: Left arm, BP Patient Position: Sitting) Pulse 76 Temp 98.3 °F (36.8 °C) Resp 16

## 2019-04-17 NOTE — WOUND CARE
Jason Coats DPM - [place]. Estela Mishra, [place] 8 Note Assessment/Plan: 
Hematoma RLE Contusion RLE, subsequent encounter - Pt evaluated and treated. - Minimal to no undermining at this time, painted with GV will continue with Mesalt and tubigrip. No remaining fluid collection on RLE CT  
- Pt has enough pain medication - F/U 21 wk. Subjective: 
Pt complains of wound to RLE. Previous tx include bedside I&D, PO abx. Negative for fever, chills, nausea, vomiting, chest pain, shortness of breath. Notes wound doing better. HPI: 70 y.o. F presents c/o pain / swelling to RLE. Had been told she had hematoma there and underwent bedside I&D to RT leg at [facility] ED and was given PO abx, but site has remained painful and swollen so she presented here for further assessment. Issue started 3-6-19 when she fell down stairs and banged her leg. History: 
Right Lower Leg No Known Allergies Family History Problem Relation Age of Onset  Hypertension Mother  Diabetes Mother  Heart Disease Mother MI  
 Heart Attack Mother  Heart Disease Father MI  
 Heart Attack Father  Heart Surgery Sister STENT, VALVE SURGERY  
 Hypertension Sister  Diabetes Sister  Heart Attack Brother  Diabetes Brother  Diabetes Brother  Hypertension Brother  Diabetes Sister  Hypertension Sister  Elevated Lipids Sister  Heart Surgery Sister  Anesth Problems Neg Hx Past Medical History:  
Diagnosis Date  Arthritis  GERD (gastroesophageal reflux disease)  Hypercholesterolemia  Hypertension  Ill-defined condition   
 cellulitis  Other ill-defined conditions(219.89) MGUS (MONOCLONAL GAMMOPATHY OF UNCLEAR SIGNIFICANCE)  Prediabetes  Seizures ([facility] [address] 2016 PER NEUROLOGIST ABSENCE SEIZURES, patient states  Stroke [facility]) 2006 TIA  Syncope and collapse   
 loop recorder placed 4/2016 (syncope of unknown etiology) Past Surgical History:  
Procedure Laterality Date  HX APPENDECTOMY    
 REMOVED DURING HYSTERECTOMY  HX COLONOSCOPY    
 HX DILATION AND CURETTAGE    
 HX HYSTERECTOMY  1996  HX IMPLANTABLE LOOP RECORDER  04/29/2016  HX KNEE ARTHROSCOPY  12/17/2009/ AND 2008 RIGHT KNEE  
 HX KNEE REPLACEMENT Right 2013 2016  HX ORTHOPAEDIC  9/09 AND  5/24/2011 RIGHT TOTAL KNEE, L-knee replacement  HX ORTHOPAEDIC  1/31/2011   10/24/2011 REMOVED KNEE REPLACEMENT  
 HX ORTHOPAEDIC Left 09/05/2017 HAND  HX ROTATOR CUFF REPAIR Right 2008  HX SHOULDER ARTHROSCOPY  2003 RIGHT Social History Tobacco Use  Smoking status: Never Smoker  Smokeless tobacco: Never Used Substance Use Topics  Alcohol use: No  
  Comment: RARE- WINE 1/YR Social History Substance and Sexual Activity Alcohol Use No  
 Comment: RARE- WINE 1/YR Social History Substance and Sexual Activity Drug Use No  
  
Social History Tobacco Use Smoking Status Never Smoker Smokeless Tobacco Never Used Current Outpatient Medications Medication Sig  
 amoxicillin-clavulanate (AUGMENTIN) 875-125 mg per tablet Take 1 Tab by mouth two (2) times a day.  acetaminophen-codeine (TYLENOL-CODEINE #3) 300-30 mg per tablet Take 1 Tab by mouth every eight (8) hours as needed for Pain.  levETIRAcetam (KEPPRA XR) 500 mg ER tablet Take 1,000 mg by mouth nightly.  gabapentin (NEURONTIN) 300 mg capsule Take 300 mg by mouth nightly.  aspirin delayed-release 81 mg tablet Take 81 mg by mouth daily.  folic acid (FOLVITE) 1 mg tablet Take 1 mg by mouth daily.  methotrexate (RHEUMATREX) 2.5 mg tablet Take 20 mg by mouth every Wednesday.  omeprazole (PRILOSEC) 20 mg capsule Take 20 mg by mouth Daily (before breakfast).  atorvastatin (LIPITOR) 20 mg tablet Take 20 mg by mouth daily.  Cholecalciferol, Vitamin D3, (VITAMIN D) 1,000 unit Cap Take 2 Tabs by mouth daily.  losartan-hydrochlorothiazide (HYZAAR) 100-25 mg per tablet Take 1 Tab by mouth daily.  potassium chloride SR (KLOR-CON 10) 10 mEq tablet Take 10 mEq by mouth two (2) times a day. No current facility-administered medications for this encounter. Objective: 
Visit Vitals /74 (BP 1 Location: Left arm, BP Patient Position: Sitting) Pulse 76 Temp 98.3 °F (36.8 °C) Resp 16 Vascular: B/L LE 
DP 0/4; PT 0/4 
capillary fill time brisk, pitting edema is present, skin temperature is cool, varicosities are present. Dermatological: 
 
Wound: RLE Location: lower leg Measurements: per RN note Margins: minimal local erythema/edema Drainage: moderate sanguinous Odor: none Wound base: granular Lymphangitic streaking? No. 
Undermining? No. 
Sinus tracts? No. 
Exposed bone? No. 
Subcutaneous crepitation on palpation? No. 
 
Nails are thickened, elongated, discolored, painful to palpation, 2mm thick, with subungual debris. Skin is dry and scaly, no hemosiderin deposition. There is no maceration of the interspaces of the feet b/l. Neurological: DTR are present, protective sensation per 5.07 [place] Amrita monofilament is present, patient is AAOx3, mood is normal. Epicritic sensation is intact. Orthopedic: B/L LE are symmetric, ROM of ankle, STJ, 1st MTPJ is limited, MMT 5 out of 5 for B/L LE. Constitutional: Pt is a well developed, average middle aged AAF. Lymphatics: negative tenderness to palpation of neck/axillary/inguinal nodes. Imaging / Labs / Cx / Px: 
CT RLE 4-2-19 1. Soft tissue wound extending into the subcutaneous tissues medial to the mid 
distal tibia.  Hyperdensity within the wound is likely related to packing 
material. There is soft tissue thickening surrounding this likely related to 
 granulation tissue. No evidence of a focal drainable fluid collection. Mild 
subcutaneous edema surrounding the lower extremity below the knee is nonspecific 
and may be related to cellulitis or patient volume fluid status. 2. 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 There is 
loosening of the femoral stem. Mild lucency at the bone cement interface 
associated with the tibial tray may be related to mild loosening as well. 3. Significant medial tilting and subluxation of the patella.

## 2019-04-30 ENCOUNTER — HOSPITAL ENCOUNTER (OUTPATIENT)
Dept: WOUND CARE | Age: 69
Discharge: HOME | End: 2019-04-30
Payer: MEDICARE

## 2019-04-30 VITALS
TEMPERATURE: 97.5 F | RESPIRATION RATE: 15 BRPM | DIASTOLIC BLOOD PRESSURE: 72 MMHG | SYSTOLIC BLOOD PRESSURE: 124 MMHG | HEART RATE: 77 BPM

## 2019-04-30 PROCEDURE — 99214 OFFICE O/P EST MOD 30 MIN: CPT

## 2019-04-30 PROCEDURE — 74011000250 HC RX REV CODE- 250: Performed by: PODIATRIST

## 2019-04-30 RX ADMIN — Medication: at 13:16

## 2019-04-30 NOTE — WOUND CARE
Amadeo Granger DPM - [place]. Rigo Salcedo, [place] 8 Note Assessment/Plan: 
Hematoma RLE Contusion RLE, subsequent encounter - Pt evaluated and treated. - Minimal to no undermining at this time, painted with GV will continue with [place] and tubigrip. Continues to decrease in size and depth. No s/s acute infection. No remaining fluid collection on RLE CT  
- Pt has enough pain medication - F/U 2 wk. Subjective: 
Pt complains of wound to RLE. Previous tx include bedside I&D, PO abx. Negative for fever, chills, nausea, vomiting, chest pain, shortness of breath. Notes wound doing better. HPI: 70 y.o. F presents c/o pain / swelling to RLE. Had been told she had hematoma there and underwent bedside I&D to RT leg at [facility] ED and was given PO abx, but site has remained painful and swollen so she presented here for further assessment. Issue started 3-6-19 when she fell down stairs and banged her leg. History: 
Right Lower Leg No Known Allergies Family History Problem Relation Age of Onset  Hypertension Mother  Diabetes Mother  Heart Disease Mother MI  
 Heart Attack Mother  Heart Disease Father MI  
 Heart Attack Father  Heart Surgery Sister STENT, VALVE SURGERY  
 Hypertension Sister  Diabetes Sister  Heart Attack Brother  Diabetes Brother  Diabetes Brother  Hypertension Brother  Diabetes Sister  Hypertension Sister  Elevated Lipids Sister  Heart Surgery Sister  Anesth Problems Neg Hx Past Medical History:  
Diagnosis Date  Arthritis  GERD (gastroesophageal reflux disease)  Hypercholesterolemia  Hypertension  Ill-defined condition   
 cellulitis  Other ill-defined conditions(017.61) MGUS (MONOCLONAL GAMMOPATHY OF UNCLEAR SIGNIFICANCE)  Prediabetes  Seizures ([facility] [address] 2016 PER NEUROLOGIST ABSENCE SEIZURES, patient states  Stroke [facility]) 2006 TIA  Syncope and collapse   
 loop recorder placed 4/2016 (syncope of unknown etiology) Past Surgical History:  
Procedure Laterality Date  HX APPENDECTOMY    
 REMOVED DURING HYSTERECTOMY  HX COLONOSCOPY    
 HX DILATION AND CURETTAGE    
 HX HYSTERECTOMY  1996  HX IMPLANTABLE LOOP RECORDER  04/29/2016  HX KNEE ARTHROSCOPY  12/17/2009/ AND 2008 RIGHT KNEE  
 HX KNEE REPLACEMENT Right 2013 2016  HX ORTHOPAEDIC  9/09 AND  5/24/2011 RIGHT TOTAL KNEE, L-knee replacement  HX ORTHOPAEDIC  1/31/2011   10/24/2011 REMOVED KNEE REPLACEMENT  
 HX ORTHOPAEDIC Left 09/05/2017 HAND  HX ROTATOR CUFF REPAIR Right 2008  HX SHOULDER ARTHROSCOPY  2003 RIGHT Social History Tobacco Use  Smoking status: Never Smoker  Smokeless tobacco: Never Used Substance Use Topics  Alcohol use: No  
  Comment: RARE- WINE 1/YR Social History Substance and Sexual Activity Alcohol Use No  
 Comment: RARE- WINE 1/YR Social History Substance and Sexual Activity Drug Use No  
  
Social History Tobacco Use Smoking Status Never Smoker Smokeless Tobacco Never Used Current Outpatient Medications Medication Sig  
 amoxicillin-clavulanate (AUGMENTIN) 875-125 mg per tablet Take 1 Tab by mouth two (2) times a day.  acetaminophen-codeine (TYLENOL-CODEINE #3) 300-30 mg per tablet Take 1 Tab by mouth every eight (8) hours as needed for Pain.  levETIRAcetam (KEPPRA XR) 500 mg ER tablet Take 1,000 mg by mouth nightly.  gabapentin (NEURONTIN) 300 mg capsule Take 300 mg by mouth nightly.  aspirin delayed-release 81 mg tablet Take 81 mg by mouth daily.  folic acid (FOLVITE) 1 mg tablet Take 1 mg by mouth daily.   
 methotrexate (RHEUMATREX) 2.5 mg tablet Take 20 mg by mouth every Wednesday.  omeprazole (PRILOSEC) 20 mg capsule Take 20 mg by mouth Daily (before breakfast).  atorvastatin (LIPITOR) 20 mg tablet Take 20 mg by mouth daily.  Cholecalciferol, Vitamin D3, (VITAMIN D) 1,000 unit Cap Take 2 Tabs by mouth daily.  losartan-hydrochlorothiazide (HYZAAR) 100-25 mg per tablet Take 1 Tab by mouth daily.  potassium chloride SR (KLOR-CON 10) 10 mEq tablet Take 10 mEq by mouth two (2) times a day. No current facility-administered medications for this encounter. Objective: 
Visit Vitals /72 (BP 1 Location: Right arm, BP Patient Position: Sitting) Pulse 77 Temp 97.5 °F (36.4 °C) Resp 15 Vascular: B/L LE 
DP 0/4; PT 0/4 
capillary fill time brisk, pitting edema is present, skin temperature is cool, varicosities are present. Dermatological: 
 
Wound: RLE Location: lower leg Measurements: per RN note Margins: minimal local erythema/edema Drainage: moderate sanguinous Odor: none Wound base: granular Lymphangitic streaking? No. 
Undermining? Minimal at 2-3 o'clock, 3 mm Sinus tracts? No. 
Exposed bone? No. 
Subcutaneous crepitation on palpation? No. 
 
Nails are thickened, elongated, discolored, painful to palpation, 2mm thick, with subungual debris. Skin is dry and scaly, no hemosiderin deposition. There is no maceration of the interspaces of the feet b/l. Neurological: DTR are present, protective sensation per 5.07 [place] Amrita monofilament is present, patient is AAOx3, mood is normal. Epicritic sensation is intact. Orthopedic: B/L LE are symmetric, ROM of ankle, STJ, 1st MTPJ is limited, MMT 5 out of 5 for B/L LE. Constitutional: Pt is a well developed, average middle aged AAF. Lymphatics: negative tenderness to palpation of neck/axillary/inguinal nodes. Imaging / Labs / Cx / Px: 
CT RLE 4-2-19 1. Soft tissue wound extending into the subcutaneous tissues medial to the mid distal tibia. Hyperdensity within the wound is likely related to packing 
material. There is soft tissue thickening surrounding this likely related to 
granulation tissue. No evidence of a focal drainable fluid collection. Mild 
subcutaneous edema surrounding the lower extremity below the knee is nonspecific 
and may be related to cellulitis or patient volume fluid status. 2. 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 There is 
loosening of the femoral stem. Mild lucency at the bone cement interface 
associated with the tibial tray may be related to mild loosening as well. 3. Significant medial tilting and subluxation of the patella.

## 2019-05-22 ENCOUNTER — HOSPITAL ENCOUNTER (OUTPATIENT)
Dept: WOUND CARE | Age: 69
Discharge: HOME | End: 2019-05-22
Payer: MEDICARE

## 2019-05-22 VITALS
DIASTOLIC BLOOD PRESSURE: 78 MMHG | BODY MASS INDEX: 29.57 KG/M2 | OXYGEN SATURATION: 16 % | SYSTOLIC BLOOD PRESSURE: 150 MMHG | HEIGHT: 66 IN | WEIGHT: 184 LBS | HEART RATE: 69 BPM | TEMPERATURE: 97.4 F

## 2019-05-22 PROCEDURE — 11042 DBRDMT SUBQ TIS 1ST 20SQCM/<: CPT

## 2019-05-22 PROCEDURE — 74011000250 HC RX REV CODE- 250: Performed by: PODIATRIST

## 2019-05-22 RX ADMIN — Medication: at 08:41

## 2019-05-22 NOTE — WOUND CARE
05/22/19 8206 Wound Leg Lower Right Date First Assessed/Time First Assessed: 03/26/19 1400   POA: Yes  Location: Leg Lower  Orientation: Right Wound Length (cm) 0.5 cm Wound Width (cm) 0.4 cm Wound Depth (cm) 0.3 Wound Surface area (cm^2) 0.2 cm^2 Change in Wound Size % 44.44 Tissue Type Pink Direction of Undermining 12 o'clock;1 o'clock Depth of Undermining (cm) 0.4 Drainage Amount  Small Drainage Color Serosanguinous Wound Odor None Periwound Skin Condition Intact Cleansing and Cleansing Agents  Normal saline Visit Vitals /78 Pulse 69 Temp 97.4 °F (36.3 °C) Ht 5' 6\" (1.676 m) Wt 83.5 kg (184 lb) SpO2 (!) 16% BMI 29.70 kg/m²

## 2019-05-22 NOTE — WOUND CARE
Tyron Heath, DPM - [place] [facility] [place]keon. Tran Melton, [place] 8 Note Assessment/Plan: 
Hematoma RLE Contusion RLE, subsequent encounter - Pt evaluated and treated. - Wound has filled in nicely, no probe or sinuses. Will switch to BW2D to try and dry out remaining ulceration along with tubigrip. 
- No s/s acute infection. No remaining fluid collection on RLE CT  
- F/U 2 wk. Subjective: 
Pt complains of wound to RLE. Previous tx include bedside I&D, PO abx. Negative for fever, chills, nausea, vomiting, chest pain, shortness of breath. Notes wound doing better. HPI: 70 y.o. F presents c/o pain / swelling to RLE. Had been told she had hematoma there and underwent bedside I&D to RT leg at [facility] ED and was given PO abx, but site has remained painful and swollen so she presented here for further assessment. Issue started 3-6-19 when she fell down stairs and banged her leg. History: 
Right Lower Leg No Known Allergies Family History Problem Relation Age of Onset  Hypertension Mother  Diabetes Mother  Heart Disease Mother MI  
 Heart Attack Mother  Heart Disease Father MI  
 Heart Attack Father  Heart Surgery Sister STENT, VALVE SURGERY  
 Hypertension Sister  Diabetes Sister  Heart Attack Brother  Diabetes Brother  Diabetes Brother  Hypertension Brother  Diabetes Sister  Hypertension Sister  Elevated Lipids Sister  Heart Surgery Sister  Anesth Problems Neg Hx Past Medical History:  
Diagnosis Date  Arthritis  GERD (gastroesophageal reflux disease)  Hypercholesterolemia  Hypertension  Ill-defined condition   
 cellulitis  Other ill-defined conditions(031.32) MGUS (MONOCLONAL GAMMOPATHY OF UNCLEAR SIGNIFICANCE)  Prediabetes  Seizures ([facility] [address] 2016 PER NEUROLOGIST ABSENCE SEIZURES, patient states  Stroke [facility]) 2006 TIA  Syncope and collapse   
 loop recorder placed 4/2016 (syncope of unknown etiology) Past Surgical History:  
Procedure Laterality Date  HX APPENDECTOMY    
 REMOVED DURING HYSTERECTOMY  HX COLONOSCOPY    
 HX DILATION AND CURETTAGE    
 HX HYSTERECTOMY  1996  HX IMPLANTABLE LOOP RECORDER  04/29/2016  HX KNEE ARTHROSCOPY  12/17/2009/ AND 2008 RIGHT KNEE  
 HX KNEE REPLACEMENT Right 2013 2016  HX ORTHOPAEDIC  9/09 AND  5/24/2011 RIGHT TOTAL KNEE, L-knee replacement  HX ORTHOPAEDIC  1/31/2011   10/24/2011 REMOVED KNEE REPLACEMENT  
 HX ORTHOPAEDIC Left 09/05/2017 HAND  HX ROTATOR CUFF REPAIR Right 2008  HX SHOULDER ARTHROSCOPY  2003 RIGHT Social History Tobacco Use  Smoking status: Never Smoker  Smokeless tobacco: Never Used Substance Use Topics  Alcohol use: No  
  Comment: RARE- WINE 1/YR Social History Substance and Sexual Activity Alcohol Use No  
 Comment: RARE- WINE 1/YR Social History Substance and Sexual Activity Drug Use No  
  
Social History Tobacco Use Smoking Status Never Smoker Smokeless Tobacco Never Used Current Outpatient Medications Medication Sig  
 amoxicillin-clavulanate (AUGMENTIN) 875-125 mg per tablet Take 1 Tab by mouth two (2) times a day.  acetaminophen-codeine (TYLENOL-CODEINE #3) 300-30 mg per tablet Take 1 Tab by mouth every eight (8) hours as needed for Pain.  levETIRAcetam (KEPPRA XR) 500 mg ER tablet Take 1,000 mg by mouth nightly.  gabapentin (NEURONTIN) 300 mg capsule Take 300 mg by mouth nightly.  aspirin delayed-release 81 mg tablet Take 81 mg by mouth daily.  folic acid (FOLVITE) 1 mg tablet Take 1 mg by mouth daily.  methotrexate (RHEUMATREX) 2.5 mg tablet Take 20 mg by mouth every Wednesday.  omeprazole (PRILOSEC) 20 mg capsule Take 20 mg by mouth Daily (before breakfast).  atorvastatin (LIPITOR) 20 mg tablet Take 20 mg by mouth daily.  Cholecalciferol, Vitamin D3, (VITAMIN D) 1,000 unit Cap Take 2 Tabs by mouth daily.  losartan-hydrochlorothiazide (HYZAAR) 100-25 mg per tablet Take 1 Tab by mouth daily.  potassium chloride SR (KLOR-CON 10) 10 mEq tablet Take 10 mEq by mouth two (2) times a day. No current facility-administered medications for this encounter. Objective: 
Visit Vitals /78 Pulse 69 Temp 97.4 °F (36.3 °C) Ht 5' 6\" (1.676 m) Wt 83.5 kg (184 lb) SpO2 (!) 16% BMI 29.70 kg/m² Vascular: B/L LE 
DP 0/4; PT 0/4 
capillary fill time brisk, pitting edema is present, skin temperature is cool, varicosities are present. Dermatological: 
 
Wound: RLE Location: lower leg Measurements: per RN note Margins: minimal local erythema/edema Drainage: moderate sanguinous Odor: none Wound base: granular Lymphangitic streaking? No. 
Undermining? Minimal at 2-3 o'clock, 3 mm Sinus tracts? No. 
Exposed bone? No. 
Subcutaneous crepitation on palpation? No. 
 
Nails are thickened, elongated, discolored, painful to palpation, 2mm thick, with subungual debris. Skin is dry and scaly, no hemosiderin deposition. There is no maceration of the interspaces of the feet b/l. Neurological: DTR are present, protective sensation per 5.07 [place] Amrita monofilament is present, patient is AAOx3, mood is normal. Epicritic sensation is intact. Orthopedic: B/L LE are symmetric, ROM of ankle, STJ, 1st MTPJ is limited, MMT 5 out of 5 for B/L LE. Constitutional: Pt is a well developed, average middle aged AAF. Lymphatics: negative tenderness to palpation of neck/axillary/inguinal nodes. Imaging / Labs / Cx / Px: 
CT RLE 4-2-19 1. Soft tissue wound extending into the subcutaneous tissues medial to the mid distal tibia. Hyperdensity within the wound is likely related to packing 
material. There is soft tissue thickening surrounding this likely related to 
granulation tissue. No evidence of a focal drainable fluid collection. Mild 
subcutaneous edema surrounding the lower extremity below the knee is nonspecific 
and may be related to cellulitis or patient volume fluid status. 2. 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 There is 
loosening of the femoral stem. Mild lucency at the bone cement interface 
associated with the tibial tray may be related to mild loosening as well. 3. Significant medial tilting and subluxation of the patella.

## 2019-06-04 ENCOUNTER — HOSPITAL ENCOUNTER (OUTPATIENT)
Dept: WOUND CARE | Age: 69
Discharge: HOME | End: 2019-06-04
Payer: MEDICARE

## 2019-06-04 VITALS
HEART RATE: 83 BPM | SYSTOLIC BLOOD PRESSURE: 131 MMHG | OXYGEN SATURATION: 16 % | TEMPERATURE: 97.9 F | DIASTOLIC BLOOD PRESSURE: 71 MMHG

## 2019-06-04 PROCEDURE — 99213 OFFICE O/P EST LOW 20 MIN: CPT

## 2019-06-04 PROCEDURE — 74011000250 HC RX REV CODE- 250: Performed by: PODIATRIST

## 2019-06-04 RX ADMIN — Medication: at 13:15

## 2019-06-04 NOTE — WOUND CARE
Simba King DPM - [place] Noriega. Helen Morton, [address] - Progress Note     Assessment/Plan:  Hematoma RLE  Contusion RLE, subsequent encounter    - Pt evaluated and treated. - Wound has filled in, fully healed. - Will D/C from [facility] at this time. Subjective:  Pt complains of wound to RLE. Thinks it is healed. Negative for fever, chills, nausea, vomiting, chest pain, shortness of breath. HPI: 70 y.o. F presents c/o pain / swelling to RLE. Had been told she had hematoma there and underwent bedside I&D to RT leg at [facility] ED and was given PO abx, but site has remained painful and swollen so she presented here for further assessment. Issue started 3-6-19 when she fell down stairs and banged her leg.     History:  Right Lower Leg  No Known Allergies  Family History   Problem Relation Age of Onset    Hypertension Mother     Diabetes Mother     Heart Disease Mother         MI    Heart Attack Mother     Heart Disease Father         MI    Heart Attack Father     Heart Surgery Sister         STENT, VALVE SURGERY    Hypertension Sister     Diabetes Sister     Heart Attack Brother     Diabetes Brother     Diabetes Brother     Hypertension Brother     Diabetes Sister     Hypertension Sister     Elevated Lipids Sister     Heart Surgery Sister     Anesth Problems Neg Hx       Past Medical History:   Diagnosis Date    Arthritis     GERD (gastroesophageal reflux disease)     Hypercholesterolemia     Hypertension     Ill-defined condition     cellulitis    Other ill-defined conditions(799.89)     MGUS (MONOCLONAL GAMMOPATHY OF UNCLEAR SIGNIFICANCE)    Prediabetes     Seizures ([facility] [address] 2016    PER NEUROLOGIST ABSENCE SEIZURES, patient states    Stroke [facility]) 2006    TIA    Syncope and collapse     loop recorder placed 4/2016 (syncope of unknown etiology) Past Surgical History:   Procedure Laterality Date    HX APPENDECTOMY      REMOVED DURING HYSTERECTOMY    HX COLONOSCOPY      HX DILATION AND CURETTAGE      HX HYSTERECTOMY  1996    HX IMPLANTABLE LOOP RECORDER  04/29/2016    HX KNEE ARTHROSCOPY  12/17/2009/ AND 2008    RIGHT KNEE    HX KNEE REPLACEMENT Right 2013 2016    HX ORTHOPAEDIC  9/09 AND  5/24/2011    RIGHT TOTAL KNEE, L-knee replacement    HX ORTHOPAEDIC  1/31/2011   10/24/2011    REMOVED KNEE REPLACEMENT    HX ORTHOPAEDIC Left 09/05/2017    HAND    HX ROTATOR CUFF REPAIR Right 2008    HX SHOULDER ARTHROSCOPY  2003    RIGHT     Social History     Tobacco Use    Smoking status: Never Smoker    Smokeless tobacco: Never Used   Substance Use Topics    Alcohol use: No     Comment: RARE- WINE 1/YR       Social History     Substance and Sexual Activity   Alcohol Use No    Comment: RARE- WINE 1/YR     Social History     Substance and Sexual Activity   Drug Use No      Social History     Tobacco Use   Smoking Status Never Smoker   Smokeless Tobacco Never Used     Current Outpatient Medications   Medication Sig    amoxicillin-clavulanate (AUGMENTIN) 875-125 mg per tablet Take 1 Tab by mouth two (2) times a day.  acetaminophen-codeine (TYLENOL-CODEINE #3) 300-30 mg per tablet Take 1 Tab by mouth every eight (8) hours as needed for Pain.  levETIRAcetam (KEPPRA XR) 500 mg ER tablet Take 1,000 mg by mouth nightly.  gabapentin (NEURONTIN) 300 mg capsule Take 300 mg by mouth nightly.  aspirin delayed-release 81 mg tablet Take 81 mg by mouth daily.  folic acid (FOLVITE) 1 mg tablet Take 1 mg by mouth daily.  methotrexate (RHEUMATREX) 2.5 mg tablet Take 20 mg by mouth every Wednesday.  omeprazole (PRILOSEC) 20 mg capsule Take 20 mg by mouth Daily (before breakfast).  atorvastatin (LIPITOR) 20 mg tablet Take 20 mg by mouth daily.  Cholecalciferol, Vitamin D3, (VITAMIN D) 1,000 unit Cap Take 2 Tabs by mouth daily.     losartan-hydrochlorothiazide (HYZAAR) 100-25 mg per tablet Take 1 Tab by mouth daily.  potassium chloride SR (KLOR-CON 10) 10 mEq tablet Take 10 mEq by mouth two (2) times a day. No current facility-administered medications for this encounter. Objective:  Visit Vitals  /71   Pulse 83   Temp 97.9 °F (36.6 °C)   SpO2 (!) 16%       Vascular:  B/L LE  DP 0/4; PT 0/4  capillary fill time brisk, pitting edema is present, skin temperature is cool, varicosities are present. Dermatological:    Wound: RLE  Location: lower leg  Measurements: healed    Nails are thickened, elongated, discolored, painful to palpation, 2mm thick, with subungual debris. Skin is dry and scaly, no hemosiderin deposition. There is no maceration of the interspaces of the feet b/l. Neurological:  DTR are present, protective sensation per 5.07 [place] Amrita monofilament is present, patient is AAOx3, mood is normal. Epicritic sensation is intact. Orthopedic:  B/L LE are symmetric, ROM of ankle, STJ, 1st MTPJ is limited, MMT 5 out of 5 for B/L LE. Constitutional: Pt is a well developed, average middle aged AAF. Lymphatics: negative tenderness to palpation of neck/axillary/inguinal nodes. Imaging / Labs / Cx / Px:  CT RLE 4-2-19  1. Soft tissue wound extending into the subcutaneous tissues medial to the mid  distal tibia. Hyperdensity within the wound is likely related to packing  material. There is soft tissue thickening surrounding this likely related to  granulation tissue. No evidence of a focal drainable fluid collection. Mild  subcutaneous edema surrounding the lower extremity below the knee is nonspecific  and may be related to cellulitis or patient volume fluid status. 2. 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 There is  loosening of the femoral stem. Mild lucency at the bone cement interface  associated with the tibial tray may be related to mild loosening as well. 3. Significant medial tilting and subluxation of the patella.

## 2019-06-04 NOTE — WOUND CARE
06/04/19 1312   Wound Leg Lower Right   Date First Assessed/Time First Assessed: 03/26/19 1400   POA: Yes  Location: Leg Lower  Orientation: Right   Dressing Status  Clean, dry, and intact   Wound Length (cm) 0.1 cm   Wound Width (cm) 0.1 cm   Wound Depth (cm) 0.1   Wound Surface area (cm^2) 0.01 cm^2   Change in Wound Size % 97.22   Tissue Type   (scab)   Drainage Amount  None   Wound Odor None   Cleansing and Cleansing Agents  Normal saline   Blood pressure 131/71, pulse 83, temperature 97.9 °F (36.6 °C), SpO2 (!) 16 %.     No picture available, equipment not working Problem: Mobility Impaired (Adult and Pediatric)  Goal: *Acute Goals and Plan of Care (Insert Text)  Physical Therapy Goals  Initiated 7/2/2017  1. Patient will move from supine to sit and sit to supine in bed with minimal assistance within 7 day(s). 2. Patient will transfer from bed to chair and chair to bed with moderate assistance using the least restrictive device within 7 day(s). 3. Patient will perform sit to stand with minimal assistance within 7 day(s). 4. Patient will ambulate with moderate assistance for 15 feet with the least restrictive device within 7 day(s). 5. Pt will increase Rice Balance score by 4-5 points to decrease risk of falls within 7 days. PHYSICAL THERAPY TREATMENT  Patient: William Kay (67 y.o. female)  Date: 7/5/2017  Diagnosis: Acute CVA (cerebrovascular accident) [facility]) Cerebrovascular accident (CVA) due to thrombosis of left middle cerebral artery [facility])       Precautions: Fall  Chart, physical therapy assessment, plan of care and goals were reviewed. ASSESSMENT:  Pt was sitting EOB with sitter. Pt was agreeable to mobilize and OOB task. Pt required assistance of 2 to mobilize. Pt had decrease ability to advance right LE. Pt required facilitation to weightshift the left to off weight. Pt static standing required mod A for UE support and tactile cue for LE extension. Pt pleasently confused. Noted pt was wearing money belt under gown. Notified sitter who was unaware. Pt's purse beside pt. PTA pt was independent with mobility and is currently below baseline. Pt may benefit from inpt rehab at discharge   Progression toward goals:  [X]      Improving appropriately and progressing toward goals  [ ]      Improving slowly and progressing toward goals  [ ]      Not making progress toward goals and plan of care will be adjusted       PLAN:  Patient continues to benefit from skilled intervention to address the above impairments.   Continue treatment per established plan of care.  Discharge Recommendations:  Inpatient Rehab  Further Equipment Recommendations for Discharge:  TBD       SUBJECTIVE:   Patient stated I am ready to go.       OBJECTIVE DATA SUMMARY:   Critical Behavior:  Neurologic State: Alert, Restless  Orientation Level: Oriented X4  Cognition: Impulsive, Impaired decision making, Decreased attention/concentration, Decreased command following, Poor safety awareness  Safety/Judgement: Awareness of environment  Functional Mobility Training:  Bed Mobility:                                Transfers:  Sit to Stand: Moderate assistance;Assist x2  Stand to Sit: Moderate assistance;Assist x2                             Balance:  Sitting: Impaired  Sitting - Static: Fair (occasional)  Standing: Impaired  Standing - Static: Poor  Standing - Dynamic : Poor  Ambulation/Gait Training:  Distance (ft): 15 Feet (ft)  Assistive Device: Gait belt  Ambulation - Level of Assistance: Moderate assistance;Assist x2        Gait Abnormalities: Decreased step clearance; Hemiplegic        Base of Support: Center of gravity altered;Narrowed     Speed/Soraya: Slow  Step Length: Left shortened;Right shortened                               Stairs:              Neuro Re-Education:     Therapeutic Exercises:      Pain:  Pain Scale 1: Numeric (0 - 10)  Pain Intensity 1: 0              Activity Tolerance:   Limited   Please refer to the flowsheet for vital signs taken during this treatment.   After treatment:   [X] Patient left in no apparent distress sitting up in chair  [ ] Patient left in no apparent distress in bed  [X] Call bell left within reach  [X] Nursing notified  [X] sitter present  [ ] Bed alarm activated      COMMUNICATION/COLLABORATION:   The patients plan of care was discussed with: Registered Nurse     Shira Guzman PTA   Time Calculation: 27 mins

## 2019-09-06 ENCOUNTER — APPOINTMENT (OUTPATIENT)
Dept: CT IMAGING | Age: 69
End: 2019-09-06
Attending: STUDENT IN AN ORGANIZED HEALTH CARE EDUCATION/TRAINING PROGRAM
Payer: MEDICARE

## 2019-09-06 ENCOUNTER — HOSPITAL ENCOUNTER (EMERGENCY)
Age: 69
Discharge: HOME | End: 2019-09-06
Attending: STUDENT IN AN ORGANIZED HEALTH CARE EDUCATION/TRAINING PROGRAM
Payer: MEDICARE

## 2019-09-06 VITALS
SYSTOLIC BLOOD PRESSURE: 138 MMHG | RESPIRATION RATE: 18 BRPM | BODY MASS INDEX: 28.25 KG/M2 | WEIGHT: 180 LBS | TEMPERATURE: 98.9 F | OXYGEN SATURATION: 92 % | HEART RATE: 90 BPM | HEIGHT: 67 IN | DIASTOLIC BLOOD PRESSURE: 62 MMHG

## 2019-09-06 DIAGNOSIS — I88.0 MESENTERIC ADENITIS: Primary | ICD-10-CM

## 2019-09-06 LAB
ALBUMIN SERPL-MCNC: 3 G/DL (ref 3.5–5)
ALBUMIN/GLOB SERPL: 0.6 {RATIO} (ref 1.1–2.2)
ALP SERPL-CCNC: 120 U/L (ref 45–117)
ALT SERPL-CCNC: 18 U/L (ref 12–78)
ANION GAP SERPL CALC-SCNC: 4 MMOL/L (ref 5–15)
APPEARANCE UR: CLEAR
AST SERPL-CCNC: 34 U/L (ref 15–37)
BACTERIA URNS QL MICRO: NEGATIVE /HPF
BASOPHILS # BLD: 0 K/UL (ref 0–0.1)
BASOPHILS NFR BLD: 0 % (ref 0–1)
BILIRUB SERPL-MCNC: 0.7 MG/DL (ref 0.2–1)
BILIRUB UR QL CFM: NEGATIVE
BUN SERPL-MCNC: 18 MG/DL (ref 6–20)
BUN/CREAT SERPL: 15 (ref 12–20)
CALCIUM SERPL-MCNC: 8.9 MG/DL (ref 8.5–10.1)
CHLORIDE SERPL-SCNC: 107 MMOL/L (ref 97–108)
CO2 SERPL-SCNC: 27 MMOL/L (ref 21–32)
COLOR UR: ABNORMAL
COMMENT, HOLDF: NORMAL
CREAT SERPL-MCNC: 1.18 MG/DL (ref 0.55–1.02)
DIFFERENTIAL METHOD BLD: ABNORMAL
EOSINOPHIL # BLD: 0 K/UL (ref 0–0.4)
EOSINOPHIL NFR BLD: 0 % (ref 0–7)
EPITH CASTS URNS QL MICRO: ABNORMAL /LPF
ERYTHROCYTE [DISTWIDTH] IN BLOOD BY AUTOMATED COUNT: 14.6 % (ref 11.5–14.5)
GLOBULIN SER CALC-MCNC: 4.8 G/DL (ref 2–4)
GLUCOSE SERPL-MCNC: 87 MG/DL (ref 65–100)
GLUCOSE UR STRIP.AUTO-MCNC: NEGATIVE MG/DL
HCT VFR BLD AUTO: 34.1 % (ref 35–47)
HGB BLD-MCNC: 10.6 G/DL (ref 11.5–16)
HGB UR QL STRIP: NEGATIVE
IMM GRANULOCYTES # BLD AUTO: 0.1 K/UL (ref 0–0.04)
IMM GRANULOCYTES NFR BLD AUTO: 1 % (ref 0–0.5)
KETONES UR QL STRIP.AUTO: ABNORMAL MG/DL
LEUKOCYTE ESTERASE UR QL STRIP.AUTO: ABNORMAL
LIPASE SERPL-CCNC: 37 U/L (ref 73–393)
LYMPHOCYTES # BLD: 1.4 K/UL (ref 0.8–3.5)
LYMPHOCYTES NFR BLD: 10 % (ref 12–49)
MCH RBC QN AUTO: 29.2 PG (ref 26–34)
MCHC RBC AUTO-ENTMCNC: 31.1 G/DL (ref 30–36.5)
MCV RBC AUTO: 93.9 FL (ref 80–99)
MONOCYTES # BLD: 0.2 K/UL (ref 0–1)
MONOCYTES NFR BLD: 2 % (ref 5–13)
MUCOUS THREADS URNS QL MICRO: ABNORMAL /LPF
NEUTS SEG # BLD: 11.4 K/UL (ref 1.8–8)
NEUTS SEG NFR BLD: 87 % (ref 32–75)
NITRITE UR QL STRIP.AUTO: NEGATIVE
NRBC # BLD: 0 K/UL (ref 0–0.01)
NRBC BLD-RTO: 0 PER 100 WBC
PH UR STRIP: 6 [PH] (ref 5–8)
PLATELET # BLD AUTO: 245 K/UL (ref 150–400)
PMV BLD AUTO: 10.1 FL (ref 8.9–12.9)
POTASSIUM SERPL-SCNC: 4.4 MMOL/L (ref 3.5–5.1)
PROT SERPL-MCNC: 7.8 G/DL (ref 6.4–8.2)
PROT UR STRIP-MCNC: 30 MG/DL
RBC # BLD AUTO: 3.63 M/UL (ref 3.8–5.2)
RBC #/AREA URNS HPF: ABNORMAL /HPF (ref 0–5)
SAMPLES BEING HELD,HOLD: NORMAL
SODIUM SERPL-SCNC: 138 MMOL/L (ref 136–145)
SP GR UR REFRACTOMETRY: 1.03 (ref 1–1.03)
UA: UC IF INDICATED,UAUC: ABNORMAL
UROBILINOGEN UR QL STRIP.AUTO: 1 EU/DL (ref 0.2–1)
WBC # BLD AUTO: 13.1 K/UL (ref 3.6–11)
WBC URNS QL MICRO: ABNORMAL /HPF (ref 0–4)

## 2019-09-06 PROCEDURE — 99284 EMERGENCY DEPT VISIT MOD MDM: CPT

## 2019-09-06 PROCEDURE — 80053 COMPREHEN METABOLIC PANEL: CPT

## 2019-09-06 PROCEDURE — 36415 COLL VENOUS BLD VENIPUNCTURE: CPT

## 2019-09-06 PROCEDURE — 93005 ELECTROCARDIOGRAM TRACING: CPT

## 2019-09-06 PROCEDURE — 81001 URINALYSIS AUTO W/SCOPE: CPT

## 2019-09-06 PROCEDURE — 74011250636 HC RX REV CODE- 250/636: Performed by: STUDENT IN AN ORGANIZED HEALTH CARE EDUCATION/TRAINING PROGRAM

## 2019-09-06 PROCEDURE — 85025 COMPLETE CBC W/AUTO DIFF WBC: CPT

## 2019-09-06 PROCEDURE — 96374 THER/PROPH/DIAG INJ IV PUSH: CPT

## 2019-09-06 PROCEDURE — 74176 CT ABD & PELVIS W/O CONTRAST: CPT

## 2019-09-06 PROCEDURE — 96365 THER/PROPH/DIAG IV INF INIT: CPT

## 2019-09-06 PROCEDURE — 96375 TX/PRO/DX INJ NEW DRUG ADDON: CPT

## 2019-09-06 PROCEDURE — 83690 ASSAY OF LIPASE: CPT

## 2019-09-06 PROCEDURE — 74011000258 HC RX REV CODE- 258: Performed by: STUDENT IN AN ORGANIZED HEALTH CARE EDUCATION/TRAINING PROGRAM

## 2019-09-06 PROCEDURE — 87040 BLOOD CULTURE FOR BACTERIA: CPT

## 2019-09-06 RX ORDER — HYDROMORPHONE HYDROCHLORIDE 1 MG/ML
0.5 INJECTION, SOLUTION INTRAMUSCULAR; INTRAVENOUS; SUBCUTANEOUS ONCE
Status: COMPLETED | OUTPATIENT
Start: 2019-09-06 | End: 2019-09-06

## 2019-09-06 RX ORDER — ONDANSETRON 2 MG/ML
4 INJECTION INTRAMUSCULAR; INTRAVENOUS ONCE
Status: COMPLETED | OUTPATIENT
Start: 2019-09-06 | End: 2019-09-06

## 2019-09-06 RX ORDER — ONDANSETRON 4 MG/1
4 TABLET, FILM COATED ORAL
Qty: 30 TAB | Refills: 0 | Status: SHIPPED | OUTPATIENT
Start: 2019-09-06 | End: 2022-06-14

## 2019-09-06 RX ORDER — SODIUM CHLORIDE 9 MG/ML
125 INJECTION, SOLUTION INTRAVENOUS CONTINUOUS
Status: DISCONTINUED | OUTPATIENT
Start: 2019-09-06 | End: 2019-09-06 | Stop reason: HOSPADM

## 2019-09-06 RX ADMIN — ONDANSETRON 4 MG: 2 INJECTION INTRAMUSCULAR; INTRAVENOUS at 14:31

## 2019-09-06 RX ADMIN — SODIUM CHLORIDE 500 ML: 900 INJECTION, SOLUTION INTRAVENOUS at 14:30

## 2019-09-06 RX ADMIN — HYDROMORPHONE HYDROCHLORIDE 0.5 MG: 1 INJECTION, SOLUTION INTRAMUSCULAR; INTRAVENOUS; SUBCUTANEOUS at 14:31

## 2019-09-06 RX ADMIN — PIPERACILLIN SODIUM,TAZOBACTAM SODIUM 3.38 G: 3; .375 INJECTION, POWDER, FOR SOLUTION INTRAVENOUS at 14:32

## 2019-09-06 NOTE — ED PROVIDER NOTES
Subjective:     Cara Moeller is a 70 y.o. female with a PMHx significant for HTN, GERD, seizure, CVA, HLD, s/p hysterectomy and appendectomy who presents to the ED with complaint of worsening LLQ abdominal pain. Symptoms started about 10 days ago but worsening since yesterday. Pt reports one episode of \"dark\" emesis this morning. She describes it as aching, constant and cramping, and is 9/10 in intensity. The pain is non-radiating. Symptoms have been gradually worsening since onset. The pain does not worsen with po intake. Worst with deep breathing and movement. Alleviating factors: sitting still. Pt reports decrease po intake since yesterday because of the pain. The patient denies constipation, diarrhea, fever, headache, hematuria, melena, urinary frequency and urinary urgency. Stools have been \"mucous-like. \"  No ETOH or smoking. Has not taken her medications this morning because of the pain. Last colonoscopy: 4 years ago and was normal     Of note, pt has a his of seizure for which she takes Keppra. Last seizure was about 2 months ago. Also has an implantable loop recorder to monitor for secondary causes of seizure. This was initially placed about 3 years ago and then replaced about a month ago 8/6/2019.               Past Medical History:   Diagnosis Date    Arthritis     GERD (gastroesophageal reflux disease)     Hypercholesterolemia     Hypertension     Ill-defined condition     cellulitis    Other ill-defined conditions(799.89)     MGUS (MONOCLONAL GAMMOPATHY OF UNCLEAR SIGNIFICANCE)    Prediabetes     Seizures ([facility] [address] 2016    PER NEUROLOGIST ABSENCE SEIZURES, patient states    Stroke [facility]) 2006    TIA    Syncope and collapse     loop recorder placed 4/2016 (syncope of unknown etiology)       Past Surgical History:   Procedure Laterality Date    HX APPENDECTOMY      REMOVED DURING HYSTERECTOMY    HX COLONOSCOPY      HX DILATION AND CURETTAGE      HX HYSTERECTOMY  1996    HX IMPLANTABLE LOOP RECORDER  04/29/2016    HX KNEE ARTHROSCOPY  12/17/2009/ AND 2008    RIGHT KNEE    HX KNEE REPLACEMENT Right 2013 2016    HX ORTHOPAEDIC  9/09 AND  5/24/2011    RIGHT TOTAL KNEE, L-knee replacement    HX ORTHOPAEDIC  1/31/2011   10/24/2011    REMOVED KNEE REPLACEMENT    HX ORTHOPAEDIC Left 09/05/2017    HAND    HX ROTATOR CUFF REPAIR Right 2008    HX SHOULDER ARTHROSCOPY  2003    RIGHT         Family History:   Problem Relation Age of Onset    Hypertension Mother     Diabetes Mother     Heart Disease Mother         MI    Heart Attack Mother     Heart Disease Father         MI    Heart Attack Father     Heart Surgery Sister         STENT, VALVE SURGERY    Hypertension Sister     Diabetes Sister     Heart Attack Brother     Diabetes Brother     Diabetes Brother     Hypertension Brother     Diabetes Sister     Hypertension Sister     Elevated Lipids Sister     Heart Surgery Sister     Anesth Problems Neg Hx        Social History     Socioeconomic History    Marital status:      Spouse name: Not on file    Number of children: Not on file    Years of education: Not on file    Highest education level: Not on file   Occupational History    Not on file   Social Needs    Financial resource strain: Not on file    Food insecurity:     Worry: Not on file     Inability: Not on file    Transportation needs:     Medical: Not on file     Non-medical: Not on file   Tobacco Use    Smoking status: Never Smoker    Smokeless tobacco: Never Used   Substance and Sexual Activity    Alcohol use: No     Comment: RARE- WINE 1/YR    Drug use: No    Sexual activity: Not on file   Lifestyle    Physical activity:     Days per week: Not on file     Minutes per session: Not on file    Stress: Not on file   Relationships    Social connections:     Talks on phone: Not on file     Gets together: Not on file     Attends Rastafarian service: Not on file     Active member of club or organization: Not on file Attends meetings of clubs or organizations: Not on file     Relationship status: Not on file    Intimate partner violence:     Fear of current or ex partner: Not on file     Emotionally abused: Not on file     Physically abused: Not on file     Forced sexual activity: Not on file   Other Topics Concern     Service Not Asked    Blood Transfusions Not Asked    Caffeine Concern Not Asked    Occupational Exposure Not Asked   Strong Jesse Hazards Not Asked    Sleep Concern Not Asked    Stress Concern Not Asked    Weight Concern Not Asked    Special Diet Not Asked    Back Care Not Asked    Exercise Not Asked    Bike Helmet Not Asked   [address] Not Asked    Self-Exams Not Asked   Social History Narrative    Not on file         ALLERGIES: Patient has no known allergies. Review of Systems   Constitutional: Positive for appetite change. Negative for chills, diaphoresis and fever. HENT: Positive for rhinorrhea. Negative for congestion and sinus pain. Eyes: Negative for photophobia, pain and visual disturbance. Respiratory: Negative for cough, chest tightness and shortness of breath. Cardiovascular: Negative for chest pain, palpitations and leg swelling. Gastrointestinal: Positive for abdominal pain, nausea and vomiting. Negative for constipation and diarrhea. Genitourinary: Negative for difficulty urinating, dysuria, flank pain and hematuria. Musculoskeletal: Positive for back pain. Skin: Negative for color change, rash and wound. Neurological: Positive for seizures. Negative for dizziness, light-headedness, numbness and headaches. Vitals:    09/06/19 1031   BP: 142/67   Pulse: 93   Resp: 18   Temp: 98.9 °F (37.2 °C)   SpO2: 97%   Weight: 81.6 kg (180 lb)   Height: 5' 6.5\" (1.689 m)            Physical Exam   Constitutional: She is oriented to person, place, and time. She appears well-developed and well-nourished. No distress. HENT:   Head: Normocephalic and atraumatic. Mouth/Throat: No oropharyngeal exudate. Eyes: Pupils are equal, round, and reactive to light. EOM are normal.   Cardiovascular: Normal rate, regular rhythm and normal heart sounds. No murmur heard. Pulmonary/Chest: Effort normal and breath sounds normal. No respiratory distress. She exhibits no tenderness. Abdominal: Soft. Bowel sounds are normal. She exhibits no distension. There is tenderness in the left lower quadrant. There is guarding. There is no rebound and no tenderness at McBurney's point. Neurological: She is alert and oriented to person, place, and time. Skin: Skin is warm and dry. Capillary refill takes less than 2 seconds. MDM  Number of Diagnoses or Management Options  Diagnosis management comments: 69 yo [place] American female with a history of  HTN, GERD, seizure, CVA, HLD and s/p hysterectomy and appendectomy who presents with LLQ abd pain. Patient's symptoms are concerning for Acute diverticulitis although cannot rule out of the etiologist such as infectious colitis or ischemic colitis. - CBC shows elevated WBC.  - UA shows trace LE, otherwise unremarkable     Plan:  - Getting CT abd/pelv  - Received normal saline bolus  - Started on Zosyn  - Zofran prn for nausea and dilaudid for pain. - Will reassess following results         Procedures      - CT abd w/o contrast shows Nonspecific inflammatory process in the mesentery of the left mid  abdomen between the stomach, splenic flexure, and descending colon. There is  some thickening of the wall of the greater curvature of the stomach which may be related. Unable to get IV contrast due to poor venous access    Dx: Mesenteric Lymphadenitis:   - Discharge home with instructions to taku Aleve or Tylenol for pain. - Zofran for nausea/vomiting  - Patient stable for discharge   - Follow up with PCP      Patient discussed with Ja Maldonado (ED attending)    I personally saw and examined the patient.  I have reviewed and agree with the residents findings, including all diagnostic interpretations, and plans as written. I was present during the key portions of separately billed procedures.   Enid Chavis MD

## 2019-09-06 NOTE — PROGRESS NOTES
[facility] Dosing Services: Antimicrobial Stewardship Note    Zosyn 4.5g IV X 1 dose in ED changed to 3.375mg IV x 1 (to administer over 30 min) per [facility] antibiotic dosing protocol.       Pharmacist: Bardy Montiel Contact information: [id number]

## 2019-09-06 NOTE — DISCHARGE INSTRUCTIONS
-Please take Tylenol or Aleve prn for pain  - Prescription for Zofran prn for nausea/vomiting  -Follow up with your primary care doctor    Patient Education        Mesenteric Adenitis: Care Instructions  Your Care Instructions  Mesenteric adenitis is pain caused by swollen lymph nodes in the belly. In most cases, it is caused by the body's reaction to an infection. Symptoms are often like those from appendicitis. You may have belly pain, nausea, and vomiting. But this condition almost always gets better on its own. Follow-up care is a key part of your treatment and safety. Be sure to make and go to all appointments, and call your doctor if you are having problems. It's also a good idea to know your test results and keep a list of the medicines you take. How can you care for yourself at home? · Rest.  · To prevent dehydration, drink plenty of fluids. Choose water and other caffeine-free clear liquids until you feel better. If you have kidney, heart, or liver disease and have to limit fluids, talk with your doctor before you increase the amount of fluids you drink. · You may find that it helps to put a warm water bottle, a heating pad set on low, or a warm cloth on your belly. · Ask your doctor if you can take an over-the-counter pain medicine, such as acetaminophen (Tylenol), ibuprofen (Advil, Motrin), or naproxen (Aleve). Be safe with medicines. Read and follow all instructions on the label. · If your doctor prescribed antibiotics, take them as directed. Do not stop taking them just because you feel better. You need to take the full course of antibiotics. When should you call for help? Call your doctor now or seek immediate medical care if:    · You have new or worse belly pain.     · You have a fever.     · You are vomiting.     · You cannot pass stools or gas.    Watch closely for changes in your health, and be sure to contact your doctor if you have any problems.   Current as of: November 7, 2018  Content Version: 12.1  © 9297-1165 [employer]. Care instructions adapted under license by [employer] (which disclaims liability or warranty for this information). If you have questions about a medical condition or this instruction, always ask your healthcare professional. [address] any warranty or liability for your use of this information.

## 2019-09-06 NOTE — ED NOTES
Upon entering room patient relayed \" my IV is swollen\". Noted with infiltration and IV fluids stopped. Relayed to MD that IV infiltrated. IV removed, Ice pack applied to site.

## 2019-09-08 LAB
ATRIAL RATE: 98 BPM
CALCULATED P AXIS, ECG09: 44 DEGREES
CALCULATED R AXIS, ECG10: -11 DEGREES
CALCULATED T AXIS, ECG11: 40 DEGREES
DIAGNOSIS, 93000: NORMAL
P-R INTERVAL, ECG05: 126 MS
Q-T INTERVAL, ECG07: 384 MS
QRS DURATION, ECG06: 84 MS
QTC CALCULATION (BEZET), ECG08: 490 MS
VENTRICULAR RATE, ECG03: 98 BPM

## 2019-09-11 LAB
BACTERIA SPEC CULT: NORMAL
SERVICE CMNT-IMP: NORMAL

## 2022-05-24 ENCOUNTER — OFFICE VISIT (OUTPATIENT)
Dept: ORTHOPEDIC SURGERY | Age: 72
End: 2022-05-24
Payer: MEDICARE

## 2022-05-24 VITALS — WEIGHT: 196 LBS | BODY MASS INDEX: 30.76 KG/M2 | HEIGHT: 67 IN

## 2022-05-24 DIAGNOSIS — Z96.659 FAILURE OF TOTAL KNEE REPLACEMENT, SUBSEQUENT ENCOUNTER: ICD-10-CM

## 2022-05-24 DIAGNOSIS — Z96.651 STATUS POST RIGHT KNEE REPLACEMENT: Primary | ICD-10-CM

## 2022-05-24 DIAGNOSIS — T84.018D FAILURE OF TOTAL KNEE REPLACEMENT, SUBSEQUENT ENCOUNTER: ICD-10-CM

## 2022-05-24 PROCEDURE — 1090F PRES/ABSN URINE INCON ASSESS: CPT | Performed by: ORTHOPAEDIC SURGERY

## 2022-05-24 PROCEDURE — 3017F COLORECTAL CA SCREEN DOC REV: CPT | Performed by: ORTHOPAEDIC SURGERY

## 2022-05-24 PROCEDURE — G8417 CALC BMI ABV UP PARAM F/U: HCPCS | Performed by: ORTHOPAEDIC SURGERY

## 2022-05-24 PROCEDURE — G8400 PT W/DXA NO RESULTS DOC: HCPCS | Performed by: ORTHOPAEDIC SURGERY

## 2022-05-24 PROCEDURE — G8427 DOCREV CUR MEDS BY ELIG CLIN: HCPCS | Performed by: ORTHOPAEDIC SURGERY

## 2022-05-24 PROCEDURE — 1101F PT FALLS ASSESS-DOCD LE1/YR: CPT | Performed by: ORTHOPAEDIC SURGERY

## 2022-05-24 PROCEDURE — G8756 NO BP MEASURE DOC: HCPCS | Performed by: ORTHOPAEDIC SURGERY

## 2022-05-24 PROCEDURE — 99214 OFFICE O/P EST MOD 30 MIN: CPT | Performed by: ORTHOPAEDIC SURGERY

## 2022-05-24 PROCEDURE — 20610 DRAIN/INJ JOINT/BURSA W/O US: CPT | Performed by: ORTHOPAEDIC SURGERY

## 2022-05-24 PROCEDURE — G8432 DEP SCR NOT DOC, RNG: HCPCS | Performed by: ORTHOPAEDIC SURGERY

## 2022-05-24 PROCEDURE — G8536 NO DOC ELDER MAL SCRN: HCPCS | Performed by: ORTHOPAEDIC SURGERY

## 2022-05-24 PROCEDURE — 1123F ACP DISCUSS/DSCN MKR DOCD: CPT | Performed by: ORTHOPAEDIC SURGERY

## 2022-05-24 RX ORDER — PREDNISONE 5 MG/1
TABLET ORAL
COMMUNITY
End: 2022-06-14

## 2022-05-24 RX ORDER — SPIRONOLACTONE 25 MG/1
TABLET ORAL EVERY MORNING
COMMUNITY
Start: 2022-03-19

## 2022-05-24 RX ORDER — PANTOPRAZOLE SODIUM 40 MG/1
40 TABLET, DELAYED RELEASE ORAL EVERY MORNING
COMMUNITY

## 2022-05-24 RX ORDER — AMLODIPINE BESYLATE 5 MG/1
5 TABLET ORAL DAILY
COMMUNITY
End: 2022-06-14

## 2022-05-24 RX ORDER — METFORMIN HYDROCHLORIDE 500 MG/1
TABLET ORAL
COMMUNITY
Start: 2022-04-19

## 2022-05-24 NOTE — PROGRESS NOTES
Leobardo Garcia (: 1950) is a 66 y.o. female, patient, here for evaluation of the following chief complaint(s):  Knee Pain (right knee pain, hx of 4 TKA, 4 revisions and 2 arthroscopies on right knee )       HPI:    This is a very complicated history well-documented in her notes. She had total knee elsewhere. Had revision elsewhere. Presented to me with probably an infected total knee. Underwent reconstruction. Really has never gotten better. I replaced her left knee which is done pretty well. Patient has an underlying inflammatory diagnoses and has persistently positive sed rate and CRP. No Known Allergies    Current Outpatient Medications   Medication Sig    apixaban (Eliquis) 5 mg tablet Take 5 mg by mouth two (2) times a day.  amLODIPine (NORVASC) 5 mg tablet Take 5 mg by mouth daily.  metFORMIN (GLUCOPHAGE) 500 mg tablet     metoprolol succinate (TOPROL-XL) 50 mg XL tablet     pantoprazole (PROTONIX) 40 mg tablet Take 40 mg by mouth daily.  spironolactone (ALDACTONE) 25 mg tablet     predniSONE (DELTASONE) 5 mg tablet Take  by mouth.  levETIRAcetam (KEPPRA XR) 500 mg ER tablet Take 1,000 mg by mouth nightly.  folic acid (FOLVITE) 1 mg tablet Take 1 mg by mouth daily.  methotrexate (RHEUMATREX) 2.5 mg tablet Take 20 mg by mouth every Wednesday.  atorvastatin (LIPITOR) 20 mg tablet Take 20 mg by mouth daily.  potassium chloride SR (KLOR-CON 10) 10 mEq tablet Take 10 mEq by mouth two (2) times a day.  ondansetron hcl (ZOFRAN) 4 mg tablet Take 1 Tab by mouth every eight (8) hours as needed for Nausea. (Patient not taking: Reported on 2022)    amoxicillin-clavulanate (AUGMENTIN) 875-125 mg per tablet Take 1 Tab by mouth two (2) times a day. (Patient not taking: Reported on 2022)    acetaminophen-codeine (TYLENOL-CODEINE #3) 300-30 mg per tablet Take 1 Tab by mouth every eight (8) hours as needed for Pain.  (Patient not taking: Reported on 2022)    gabapentin (NEURONTIN) 300 mg capsule Take 300 mg by mouth nightly. (Patient not taking: Reported on 5/24/2022)    aspirin delayed-release 81 mg tablet Take 81 mg by mouth daily. (Patient not taking: Reported on 5/24/2022)    omeprazole (PRILOSEC) 20 mg capsule Take 20 mg by mouth Daily (before breakfast). (Patient not taking: Reported on 5/24/2022)    Cholecalciferol, Vitamin D3, (VITAMIN D) 1,000 unit Cap Take 2 Tabs by mouth daily. (Patient not taking: Reported on 5/24/2022)    losartan-hydrochlorothiazide (HYZAAR) 100-25 mg per tablet Take 1 Tab by mouth daily. No current facility-administered medications for this visit.        Past Medical History:   Diagnosis Date    Arthritis     GERD (gastroesophageal reflux disease)     Hypercholesterolemia     Hypertension     Ill-defined condition     cellulitis    Other ill-defined conditions(799.89)     MGUS (MONOCLONAL GAMMOPATHY OF UNCLEAR SIGNIFICANCE)    Prediabetes     Seizures ([facility] [address] 2016    PER NEUROLOGIST ABSENCE SEIZURES, patient states    Stroke [facility]) 2006    TIA    Syncope and collapse     loop recorder placed 4/2016 (syncope of unknown etiology)        Past Surgical History:   Procedure Laterality Date    HX APPENDECTOMY      REMOVED DURING HYSTERECTOMY    HX COLONOSCOPY      HX DILATION AND CURETTAGE      HX HYSTERECTOMY  1996    HX IMPLANTABLE LOOP RECORDER  04/29/2016    HX KNEE ARTHROSCOPY  12/17/2009/ AND 2008    RIGHT KNEE    HX KNEE REPLACEMENT Right 2013 2016    HX ORTHOPAEDIC  9/09 AND  5/24/2011    RIGHT TOTAL KNEE, L-knee replacement    HX ORTHOPAEDIC  1/31/2011   10/24/2011    REMOVED KNEE REPLACEMENT    HX ORTHOPAEDIC Left 09/05/2017    HAND    HX ROTATOR CUFF REPAIR Right 2008    HX SHOULDER ARTHROSCOPY  2003    RIGHT       Family History   Problem Relation Age of Onset    Hypertension Mother     Diabetes Mother     Heart Disease Mother         MI    Heart Attack Mother     Heart Disease Father         DEBORAH Richter Heart Attack Father     Heart Surgery Sister         STENT, VALVE SURGERY    Hypertension Sister     Diabetes Sister     Heart Attack Brother     Diabetes Brother     Diabetes Brother     Hypertension Brother     Diabetes Sister     Hypertension Sister     Elevated Lipids Sister     Heart Surgery Sister     Anesth Problems Neg Hx         Social History     Socioeconomic History    Marital status:      Spouse name: Not on file    Number of children: Not on file    Years of education: Not on file    Highest education level: Not on file   Occupational History    Not on file   Tobacco Use    Smoking status: Never Smoker    Smokeless tobacco: Never Used   Substance and Sexual Activity    Alcohol use: No     Comment: RARE- WINE 1/YR    Drug use: No    Sexual activity: Not on file   Other Topics Concern     Service Not Asked    Blood Transfusions Not Asked    Caffeine Concern Not Asked    Occupational Exposure Not Asked    Hobby Hazards Not Asked    Sleep Concern Not Asked    Stress Concern Not Asked    Weight Concern Not Asked    Special Diet Not Asked    Back Care Not Asked    Exercise Not Asked    Bike Helmet Not Asked    Seat Belt Not Asked    Self-Exams Not Asked   Social History Narrative    Not on file     Social Determinants of Health     Financial Resource Strain:     Difficulty of Paying Living Expenses: Not on file   Food Insecurity:     Worried About Running Out of Food in the Last Year: Not on file    Sherif of Food in the Last Year: Not on file   Transportation Needs:     Lack of Transportation (Medical): Not on file    Lack of Transportation (Non-Medical):  Not on file   Physical Activity:     Days of Exercise per Week: Not on file    Minutes of Exercise per Session: Not on file   Stress:     Feeling of Stress : Not on file   Social Connections:     Frequency of Communication with Friends and Family: Not on file    Frequency of Social Gatherings with Friends and Family: Not on file    Attends Jehovah's witness Services: Not on file    Active Member of Clubs or Organizations: Not on file    Attends Club or Organization Meetings: Not on file    Marital Status: Not on file   Intimate Partner Violence:     Fear of Current or Ex-Partner: Not on file    Emotionally Abused: Not on file    Physically Abused: Not on file    Sexually Abused: Not on file   Housing Stability:     Unable to Pay for Housing in the Last Year: Not on file    Number of [place] in the Last Year: Not on file    Unstable Housing in the Last Year: Not on file             Vitals:  Ht 5' 6.5\" (1.689 m)   Wt 196 lb (88.9 kg)   BMI 31.16 kg/m²    Body mass index is 31.16 kg/m². PHYSICAL EXAM:  On exam today her right total knee is unstable. Small effusion. No warmth. Extensor mechanism is intact. Right hip has painless range of motion. IMAGING:  XR Results (most recent):  Results from Appointment encounter on 05/24/22    XR KNEE RT 3 V    Narrative  3 views right knee. Her femoral component and tibial components are probably loose compared to prior films. ASSESSMENT/PLAN:  1. Status post right knee replacement  -     XR KNEE RT 3 V; Future  2. Failure of total knee replacement, subsequent encounter    After verbal consent for knee aspiration the patient's right knee was prepped with Betadine and then alcohol. I aspirated the patient's right knee today. Fluid was sent for [facility] analysis. Very difficult situation but I am going to plan for revision of both components. We discussed continued conservative treatment measures versus total joint replacement. Symptoms have progressed despite conservative treatment measures outlined above and they desire to proceed with revision right total knee. Patient has had symptoms for over 10 years. They have decline in their activities of daily living with inability to walk long distances.   There has been progressive decline in function. Discussed risks, benefits, and alternatives in detail, as well as anticipated hospital stay and course of rehabilitation. They will see their primary care physician prior to surgery. All questions answered. An electronic signature was used to authenticate this note.   --Dago Avalos MD

## 2022-05-25 DIAGNOSIS — T84.018D FAILURE OF TOTAL KNEE REPLACEMENT, SUBSEQUENT ENCOUNTER: Primary | ICD-10-CM

## 2022-05-25 DIAGNOSIS — Z96.659 FAILURE OF TOTAL KNEE REPLACEMENT, SUBSEQUENT ENCOUNTER: Primary | ICD-10-CM

## 2022-06-14 ENCOUNTER — HOSPITAL ENCOUNTER (OUTPATIENT)
Dept: PREADMISSION TESTING | Age: 72
Discharge: HOME | End: 2022-06-14
Payer: MEDICARE

## 2022-06-14 VITALS
SYSTOLIC BLOOD PRESSURE: 182 MMHG | DIASTOLIC BLOOD PRESSURE: 85 MMHG | BODY MASS INDEX: 31.12 KG/M2 | HEART RATE: 63 BPM | TEMPERATURE: 98.1 F | WEIGHT: 195.77 LBS

## 2022-06-14 LAB
ANION GAP SERPL CALC-SCNC: 7 MMOL/L (ref 5–15)
APPEARANCE UR: CLEAR
ATRIAL RATE: 60 BPM
BACTERIA URNS QL MICRO: ABNORMAL /HPF
BILIRUB UR QL: NEGATIVE
BUN SERPL-MCNC: 22 MG/DL (ref 6–20)
BUN/CREAT SERPL: 16 (ref 12–20)
CALCIUM SERPL-MCNC: 9.8 MG/DL (ref 8.5–10.1)
CALCULATED P AXIS, ECG09: 34 DEGREES
CALCULATED R AXIS, ECG10: -8 DEGREES
CALCULATED T AXIS, ECG11: 43 DEGREES
CHLORIDE SERPL-SCNC: 110 MMOL/L (ref 97–108)
CO2 SERPL-SCNC: 23 MMOL/L (ref 21–32)
COLOR UR: ABNORMAL
CREAT SERPL-MCNC: 1.35 MG/DL (ref 0.55–1.02)
DIAGNOSIS, 93000: NORMAL
EPITH CASTS URNS QL MICRO: ABNORMAL /LPF
ERYTHROCYTE [DISTWIDTH] IN BLOOD BY AUTOMATED COUNT: 14.7 % (ref 11.5–14.5)
EST. AVERAGE GLUCOSE BLD GHB EST-MCNC: 126 MG/DL
GLUCOSE SERPL-MCNC: 79 MG/DL (ref 65–100)
GLUCOSE UR STRIP.AUTO-MCNC: NEGATIVE MG/DL
HBA1C MFR BLD: 6 % (ref 4–5.6)
HCT VFR BLD AUTO: 38.1 % (ref 35–47)
HGB BLD-MCNC: 11.5 G/DL (ref 11.5–16)
HGB UR QL STRIP: NEGATIVE
HYALINE CASTS URNS QL MICRO: ABNORMAL /LPF (ref 0–5)
INR PPP: 1.4 (ref 0.9–1.1)
KETONES UR QL STRIP.AUTO: NEGATIVE MG/DL
LEUKOCYTE ESTERASE UR QL STRIP.AUTO: NEGATIVE
MCH RBC QN AUTO: 30.1 PG (ref 26–34)
MCHC RBC AUTO-ENTMCNC: 30.2 G/DL (ref 30–36.5)
MCV RBC AUTO: 99.7 FL (ref 80–99)
NITRITE UR QL STRIP.AUTO: NEGATIVE
NRBC # BLD: 0 K/UL (ref 0–0.01)
NRBC BLD-RTO: 0 PER 100 WBC
P-R INTERVAL, ECG05: 160 MS
PH UR STRIP: 5.5 [PH] (ref 5–8)
PLATELET # BLD AUTO: 260 K/UL (ref 150–400)
PMV BLD AUTO: 10.7 FL (ref 8.9–12.9)
POTASSIUM SERPL-SCNC: 5.3 MMOL/L (ref 3.5–5.1)
PROT UR STRIP-MCNC: NEGATIVE MG/DL
PROTHROMBIN TIME: 14.6 SEC (ref 9–11.1)
Q-T INTERVAL, ECG07: 438 MS
QRS DURATION, ECG06: 72 MS
QTC CALCULATION (BEZET), ECG08: 438 MS
RBC # BLD AUTO: 3.82 M/UL (ref 3.8–5.2)
RBC #/AREA URNS HPF: ABNORMAL /HPF (ref 0–5)
SODIUM SERPL-SCNC: 140 MMOL/L (ref 136–145)
SP GR UR REFRACTOMETRY: 1.01 (ref 1–1.03)
UA: UC IF INDICATED,UAUC: ABNORMAL
UROBILINOGEN UR QL STRIP.AUTO: 0.2 EU/DL (ref 0.2–1)
VENTRICULAR RATE, ECG03: 60 BPM
WBC # BLD AUTO: 7.3 K/UL (ref 3.6–11)
WBC URNS QL MICRO: ABNORMAL /HPF (ref 0–4)

## 2022-06-14 PROCEDURE — 93005 ELECTROCARDIOGRAM TRACING: CPT

## 2022-06-14 PROCEDURE — 86923 COMPATIBILITY TEST ELECTRIC: CPT

## 2022-06-14 PROCEDURE — 83036 HEMOGLOBIN GLYCOSYLATED A1C: CPT

## 2022-06-14 PROCEDURE — 80048 BASIC METABOLIC PNL TOTAL CA: CPT

## 2022-06-14 PROCEDURE — 81001 URINALYSIS AUTO W/SCOPE: CPT

## 2022-06-14 PROCEDURE — 85027 COMPLETE CBC AUTOMATED: CPT

## 2022-06-14 PROCEDURE — 85610 PROTHROMBIN TIME: CPT

## 2022-06-14 PROCEDURE — 86900 BLOOD TYPING SEROLOGIC ABO: CPT

## 2022-06-14 RX ORDER — CERTOLIZUMAB PEGOL 200 MG/ML
INJECTION, SOLUTION SUBCUTANEOUS
COMMUNITY

## 2022-06-14 RX ORDER — METOPROLOL SUCCINATE 100 MG/1
TABLET, EXTENDED RELEASE ORAL EVERY MORNING
COMMUNITY

## 2022-06-14 NOTE — PERIOP NOTES
[address] [facility] INSTRUCTIONS  ORTHOPAEDIC    Surgery Date:   06/22/2022    Your surgeon's office or [facility] staff will call you between 4 PM- 8 PM the day before surgery with your arrival time. If your surgery is on a Monday, you will receive a call the preceding Friday. 1. Please report to [facility] Patient Access/Admitting on the 1st floor. Bring your insurance card, photo identification, and any copayment (if applicable). 2. If you are going home the same day of your surgery, you must have a responsible adult to drive you home. You need to have a responsible adult to stay with you the first 24 hours after surgery and you should not drive a car for 24 hours following your surgery. 3. Do NOT eat any solid foods after midnight the night before surgery including candy, mints or gum. You may drink clear liquids from midnight until 1 hour prior to arrival time. You may drink up to 12 ounces at one time every 4 hours. 4. Do NOT drink alcohol or smoke 24 hours before surgery. STOP smoking for 14 days prior as it helps with breathing and healing after surgery. 5. If your arrival time is 3pm or later, you may eat a light breakfast before 8am (toast, bagel-no butter, black coffee, plain tea, fruit juice-no pulp) Please note special instructions, if applicable, below for medications. 6. If you are being admitted to the hospital,please leave personal belongings/luggage in your car until you have an assigned hospital room number. 7. Please wear comfortable clothes. Wear your glasses instead of contacts. We ask that all money, jewelry and valuables be left at home. Wear no make up, particularly mascara, the day of surgery. 8.  All body piercings, rings, and jewelry need to be removed and left at home. Please remove any nail polish or artificial nails from your fingernails. Please wear your hair loose or down. Please no pony-tails, buns, or any metal hair accessories.  If you shower the morning of surgery, please do not apply any lotions or powders afterwards. You may wear deodorant. Do not shave any body area within 24 hours of your surgery. 9. Please follow all instructions to avoid any potential surgical cancellation. 10. Should your physical condition change, (i.e. fever, cold, flu, etc.) please notify your surgeon as soon as possible. 11. It is important to be on time. If a situation occurs where you may be delayed, please call:  [phone] / [phone] on the day of surgery. 12. The Preadmission Testing staff can be reached at [phone]. 13. Special instructions: BRING ADVANCE DIRECTIVE, IF  COMPLETED    Current Outpatient Medications   Medication Sig    certolizumab pegoL (Cimzia) 400 mg/2 mL (200 mg/mL x 2) sykt injection by SubCUTAneous route every four (4) weeks.  rivaroxaban (Xarelto) 20 mg tab tablet Take  by mouth daily (with dinner).  oxymetazoline (AFRIN) 0.05 % nasal mist 2 Sprays two (2) times daily as needed for Congestion.  metFORMIN (GLUCOPHAGE) 500 mg tablet nightly.  pantoprazole (PROTONIX) 40 mg tablet Take 40 mg by mouth Every morning.  spironolactone (ALDACTONE) 25 mg tablet Every morning.  levETIRAcetam (KEPPRA XR) 500 mg ER tablet Take 1,000 mg by mouth nightly.  folic acid (FOLVITE) 1 mg tablet Take 1 mg by mouth daily.  methotrexate (RHEUMATREX) 2.5 mg tablet Take 22.5 mg by mouth every Wednesday.  atorvastatin (LIPITOR) 20 mg tablet Take 20 mg by mouth Every morning.  Cholecalciferol, Vitamin D3, (VITAMIN D) 1,000 unit Cap Take 2 Tablets by mouth Every morning.  potassium chloride SR (KLOR-CON 10) 10 mEq tablet Take 10 mEq by mouth two (2) times a day. No current facility-administered medications for this encounter. 1. YOU MUST ONLY TAKE THESE MEDICATIONS THE MORNING OF SURGERY WITH A SIP OF WATER: ATORVASTATIN, OMEPRAZOLE, METOPROLOL  2.  MEDICATIONS TO TAKE THE MORNING OF SURGERY ONLY IF NEEDED: Alex Herrera, TYLENOL  3. HOLD these prescription medications BEFORE Surgery: NO METFORMIN 6/21 OR 6/22  4. Ask your surgeon/prescribing physician about when/if to STOP taking these medications: XARELTO  5. Stop any non-steroidal anti-inflammatory drugs (i.e. Ibuprofen, Naproxen, Advil, Aleve) 3 days before surgery. You may take Tylenol. STOP all vitamins and herbal supplements 1 week prior to  surgery. 6. If you are currently taking Plavix, Coumadin, or any other blood-thinning/anticoagulant medication contact your prescribing physician for instructions. Preventing Infections Before and After - Your Surgery    IMPORTANT INSTRUCTIONS    You play an important role in your health and preparation for surgery. To reduce the germs on your skin you will need to shower with CHG soap (Chorhexidine gluconate 4%) two times before surgery. CHG soap (Hibiclens, Hex-A-Clens or store brand)   CHG soap will be provided at your Preadmission Testing (PAT) appointment.  If you do not have a PAT appointment before surgery, you may arrange to  CHG soap from our office or purchase CHG soap at a pharmacy, grocery or department store.  You need to purchase TWO 4 ounce bottles to use for your 2 showers. Steps to follow:  1. Wash your hair with your normal shampoo and your body with regular soap and rinse well to remove shampoo and soap from your skin. 2. Wet a clean washcloth and turn off the shower. 3. Put CHG soap on washcloth and apply to your entire body from the neck down. Do not use on your head, face or private parts(genitals). Do not use CHG soap on open sores, wounds or areas of skin irritation. 4. Wash you body gently for 5 minutes. Do not wash your skin too hard. This soap does not create lather. Pay special attention to your underarms and from your belly button to your feet. 5. Turn the shower back on and rinse well to get CHG soap off your body. 6. Pat your skin dry with a clean, dry towel.  Do not apply lotions or moisturizer. 7. Put on clean clothes and sleep on fresh bed sheets and do not allow pets to sleep with you. Shower with CHG soap 2 times before your surgery   The evening before your surgery   The morning of your surgery      Tips to help prevent infections after your surgery:  1. Protect your surgical wound from germs:  ? Hand washing is the most important thing you and your caregivers can do to prevent infections. ? Keep your bandage clean and dry! ? Do not touch your surgical wound. 2. Use clean, freshly washed towels and washcloths every time you shower; do not share bath linens with others. 3. Until your surgical wound is healed, wear clothing and sleep on bed linens each day that are clean and freshly washed. 4. Do not allow pets to sleep in your bed with you or touch your surgical wound. 5. Do not smoke - smoking delays wound healing. This may be a good time to stop smoking. 6. If you have diabetes, it is important for you to manage your blood sugar levels properly before your surgery as well as after your surgery. Poorly managed blood sugar levels slow down wound healing and prevent you from healing completely. Prevention of Infection  Testing for Staphylococcus aureus on your skin before surgery    Staphylococcus aureus (staph) is a common bacteria that is found on the body. It normally does not cause infection on healthy skin. Before surgery, you will be tested to see if you have staph by swabbing the inside of your nose. When you have an incision with surgery, the goal is to protect that incision from infection. Removal of the staph bacteria before surgery can decrease the risk of a surgical site infection. If your nose swab is positive for staph you will be called. Your treatment will include 2 steps:   Prescription for Mupirocin ointment to be used in each nostril twice a day for 5 days.  Showering with Chlorhexidine (CHG) liquid soap for 5 days prior to surgery.     How to use Mupirocin ointment in your nose  1.  the prescription from your pharmacy. You will receive a large tube of ointment which will be big enough for all of your treatments. You will apply this ointment to each nostril 2 times a day for 5 days. 2. Wash your hands with  gel or soap and water for 20 seconds before using ointment. 3. Place a pea-sized amount of ointment on a cotton Q-tip. 4. Apply ointment just inside of each nostril with the Q-tip. Do not push Q-tip or ointment deep inside you nose. 5. Press your nostrils together and massage for a few seconds. 6. Wash your hands with  gel or soap and water after you are finished. 7. Do not get ointment near your eyes. If it gets into your eyes, rinse them with cool water. 8. If you need to use nasal spray, clean the tip of the bottle with alcohol before use and do not use both at the same time. 9. If you are scheduled for COVID testing during the 5 days, do NOT apply morning dose until after the COVID test has been performed. How to use Chlorhexidine (CHG) 4% liquid soap  1. Purchase an 8 ounce bottle of CHG liquid soap (Chlorhexidine 4%, Hibiclens, Hex-A-Clens or store brand) at a pharmacy or grocery store. 2. Wash your hair with your normal shampoo and your body with regular soap and rinse well to remove shampoo and soap from your skin. 3. Wet a clean washcloth and turn off the shower. 4. Put CHG soap on washcloth and apply to your entire body from the neck down. Do not use on your head, face or private parts(genitals). Do not use CHG soap on open sores, wounds or areas of skin irritation. 5. Wash your body gently for 5 minutes. Do not wash your skin too hard. This soap does not create lather. Pay special attention to your underarms and from your belly button to your feet. 6. Turn the shower back on and rinse well to get CHG soap off your body. 7. Pat your skin dry with a clean, dry towel.  Do not apply lotions or moisturizer. 8. Put on clean clothes and sleep on fresh bed sheets the night before surgery. Do not allow pets to sleep with you. Eating and Drinking Before Surgery     You may eat a regular dinner at the usual time on the day before your surgery.  Do NOT eat any solid foods after midnight unless your arrival time at the hospital is 3pm or later.  You may drink clear liquids only from 12 midnight until 1 hours prior to your arrival time at the hospital on the day of your surgery. Do NOT drink alcohol.  Clear liquids include:  o Water  o Fruit juices without pulp( i.e. apple juice)  o Carbonated beverages  o Black coffee (no cream/milk)  o Tea (no cream/milk)  o Gatorade   You may drink up to 12-16 ounces at one time every 4 hours between the hours of midnight and 1 hour before your arrival time at the hospital. Example- if your arrival time at the hospital is 6am, you may drink 12-16 ounces of clear liquids no later than 5am.   If your arrival time at the hospital is 3pm or later, you may eat a light breakfast before 8am.   A light breakfast includes:  o Toast or bagel (no butter)  o Black coffee (no cream/milk)  o Tea (no cream/milk)  o Fruit juices without pulp ( i.e. apple juice)  o Do NOT eat meat, eggs, vegetables or fruit   If you have any questions, please contact your surgeon's office. Patient Information Regarding COVID Restrictions    Day of Procedure     Please park in the parking deck or any designated visitor parking lot.  Enter the facility through the [facility] of the [facility].   On the day of surgery, please provide the cell phone number of the person who will be waiting for you to the Patient Access representative at the time of registration.  Please wear a mask on the day of your procedure.  We are now allowing two designated visitors per stay. Pediatric patients may have 2 designated visitors.  These two people may come in with you on the day of your procedure.  No visitors under the age of 13.  The designated visitor must also wear a mask.  Once your procedure and the immediate recovery period is completed, a nurse in the recovery area will contact your designated visitor to inform them of your room number or to otherwise review other pertinent information regarding your care.  Social distancing practices are to be adhered to in waiting areas and the cafeteria. The patient was contacted in person. She verbalized understanding of all instructions does not  need reinforcement.

## 2022-06-15 LAB
BACTERIA SPEC CULT: NORMAL
BACTERIA SPEC CULT: NORMAL
SERVICE CMNT-IMP: NORMAL

## 2022-06-15 NOTE — PERIOP NOTES
PAT Nurse Practitioner   Pre-Operative Chart Review/Assessment:-ORTHOPEDIC                Patient Name:  Ta Hawkins                                                           Age:   66 y.o.    :  1950     Today's Date:  2022     Date of PAT:   2022      Date of Surgery:    2022      Procedure(s):  Right Total Knee Arthroplasty     Surgeon:   Dr. Carlos Toribio                       PLAN:      1)  Medical Clearance:  Dr. Holden Bustillo      2)  Cardiac Clearance:  Pt followed by Dr. Noni Mcgee). LOV 22. Clearance obtained. OV note and EKG on chart. PAT EKG: unchanged from previous tracings, normal sinus rhythm, LVH. 3)  Diabetic Treatment Consult:  Not indicated. A1c-6.0      4)  Sleep Apnea evaluation:   Pt had SS in the past; doesn't know results.        5) Treatment for MRSA/Staph Aureus:  Neg      6) Additional Concerns:  HTN, GERD, T2DM, A-fib, s/p ILM([employer]), Mult Myeloma w/ MGUS, RA, hx of DVT, hx of TIA, hx of seizures, mult. knee revisions bilaterally              Vital Signs:         Vitals:    22 0825 22 0858   BP: (!) 182/85    Pulse: 63    Temp: 98.1 °F (36.7 °C)    Weight:  88.8 kg (195 lb 12.3 oz)            ____________________________________________  PAST MEDICAL HISTORY  Past Medical History:   Diagnosis Date    A-fib ([address]     Arthritis     Diabetes ([facility]     GERD (gastroesophageal reflux disease)     Hypercholesterolemia     Hypertension     Ill-defined condition     cellulitis    Multiple myeloma ([address]     WITH MGUS    Prediabetes     Seizures ([address] 2016    PER NEUROLOGIST ABSENCE SEIZURES, patient states    Sleep apnea     TESTED-DOESN'T KNOW RESULTS    Stroke ([facility] 2006    TIA    Syncope and collapse     loop recorder placed 2016 (syncope of unknown etiology)    Thromboembolus ([facility]     ANKLE      ____________________________________________  PAST SURGICAL HISTORY  Past Surgical History:   Procedure Laterality Date    HX APPENDECTOMY REMOVED DURING HYSTERECTOMY    HX COLONOSCOPY      HX DILATION AND CURETTAGE      HX HYSTERECTOMY  1996    HX IMPLANTABLE LOOP RECORDER  04/29/2016    HX KNEE ARTHROSCOPY  12/17/2009/ AND 2008    RIGHT KNEE    HX KNEE REPLACEMENT Right 2013 2016    HX ORTHOPAEDIC  9/09 AND  5/24/2011    RIGHT TOTAL KNEE, L-knee replacement    HX ORTHOPAEDIC  1/31/2011   10/24/2011    REMOVED KNEE REPLACEMENT    HX ORTHOPAEDIC Left 09/05/2017    HAND    HX ORTHOPAEDIC      RT TKR REVISIONS X4, LEFT TKR  REVISION X1    HX ROTATOR CUFF REPAIR Right 2008    HX SHOULDER ARTHROSCOPY  2003    RIGHT    HX WRIST FRACTURE TX Left     IR REPAIR CVC W/O PORT OR PUMP  @2020      ____________________________________________  HOME MEDICATIONS  Current Outpatient Medications   Medication Sig    certolizumab pegoL (Cimzia) 400 mg/2 mL (200 mg/mL x 2) sykt injection by SubCUTAneous route every four (4) weeks.  rivaroxaban (Xarelto) 20 mg tab tablet Take  by mouth daily (with dinner).  oxymetazoline (AFRIN) 0.05 % nasal mist 2 Sprays two (2) times daily as needed for Congestion.  metoprolol succinate (TOPROL-XL) 100 mg tablet Take  by mouth Every morning.  metFORMIN (GLUCOPHAGE) 500 mg tablet nightly.  pantoprazole (PROTONIX) 40 mg tablet Take 40 mg by mouth Every morning.  spironolactone (ALDACTONE) 25 mg tablet Every morning.  levETIRAcetam (KEPPRA XR) 500 mg ER tablet Take 1,000 mg by mouth nightly.  folic acid (FOLVITE) 1 mg tablet Take 1 mg by mouth daily.  methotrexate (RHEUMATREX) 2.5 mg tablet Take 22.5 mg by mouth every Wednesday.  atorvastatin (LIPITOR) 20 mg tablet Take 20 mg by mouth Every morning.  Cholecalciferol, Vitamin D3, (VITAMIN D) 1,000 unit Cap Take 2 Tablets by mouth Every morning.  potassium chloride SR (KLOR-CON 10) 10 mEq tablet Take 10 mEq by mouth two (2) times a day. No current facility-administered medications for this encounter. ____________________________________________  ALLERGIES  No Known Allergies   ____________________________________________  SOCIAL HISTORY  Social History     Tobacco Use    Smoking status: Never Smoker    Smokeless tobacco: Never Used   Substance Use Topics    Alcohol use: Never      ____________________________________________   Internal Administration   First Dose COVID-19, Pierre Poonlle, Primary or Immunocompromised Series, MRNA, PF, 100mcg/0.5mL  10/23/2021   Second Dose COVID-19, Moderna, Primary or Immunocompromised Series, MRNA, PF, 100mcg/0.5mL  03/17/2022     Labs:     Hospital Outpatient Visit on 06/14/2022   Component Date Value Ref Range Status    Ventricular Rate 06/14/2022 60  BPM Final    Atrial Rate 06/14/2022 60  BPM Final    P-R Interval 06/14/2022 160  ms Final    QRS Duration 06/14/2022 72  ms Final    Q-T Interval 06/14/2022 438  ms Final    QTC Calculation (Bezet) 06/14/2022 438  ms Final    Calculated P Axis 06/14/2022 34  degrees Final    Calculated R Axis 06/14/2022 -8  degrees Final    Calculated T Axis 06/14/2022 43  degrees Final    Diagnosis 06/14/2022    Final                    Value:Normal sinus rhythm  Voltage criteria for left ventricular hypertrophy  Abnormal ECG  When compared with ECG of 06-SEP-2019 13:35,  Vent.  rate has decreased BY  38 BPM  Confirmed by Pee Linn MD ([id number]) on 6/14/2022 3:58:41 PM      Sodium 06/14/2022 140  136 - 145 mmol/L Final    Potassium 06/14/2022 5.3* 3.5 - 5.1 mmol/L Final    Chloride 06/14/2022 110* 97 - 108 mmol/L Final    CO2 06/14/2022 23  21 - 32 mmol/L Final    Anion gap 06/14/2022 7  5 - 15 mmol/L Final    Glucose 06/14/2022 79  65 - 100 mg/dL Final    BUN 06/14/2022 22* 6 - 20 MG/DL Final    Creatinine 06/14/2022 1.35* 0.55 - 1.02 MG/DL Final    BUN/Creatinine ratio 06/14/2022 16  12 - 20   Final    GFR est AA 06/14/2022 47* >60 ml/min/1.73m2 Final    GFR est non-AA 06/14/2022 39* >60 ml/min/1.73m2 Final    Estimated GFR is calculated using the IDMS-traceable Modification of Diet in Renal Disease (MDRD) Study equation, reported for both  Americans (GFRAA) and non- Americans (GFRNA), and normalized to 1.73m2 body surface area. The physician must decide which value applies to the patient.  Calcium 06/14/2022 9.8  8.5 - 10.1 MG/DL Final    WBC 06/14/2022 7.3  3.6 - 11.0 K/uL Final    RBC 06/14/2022 3.82  3.80 - 5.20 M/uL Final    HGB 06/14/2022 11.5  11.5 - 16.0 g/dL Final    HCT 06/14/2022 38.1  35.0 - 47.0 % Final    MCV 06/14/2022 99.7* 80.0 - 99.0 FL Final    MCH 06/14/2022 30.1  26.0 - 34.0 PG Final    MCHC 06/14/2022 30.2  30.0 - 36.5 g/dL Final    RDW 06/14/2022 14.7* 11.5 - 14.5 % Final    PLATELET 85/71/6631 303  150 - 400 K/uL Final    MPV 06/14/2022 10.7  8.9 - 12.9 FL Final    NRBC 06/14/2022 0.0  0  WBC Final    ABSOLUTE NRBC 06/14/2022 0.00  0.00 - 0.01 K/uL Final    Crossmatch Expiration 06/14/2022 06/25/2022,2359   Final    ABO/Rh(D) 06/14/2022 A POSITIVE   Final    Antibody screen 06/14/2022 NEG   Final    INR 06/14/2022 1.4* 0.9 - 1.1   Final    Comment: A single therapeutic range for Vit K antagonists may not be optimal for all indications - see June, 2008 issue of Chest, American College of Chest Physicians Evidence-Based Clinical Practice Guidelines, 8th Edition.   Instrument and technical errors have been ruled out      Prothrombin time 06/14/2022 14.6* 9.0 - 11.1 sec Final    Instrument and technical errors have been ruled out    Color 06/14/2022 YELLOW/STRAW    Final    Color Reference Range: Straw, Yellow or Dark Yellow    Appearance 06/14/2022 CLEAR  CLEAR   Final    Specific gravity 06/14/2022 1.014  1.003 - 1.030   Final    pH (UA) 06/14/2022 5.5  5.0 - 8.0   Final    Protein 06/14/2022 Negative  NEG mg/dL Final    Glucose 06/14/2022 Negative  NEG mg/dL Final    Ketone 06/14/2022 Negative  NEG mg/dL Final    Bilirubin 06/14/2022 Negative  NEG   Final    Blood 06/14/2022 Negative  NEG   Final    Urobilinogen 06/14/2022 0.2  0.2 - 1.0 EU/dL Final    Nitrites 06/14/2022 Negative  NEG   Final    Leukocyte Esterase 06/14/2022 Negative  NEG   Final    UA:UC IF INDICATED 06/14/2022 CULTURE NOT INDICATED BY UA RESULT  CNI   Final    WBC 06/14/2022 0-4  0 - 4 /hpf Final    RBC 06/14/2022 0-5  0 - 5 /hpf Final    Epithelial cells 06/14/2022 FEW  FEW /lpf Final    Epithelial cell category consists of squamous cells and /or transitional urothelial cells. Renal tubular cells, if present, are separately identified as such.  Bacteria 06/14/2022 1+* NEG /hpf Final    Hyaline cast 06/14/2022 2-5  0 - 5 /lpf Final    Hemoglobin A1c 06/14/2022 6.0* 4.0 - 5.6 % Final    Comment: NEW METHOD  PLEASE NOTE NEW REFERENCE RANGE  (NOTE)  HbA1C Interpretive Ranges  <5.7              Normal  5.7 - 6.4         Consider Prediabetes  >6.5              Consider Diabetes      Est. average glucose 06/14/2022 126  mg/dL Final    Special Requests: 06/14/2022 NO SPECIAL REQUESTS    Final    Culture result: 06/14/2022 MRSA NOT PRESENT    Final       Skin:     Denies open wounds, cuts, sores, rashes or other areas of concern in PAT assessment.           Rupinder Smith NP

## 2022-06-21 ENCOUNTER — ANESTHESIA EVENT (OUTPATIENT)
Dept: SURGERY | Age: 72
DRG: 467 | End: 2022-06-21
Payer: MEDICARE

## 2022-06-22 ENCOUNTER — HOSPITAL ENCOUNTER (INPATIENT)
Age: 72
LOS: 5 days | Discharge: HOME HEALTH | DRG: 467 | End: 2022-06-27
Attending: ORTHOPAEDIC SURGERY | Admitting: ORTHOPAEDIC SURGERY
Payer: MEDICARE

## 2022-06-22 ENCOUNTER — ANESTHESIA (OUTPATIENT)
Dept: SURGERY | Age: 72
DRG: 467 | End: 2022-06-22
Payer: MEDICARE

## 2022-06-22 DIAGNOSIS — Z96.651 S/P REVISION OF TOTAL KNEE, RIGHT: Primary | ICD-10-CM

## 2022-06-22 DIAGNOSIS — E66.9 OBESITY (BMI 30-39.9): ICD-10-CM

## 2022-06-22 DIAGNOSIS — T84.012A FAILED TOTAL RIGHT KNEE REPLACEMENT, INITIAL ENCOUNTER (HCC): ICD-10-CM

## 2022-06-22 DIAGNOSIS — T84.012D FAILED TOTAL RIGHT KNEE REPLACEMENT, SUBSEQUENT ENCOUNTER: ICD-10-CM

## 2022-06-22 LAB
GLUCOSE BLD STRIP.AUTO-MCNC: 91 MG/DL (ref 65–117)
SERVICE CMNT-IMP: NORMAL

## 2022-06-22 PROCEDURE — 74011250636 HC RX REV CODE- 250/636: Performed by: ORTHOPAEDIC SURGERY

## 2022-06-22 PROCEDURE — 77030038692 HC WND DEB SYS IRMX -B: Performed by: ORTHOPAEDIC SURGERY

## 2022-06-22 PROCEDURE — 74011250636 HC RX REV CODE- 250/636: Performed by: NURSE ANESTHETIST, CERTIFIED REGISTERED

## 2022-06-22 PROCEDURE — 0SRC0J9 REPLACEMENT OF RIGHT KNEE JOINT WITH SYNTHETIC SUBSTITUTE, CEMENTED, OPEN APPROACH: ICD-10-PCS | Performed by: ORTHOPAEDIC SURGERY

## 2022-06-22 PROCEDURE — 65270000029 HC RM PRIVATE

## 2022-06-22 PROCEDURE — 74011250636 HC RX REV CODE- 250/636: Performed by: PHYSICIAN ASSISTANT

## 2022-06-22 PROCEDURE — 74011000250 HC RX REV CODE- 250: Performed by: ANESTHESIOLOGY

## 2022-06-22 PROCEDURE — 77030010788: Performed by: ORTHOPAEDIC SURGERY

## 2022-06-22 PROCEDURE — 77030013079 HC BLNKT BAIR HGGR 3M -A: Performed by: ANESTHESIOLOGY

## 2022-06-22 PROCEDURE — C1776 JOINT DEVICE (IMPLANTABLE): HCPCS | Performed by: ORTHOPAEDIC SURGERY

## 2022-06-22 PROCEDURE — 27487 REVISE/REPLACE KNEE JOINT: CPT | Performed by: PHYSICIAN ASSISTANT

## 2022-06-22 PROCEDURE — 74011250636 HC RX REV CODE- 250/636: Performed by: ANESTHESIOLOGY

## 2022-06-22 PROCEDURE — 77030002916 HC SUT ETHLN J&J -A: Performed by: ORTHOPAEDIC SURGERY

## 2022-06-22 PROCEDURE — 82962 GLUCOSE BLOOD TEST: CPT

## 2022-06-22 PROCEDURE — 77030000032 HC CUF TRNQT ZIMM -B: Performed by: ORTHOPAEDIC SURGERY

## 2022-06-22 PROCEDURE — 77030007866 HC KT SPN ANES BBMI -B: Performed by: ANESTHESIOLOGY

## 2022-06-22 PROCEDURE — C1713 ANCHOR/SCREW BN/BN,TIS/BN: HCPCS | Performed by: ORTHOPAEDIC SURGERY

## 2022-06-22 PROCEDURE — 77030006835 HC BLD SAW SAG STRY -B: Performed by: ORTHOPAEDIC SURGERY

## 2022-06-22 PROCEDURE — 74011000258 HC RX REV CODE- 258: Performed by: ORTHOPAEDIC SURGERY

## 2022-06-22 PROCEDURE — 77030031139 HC SUT VCRL2 J&J -A: Performed by: ORTHOPAEDIC SURGERY

## 2022-06-22 PROCEDURE — 76210000016 HC OR PH I REC 1 TO 1.5 HR: Performed by: ORTHOPAEDIC SURGERY

## 2022-06-22 PROCEDURE — 77030008462 HC STPLR SKN PROX J&J -A: Performed by: ORTHOPAEDIC SURGERY

## 2022-06-22 PROCEDURE — 76060000039 HC ANESTHESIA 4 TO 4.5 HR: Performed by: ORTHOPAEDIC SURGERY

## 2022-06-22 PROCEDURE — 77030041690 HC SYS PINNING KN JNJ -D: Performed by: ORTHOPAEDIC SURGERY

## 2022-06-22 PROCEDURE — 76010000174 HC OR TIME 3.5 TO 4 HR INTENSV-TIER 1: Performed by: ORTHOPAEDIC SURGERY

## 2022-06-22 PROCEDURE — 74011000250 HC RX REV CODE- 250: Performed by: PHYSICIAN ASSISTANT

## 2022-06-22 PROCEDURE — 77030005513 HC CATH URETH FOL11 MDII -B: Performed by: ORTHOPAEDIC SURGERY

## 2022-06-22 PROCEDURE — 27487 REVISE/REPLACE KNEE JOINT: CPT | Performed by: ORTHOPAEDIC SURGERY

## 2022-06-22 PROCEDURE — C9290 INJ, BUPIVACAINE LIPOSOME: HCPCS | Performed by: ORTHOPAEDIC SURGERY

## 2022-06-22 PROCEDURE — 2709999900 HC NON-CHARGEABLE SUPPLY: Performed by: ORTHOPAEDIC SURGERY

## 2022-06-22 PROCEDURE — 0SPC0JZ REMOVAL OF SYNTHETIC SUBSTITUTE FROM RIGHT KNEE JOINT, OPEN APPROACH: ICD-10-PCS | Performed by: ORTHOPAEDIC SURGERY

## 2022-06-22 PROCEDURE — 74011000250 HC RX REV CODE- 250: Performed by: ORTHOPAEDIC SURGERY

## 2022-06-22 PROCEDURE — 74011250637 HC RX REV CODE- 250/637: Performed by: ANESTHESIOLOGY

## 2022-06-22 DEVICE — UNIVERSAL STEM FLUTED 115MM X 14MM: Type: IMPLANTABLE DEVICE | Site: KNEE | Status: FUNCTIONAL

## 2022-06-22 DEVICE — P.F.C. SIGMA FEMORAL ADAPTER BOLT NEUTRAL
Type: IMPLANTABLE DEVICE | Site: KNEE | Status: FUNCTIONAL
Brand: P.F.C. SIGMA

## 2022-06-22 DEVICE — P.F.C. SIGMA FEMORAL ADAPTER 5 DEGREE
Type: IMPLANTABLE DEVICE | Site: KNEE | Status: FUNCTIONAL
Brand: P.F.C. SIGMA

## 2022-06-22 DEVICE — UNIVERSAL FEMORAL SLEEVE FULL POROUS 40MM: Type: IMPLANTABLE DEVICE | Site: KNEE | Status: FUNCTIONAL

## 2022-06-22 DEVICE — P.F.C. SIGMA DISTAL AUGMENT 2.5 8MM RIGHT
Type: IMPLANTABLE DEVICE | Site: KNEE | Status: FUNCTIONAL
Brand: P.F.C. SIGMA

## 2022-06-22 DEVICE — SMARTSET GHV GENTAMICIN HIGH VISCOSITY BONE CEMENT 40G
Type: IMPLANTABLE DEVICE | Site: KNEE | Status: FUNCTIONAL
Brand: SMARTSET

## 2022-06-22 DEVICE — SIGMA FEMORAL TC3 CEMENTED 2.5 RIGHT
Type: IMPLANTABLE DEVICE | Site: KNEE | Status: FUNCTIONAL
Brand: SIGMA

## 2022-06-22 DEVICE — BIO-COMPOSITE CRKSCRW 5.5X14.9MM
Type: IMPLANTABLE DEVICE | Site: KNEE | Status: FUNCTIONAL
Brand: ARTHREX®

## 2022-06-22 DEVICE — P.F.C. SIGMA POSTERIOR AUGMENT COMBO CEMENTED 2.5 8MM
Type: IMPLANTABLE DEVICE | Site: KNEE | Status: FUNCTIONAL
Brand: P.F.C. SIGMA

## 2022-06-22 DEVICE — SIGMA TIBIAL INSERT ROTATING PLATFORM TC3 SIZE 2.5 17.5MM GVF
Type: IMPLANTABLE DEVICE | Site: KNEE | Status: FUNCTIONAL
Brand: SIGMA

## 2022-06-22 DEVICE — P.F.C. SIGMA DISTAL AUGMENT SIZE 2.5 4MM RIGHT
Type: IMPLANTABLE DEVICE | Site: KNEE | Status: FUNCTIONAL
Brand: P.F.C. SIGMA

## 2022-06-22 DEVICE — P.F.C. SIGMA FEMORAL ADAPTER BOLT +2/-2
Type: IMPLANTABLE DEVICE | Site: KNEE | Status: FUNCTIONAL
Brand: P.F.C. SIGMA

## 2022-06-22 RX ORDER — SODIUM CHLORIDE 9 MG/ML
125 INJECTION, SOLUTION INTRAVENOUS CONTINUOUS
Status: DISPENSED | OUTPATIENT
Start: 2022-06-22 | End: 2022-06-24

## 2022-06-22 RX ORDER — HYDROMORPHONE HYDROCHLORIDE 1 MG/ML
1 INJECTION, SOLUTION INTRAMUSCULAR; INTRAVENOUS; SUBCUTANEOUS
Status: DISCONTINUED | OUTPATIENT
Start: 2022-06-22 | End: 2022-06-23

## 2022-06-22 RX ORDER — ROPIVACAINE HYDROCHLORIDE 5 MG/ML
INJECTION, SOLUTION EPIDURAL; INFILTRATION; PERINEURAL
Status: COMPLETED | OUTPATIENT
Start: 2022-06-22 | End: 2022-06-22

## 2022-06-22 RX ORDER — LIDOCAINE HYDROCHLORIDE 10 MG/ML
0.5 INJECTION, SOLUTION EPIDURAL; INFILTRATION; INTRACAUDAL; PERINEURAL AS NEEDED
Status: DISCONTINUED | OUTPATIENT
Start: 2022-06-22 | End: 2022-06-22 | Stop reason: HOSPADM

## 2022-06-22 RX ORDER — FENTANYL CITRATE 50 UG/ML
50 INJECTION, SOLUTION INTRAMUSCULAR; INTRAVENOUS AS NEEDED
Status: DISCONTINUED | OUTPATIENT
Start: 2022-06-22 | End: 2022-06-22 | Stop reason: HOSPADM

## 2022-06-22 RX ORDER — FENTANYL CITRATE 50 UG/ML
25 INJECTION, SOLUTION INTRAMUSCULAR; INTRAVENOUS
Status: COMPLETED | OUTPATIENT
Start: 2022-06-22 | End: 2022-06-22

## 2022-06-22 RX ORDER — ONDANSETRON 2 MG/ML
INJECTION INTRAMUSCULAR; INTRAVENOUS AS NEEDED
Status: DISCONTINUED | OUTPATIENT
Start: 2022-06-22 | End: 2022-06-22 | Stop reason: HOSPADM

## 2022-06-22 RX ORDER — SODIUM CHLORIDE 0.9 % (FLUSH) 0.9 %
5-40 SYRINGE (ML) INJECTION AS NEEDED
Status: DISCONTINUED | OUTPATIENT
Start: 2022-06-22 | End: 2022-06-22 | Stop reason: HOSPADM

## 2022-06-22 RX ORDER — MORPHINE SULFATE 2 MG/ML
2 INJECTION, SOLUTION INTRAMUSCULAR; INTRAVENOUS
Status: DISCONTINUED | OUTPATIENT
Start: 2022-06-22 | End: 2022-06-22 | Stop reason: HOSPADM

## 2022-06-22 RX ORDER — MIDAZOLAM HYDROCHLORIDE 1 MG/ML
1 INJECTION, SOLUTION INTRAMUSCULAR; INTRAVENOUS
Status: DISCONTINUED | OUTPATIENT
Start: 2022-06-22 | End: 2022-06-22 | Stop reason: HOSPADM

## 2022-06-22 RX ORDER — HYDRALAZINE HYDROCHLORIDE 20 MG/ML
5 INJECTION INTRAMUSCULAR; INTRAVENOUS
Status: DISCONTINUED | OUTPATIENT
Start: 2022-06-22 | End: 2022-06-27 | Stop reason: HOSPADM

## 2022-06-22 RX ORDER — SODIUM CHLORIDE, SODIUM LACTATE, POTASSIUM CHLORIDE, CALCIUM CHLORIDE 600; 310; 30; 20 MG/100ML; MG/100ML; MG/100ML; MG/100ML
125 INJECTION, SOLUTION INTRAVENOUS CONTINUOUS
Status: DISCONTINUED | OUTPATIENT
Start: 2022-06-22 | End: 2022-06-22 | Stop reason: HOSPADM

## 2022-06-22 RX ORDER — NORETHINDRONE AND ETHINYL ESTRADIOL 0.5-0.035
KIT ORAL AS NEEDED
Status: DISCONTINUED | OUTPATIENT
Start: 2022-06-22 | End: 2022-06-22 | Stop reason: HOSPADM

## 2022-06-22 RX ORDER — PROPOFOL 10 MG/ML
INJECTION, EMULSION INTRAVENOUS
Status: DISCONTINUED | OUTPATIENT
Start: 2022-06-22 | End: 2022-06-22 | Stop reason: HOSPADM

## 2022-06-22 RX ORDER — FENTANYL CITRATE 50 UG/ML
INJECTION, SOLUTION INTRAMUSCULAR; INTRAVENOUS AS NEEDED
Status: DISCONTINUED | OUTPATIENT
Start: 2022-06-22 | End: 2022-06-22 | Stop reason: HOSPADM

## 2022-06-22 RX ORDER — ACETAMINOPHEN 325 MG/1
650 TABLET ORAL ONCE
Status: COMPLETED | OUTPATIENT
Start: 2022-06-22 | End: 2022-06-22

## 2022-06-22 RX ORDER — MIDAZOLAM HYDROCHLORIDE 1 MG/ML
1 INJECTION, SOLUTION INTRAMUSCULAR; INTRAVENOUS AS NEEDED
Status: DISCONTINUED | OUTPATIENT
Start: 2022-06-22 | End: 2022-06-22 | Stop reason: HOSPADM

## 2022-06-22 RX ORDER — OXYCODONE HYDROCHLORIDE 5 MG/1
5 TABLET ORAL AS NEEDED
Status: DISCONTINUED | OUTPATIENT
Start: 2022-06-22 | End: 2022-06-22 | Stop reason: HOSPADM

## 2022-06-22 RX ORDER — DEXAMETHASONE SODIUM PHOSPHATE 4 MG/ML
INJECTION, SOLUTION INTRA-ARTICULAR; INTRALESIONAL; INTRAMUSCULAR; INTRAVENOUS; SOFT TISSUE AS NEEDED
Status: DISCONTINUED | OUTPATIENT
Start: 2022-06-22 | End: 2022-06-22 | Stop reason: HOSPADM

## 2022-06-22 RX ORDER — BUPIVACAINE HYDROCHLORIDE 5 MG/ML
INJECTION, SOLUTION EPIDURAL; INTRACAUDAL
Status: COMPLETED | OUTPATIENT
Start: 2022-06-22 | End: 2022-06-22

## 2022-06-22 RX ORDER — ONDANSETRON 2 MG/ML
4 INJECTION INTRAMUSCULAR; INTRAVENOUS AS NEEDED
Status: DISCONTINUED | OUTPATIENT
Start: 2022-06-22 | End: 2022-06-22 | Stop reason: HOSPADM

## 2022-06-22 RX ORDER — DIPHENHYDRAMINE HYDROCHLORIDE 50 MG/ML
12.5 INJECTION, SOLUTION INTRAMUSCULAR; INTRAVENOUS AS NEEDED
Status: DISCONTINUED | OUTPATIENT
Start: 2022-06-22 | End: 2022-06-22 | Stop reason: HOSPADM

## 2022-06-22 RX ORDER — PROPOFOL 10 MG/ML
INJECTION, EMULSION INTRAVENOUS AS NEEDED
Status: DISCONTINUED | OUTPATIENT
Start: 2022-06-22 | End: 2022-06-22 | Stop reason: HOSPADM

## 2022-06-22 RX ORDER — DEXTROSE, SODIUM CHLORIDE, SODIUM LACTATE, POTASSIUM CHLORIDE, AND CALCIUM CHLORIDE 5; .6; .31; .03; .02 G/100ML; G/100ML; G/100ML; G/100ML; G/100ML
125 INJECTION, SOLUTION INTRAVENOUS CONTINUOUS
Status: DISCONTINUED | OUTPATIENT
Start: 2022-06-22 | End: 2022-06-22 | Stop reason: HOSPADM

## 2022-06-22 RX ORDER — HYDROMORPHONE HYDROCHLORIDE 2 MG/ML
INJECTION, SOLUTION INTRAMUSCULAR; INTRAVENOUS; SUBCUTANEOUS AS NEEDED
Status: DISCONTINUED | OUTPATIENT
Start: 2022-06-22 | End: 2022-06-22 | Stop reason: HOSPADM

## 2022-06-22 RX ORDER — SODIUM CHLORIDE 0.9 % (FLUSH) 0.9 %
5-40 SYRINGE (ML) INJECTION EVERY 8 HOURS
Status: DISCONTINUED | OUTPATIENT
Start: 2022-06-22 | End: 2022-06-22 | Stop reason: HOSPADM

## 2022-06-22 RX ORDER — ROPIVACAINE HYDROCHLORIDE 5 MG/ML
30 INJECTION, SOLUTION EPIDURAL; INFILTRATION; PERINEURAL AS NEEDED
Status: DISCONTINUED | OUTPATIENT
Start: 2022-06-22 | End: 2022-06-22 | Stop reason: HOSPADM

## 2022-06-22 RX ORDER — HYDROMORPHONE HYDROCHLORIDE 1 MG/ML
INJECTION, SOLUTION INTRAMUSCULAR; INTRAVENOUS; SUBCUTANEOUS
Status: DISPENSED
Start: 2022-06-22 | End: 2022-06-23

## 2022-06-22 RX ADMIN — DEXAMETHASONE SODIUM PHOSPHATE 4 MG: 4 INJECTION, SOLUTION INTRAMUSCULAR; INTRAVENOUS at 17:35

## 2022-06-22 RX ADMIN — FENTANYL CITRATE 25 MCG: 50 INJECTION, SOLUTION INTRAMUSCULAR; INTRAVENOUS at 20:26

## 2022-06-22 RX ADMIN — SODIUM CHLORIDE, POTASSIUM CHLORIDE, SODIUM LACTATE AND CALCIUM CHLORIDE 125 ML/HR: 600; 310; 30; 20 INJECTION, SOLUTION INTRAVENOUS at 14:25

## 2022-06-22 RX ADMIN — HYDROMORPHONE HYDROCHLORIDE 0.5 MG: 1 INJECTION, SOLUTION INTRAMUSCULAR; INTRAVENOUS; SUBCUTANEOUS at 22:18

## 2022-06-22 RX ADMIN — HYDROMORPHONE HYDROCHLORIDE 0.5 MG: 2 INJECTION, SOLUTION INTRAMUSCULAR; INTRAVENOUS; SUBCUTANEOUS at 20:17

## 2022-06-22 RX ADMIN — PROPOFOL 20 MG: 10 INJECTION, EMULSION INTRAVENOUS at 16:30

## 2022-06-22 RX ADMIN — FENTANYL CITRATE 25 MCG: 0.05 INJECTION, SOLUTION INTRAMUSCULAR; INTRAVENOUS at 21:45

## 2022-06-22 RX ADMIN — PROPOFOL 20 MG: 10 INJECTION, EMULSION INTRAVENOUS at 16:36

## 2022-06-22 RX ADMIN — PROPOFOL 20 MG: 10 INJECTION, EMULSION INTRAVENOUS at 16:42

## 2022-06-22 RX ADMIN — EPHEDRINE SULFATE 5 MG: 50 INJECTION INTRAVENOUS at 17:49

## 2022-06-22 RX ADMIN — PROPOFOL 20 MG: 10 INJECTION, EMULSION INTRAVENOUS at 16:34

## 2022-06-22 RX ADMIN — FENTANYL CITRATE 25 MCG: 50 INJECTION, SOLUTION INTRAMUSCULAR; INTRAVENOUS at 20:19

## 2022-06-22 RX ADMIN — MIDAZOLAM 2 MG: 1 INJECTION INTRAMUSCULAR; INTRAVENOUS at 14:38

## 2022-06-22 RX ADMIN — PHENYLEPHRINE HYDROCHLORIDE 30 MCG/MIN: 10 INJECTION INTRAVENOUS at 16:51

## 2022-06-22 RX ADMIN — MIDAZOLAM 1 MG: 1 INJECTION INTRAMUSCULAR; INTRAVENOUS at 20:37

## 2022-06-22 RX ADMIN — ACETAMINOPHEN 650 MG: 325 TABLET ORAL at 14:25

## 2022-06-22 RX ADMIN — WATER 2 G: 1 INJECTION INTRAMUSCULAR; INTRAVENOUS; SUBCUTANEOUS at 17:00

## 2022-06-22 RX ADMIN — HYDROMORPHONE HYDROCHLORIDE 0.5 MG: 2 INJECTION, SOLUTION INTRAMUSCULAR; INTRAVENOUS; SUBCUTANEOUS at 20:36

## 2022-06-22 RX ADMIN — SODIUM CHLORIDE 125 ML/HR: 9 INJECTION, SOLUTION INTRAVENOUS at 23:51

## 2022-06-22 RX ADMIN — ONDANSETRON HYDROCHLORIDE 4 MG: 2 INJECTION, SOLUTION INTRAMUSCULAR; INTRAVENOUS at 17:35

## 2022-06-22 RX ADMIN — MIDAZOLAM 0.5 MG: 1 INJECTION INTRAMUSCULAR; INTRAVENOUS at 21:18

## 2022-06-22 RX ADMIN — FENTANYL CITRATE 25 MCG: 0.05 INJECTION, SOLUTION INTRAMUSCULAR; INTRAVENOUS at 21:20

## 2022-06-22 RX ADMIN — FENTANYL CITRATE 25 MCG: 50 INJECTION, SOLUTION INTRAMUSCULAR; INTRAVENOUS at 20:38

## 2022-06-22 RX ADMIN — FENTANYL CITRATE 25 MCG: 50 INJECTION, SOLUTION INTRAMUSCULAR; INTRAVENOUS at 20:15

## 2022-06-22 RX ADMIN — PROPOFOL 10 MG: 10 INJECTION, EMULSION INTRAVENOUS at 16:45

## 2022-06-22 RX ADMIN — HYDROMORPHONE HYDROCHLORIDE 0.5 MG: 1 INJECTION, SOLUTION INTRAMUSCULAR; INTRAVENOUS; SUBCUTANEOUS at 22:05

## 2022-06-22 RX ADMIN — BUPIVACAINE HYDROCHLORIDE 11 MG: 5 INJECTION, SOLUTION EPIDURAL; INTRACAUDAL; PERINEURAL at 16:50

## 2022-06-22 RX ADMIN — FENTANYL CITRATE 25 MCG: 0.05 INJECTION, SOLUTION INTRAMUSCULAR; INTRAVENOUS at 21:37

## 2022-06-22 RX ADMIN — FENTANYL CITRATE 25 MCG: 0.05 INJECTION, SOLUTION INTRAMUSCULAR; INTRAVENOUS at 21:26

## 2022-06-22 RX ADMIN — SODIUM CHLORIDE 125 ML/HR: 9 INJECTION, SOLUTION INTRAVENOUS at 20:59

## 2022-06-22 RX ADMIN — ROPIVACAINE HYDROCHLORIDE 30 ML: 5 INJECTION, SOLUTION EPIDURAL; INFILTRATION; PERINEURAL at 14:44

## 2022-06-22 RX ADMIN — FENTANYL CITRATE 50 MCG: 0.05 INJECTION, SOLUTION INTRAMUSCULAR; INTRAVENOUS at 14:38

## 2022-06-22 RX ADMIN — PROPOFOL 30 MCG/KG/MIN: 10 INJECTION, EMULSION INTRAVENOUS at 16:51

## 2022-06-22 RX ADMIN — PROPOFOL 20 MG: 10 INJECTION, EMULSION INTRAVENOUS at 16:37

## 2022-06-22 RX ADMIN — HYDRALAZINE HYDROCHLORIDE 5 MG: 20 INJECTION INTRAMUSCULAR; INTRAVENOUS at 21:15

## 2022-06-22 NOTE — PERIOP NOTES
Spoke with patient's son Lourdes Diana ([phone]) update im on patient's status and start of surgery

## 2022-06-22 NOTE — ANESTHESIA PREPROCEDURE EVALUATION
Anesthetic History   No history of anesthetic complications            Review of Systems / Medical History  Patient summary reviewed, nursing notes reviewed and pertinent labs reviewed    Pulmonary        Sleep apnea           Neuro/Psych     seizures: well controlled  CVA  TIA     Cardiovascular    Hypertension        Dysrhythmias : atrial fibrillation  Hyperlipidemia    Exercise tolerance: <4 METS     GI/Hepatic/Renal     GERD           Endo/Other    Diabetes    Blood dyscrasia, arthritis and cancer     Other Findings   Comments: Multiple myeloma (HCC) (C90.00)           Physical Exam    Airway  Mallampati: II  TM Distance: 4 - 6 cm  Neck ROM: normal range of motion   Mouth opening: Normal     Cardiovascular  Regular rate and rhythm,  S1 and S2 normal,  no murmur, click, rub, or gallop  Rhythm: regular  Rate: normal         Dental    Dentition: Upper partial plate     Pulmonary  Breath sounds clear to auscultation               Abdominal  GI exam deferred       Other Findings            Anesthetic Plan    ASA: 3  Anesthesia type: spinal      Post-op pain plan if not by surgeon: peripheral nerve block single    Induction: Intravenous  Anesthetic plan and risks discussed with: Patient

## 2022-06-22 NOTE — ANESTHESIA PROCEDURE NOTES
Spinal Block    Start time: 6/22/2022 4:35 PM  End time: 6/22/2022 4:50 PM  Performed by: Jeanie Gutierrez MD  Authorized by: Jeanie Gutierrez MD     Pre-procedure:   Indications: at surgeon's request and primary anesthetic  Preanesthetic Checklist: patient identified, risks and benefits discussed, anesthesia consent, site marked, patient being monitored and timeout performed    Timeout Time: 16:35 EDT          Spinal Block:   Patient Position:  Seated  Prep Region:  Lumbar  Prep: Betadine      Location:  L2-3  Technique:  Single shot        Needle:       Attempts:  3      Events: CSF confirmed        Assessment:  Insertion:  Uncomplicated  Patient tolerance:  Patient tolerated the procedure well with no immediate complications

## 2022-06-22 NOTE — ANESTHESIA PROCEDURE NOTES
Peripheral Block    Performed by: Aakash Abreu DO  Authorized by: Aakash Abreu DO       Pre-procedure: Indications: at surgeon's request and post-op pain management    Preanesthetic Checklist: patient identified, risks and benefits discussed, site marked, timeout performed and patient being monitored      Block Type:   Block Type:   Adductor canal  Laterality:  Right  Monitoring:  Standard ASA monitoring, continuous pulse ox, frequent vital sign checks, heart rate, responsive to questions and oxygen  Injection Technique:  Single shot  Procedures: ultrasound guided    Patient Position: supine  Prep: chlorhexidine    Needle Type:  Stimuplex  Needle Gauge:  22 G  Needle Localization:  Ultrasound guidance  Medication Injected:  Ropivacaine (PF) (NAROPIN)(0.5%) 5 mg/mL injection, 30 mL  Med Admin Time: 6/22/2022 2:44 PM    Assessment:  Number of attempts:  1  Injection Assessment:  Incremental injection every 5 mL, local visualized surrounding nerve on ultrasound, negative aspiration for blood, no paresthesia, no intravascular symptoms and ultrasound image on chart  Patient tolerance:  Patient tolerated the procedure well with no immediate complications

## 2022-06-23 LAB
ANION GAP SERPL CALC-SCNC: 9 MMOL/L (ref 5–15)
BUN SERPL-MCNC: 22 MG/DL (ref 6–20)
BUN/CREAT SERPL: 18 (ref 12–20)
CALCIUM SERPL-MCNC: 8.2 MG/DL (ref 8.5–10.1)
CHLORIDE SERPL-SCNC: 111 MMOL/L (ref 97–108)
CO2 SERPL-SCNC: 20 MMOL/L (ref 21–32)
CREAT SERPL-MCNC: 1.24 MG/DL (ref 0.55–1.02)
GLUCOSE BLD STRIP.AUTO-MCNC: 184 MG/DL (ref 65–117)
GLUCOSE BLD STRIP.AUTO-MCNC: 191 MG/DL (ref 65–117)
GLUCOSE SERPL-MCNC: 180 MG/DL (ref 65–100)
HGB BLD-MCNC: 8.7 G/DL (ref 11.5–16)
POTASSIUM SERPL-SCNC: 4.1 MMOL/L (ref 3.5–5.1)
SERVICE CMNT-IMP: ABNORMAL
SERVICE CMNT-IMP: ABNORMAL
SODIUM SERPL-SCNC: 140 MMOL/L (ref 136–145)

## 2022-06-23 PROCEDURE — 80048 BASIC METABOLIC PNL TOTAL CA: CPT

## 2022-06-23 PROCEDURE — 36415 COLL VENOUS BLD VENIPUNCTURE: CPT

## 2022-06-23 PROCEDURE — 74011250636 HC RX REV CODE- 250/636: Performed by: PHYSICIAN ASSISTANT

## 2022-06-23 PROCEDURE — 74011250637 HC RX REV CODE- 250/637: Performed by: ORTHOPAEDIC SURGERY

## 2022-06-23 PROCEDURE — 74011250637 HC RX REV CODE- 250/637: Performed by: PHYSICIAN ASSISTANT

## 2022-06-23 PROCEDURE — 74011000250 HC RX REV CODE- 250: Performed by: PHYSICIAN ASSISTANT

## 2022-06-23 PROCEDURE — 97165 OT EVAL LOW COMPLEX 30 MIN: CPT

## 2022-06-23 PROCEDURE — 85018 HEMOGLOBIN: CPT

## 2022-06-23 PROCEDURE — 74011636637 HC RX REV CODE- 636/637

## 2022-06-23 PROCEDURE — 97535 SELF CARE MNGMENT TRAINING: CPT

## 2022-06-23 PROCEDURE — 74011250636 HC RX REV CODE- 250/636

## 2022-06-23 PROCEDURE — 82962 GLUCOSE BLOOD TEST: CPT

## 2022-06-23 PROCEDURE — 65270000029 HC RM PRIVATE

## 2022-06-23 RX ORDER — MAGNESIUM SULFATE 100 %
4 CRYSTALS MISCELLANEOUS AS NEEDED
Status: DISCONTINUED | OUTPATIENT
Start: 2022-06-23 | End: 2022-06-27 | Stop reason: HOSPADM

## 2022-06-23 RX ORDER — SODIUM CHLORIDE 0.9 % (FLUSH) 0.9 %
5-40 SYRINGE (ML) INJECTION EVERY 8 HOURS
Status: DISCONTINUED | OUTPATIENT
Start: 2022-06-23 | End: 2022-06-27 | Stop reason: HOSPADM

## 2022-06-23 RX ORDER — OXYCODONE HYDROCHLORIDE 5 MG/1
5 TABLET ORAL
Status: DISCONTINUED | OUTPATIENT
Start: 2022-06-23 | End: 2022-06-24

## 2022-06-23 RX ORDER — ATORVASTATIN CALCIUM 20 MG/1
20 TABLET, FILM COATED ORAL EVERY MORNING
Status: DISCONTINUED | OUTPATIENT
Start: 2022-06-23 | End: 2022-06-27 | Stop reason: HOSPADM

## 2022-06-23 RX ORDER — PROCHLORPERAZINE EDISYLATE 5 MG/ML
5 INJECTION INTRAMUSCULAR; INTRAVENOUS
Status: ACTIVE | OUTPATIENT
Start: 2022-06-23 | End: 2022-06-25

## 2022-06-23 RX ORDER — METOPROLOL SUCCINATE 25 MG/1
100 TABLET, EXTENDED RELEASE ORAL EVERY MORNING
Status: DISCONTINUED | OUTPATIENT
Start: 2022-06-23 | End: 2022-06-27

## 2022-06-23 RX ORDER — HYDROMORPHONE HYDROCHLORIDE 1 MG/ML
0.5 INJECTION, SOLUTION INTRAMUSCULAR; INTRAVENOUS; SUBCUTANEOUS
Status: ACTIVE | OUTPATIENT
Start: 2022-06-23 | End: 2022-06-23

## 2022-06-23 RX ORDER — PANTOPRAZOLE SODIUM 40 MG/1
40 TABLET, DELAYED RELEASE ORAL
Status: DISCONTINUED | OUTPATIENT
Start: 2022-06-23 | End: 2022-06-27 | Stop reason: HOSPADM

## 2022-06-23 RX ORDER — ONDANSETRON 2 MG/ML
4 INJECTION INTRAMUSCULAR; INTRAVENOUS
Status: ACTIVE | OUTPATIENT
Start: 2022-06-23 | End: 2022-06-25

## 2022-06-23 RX ORDER — AMOXICILLIN 250 MG
1 CAPSULE ORAL 2 TIMES DAILY
Status: DISCONTINUED | OUTPATIENT
Start: 2022-06-23 | End: 2022-06-27 | Stop reason: HOSPADM

## 2022-06-23 RX ORDER — OXYMETAZOLINE HCL 0.05 %
1 SPRAY, NON-AEROSOL (ML) NASAL
Status: DISCONTINUED | OUTPATIENT
Start: 2022-06-23 | End: 2022-06-27 | Stop reason: HOSPADM

## 2022-06-23 RX ORDER — SPIRONOLACTONE 25 MG/1
25 TABLET ORAL EVERY MORNING
Status: DISCONTINUED | OUTPATIENT
Start: 2022-06-23 | End: 2022-06-27

## 2022-06-23 RX ORDER — INSULIN LISPRO 100 [IU]/ML
INJECTION, SOLUTION INTRAVENOUS; SUBCUTANEOUS
Status: DISCONTINUED | OUTPATIENT
Start: 2022-06-23 | End: 2022-06-27 | Stop reason: HOSPADM

## 2022-06-23 RX ORDER — POTASSIUM CHLORIDE 750 MG/1
10 TABLET, FILM COATED, EXTENDED RELEASE ORAL 2 TIMES DAILY
Status: DISCONTINUED | OUTPATIENT
Start: 2022-06-23 | End: 2022-06-27 | Stop reason: HOSPADM

## 2022-06-23 RX ORDER — HYDROXYZINE HYDROCHLORIDE 10 MG/1
10 TABLET, FILM COATED ORAL
Status: DISCONTINUED | OUTPATIENT
Start: 2022-06-23 | End: 2022-06-27 | Stop reason: HOSPADM

## 2022-06-23 RX ORDER — FACIAL-BODY WIPES
10 EACH TOPICAL DAILY PRN
Status: DISCONTINUED | OUTPATIENT
Start: 2022-06-25 | End: 2022-06-27 | Stop reason: HOSPADM

## 2022-06-23 RX ORDER — ACETAMINOPHEN 500 MG
1000 TABLET ORAL EVERY 6 HOURS
Status: DISCONTINUED | OUTPATIENT
Start: 2022-06-23 | End: 2022-06-27 | Stop reason: HOSPADM

## 2022-06-23 RX ORDER — OXYCODONE HYDROCHLORIDE 5 MG/1
10 TABLET ORAL
Status: DISCONTINUED | OUTPATIENT
Start: 2022-06-23 | End: 2022-06-24

## 2022-06-23 RX ORDER — POLYETHYLENE GLYCOL 3350 17 G/17G
17 POWDER, FOR SOLUTION ORAL DAILY
Status: DISCONTINUED | OUTPATIENT
Start: 2022-06-23 | End: 2022-06-27 | Stop reason: HOSPADM

## 2022-06-23 RX ORDER — LEVETIRACETAM 500 MG/1
500 TABLET ORAL EVERY 12 HOURS
Status: DISCONTINUED | OUTPATIENT
Start: 2022-06-23 | End: 2022-06-27 | Stop reason: HOSPADM

## 2022-06-23 RX ORDER — SODIUM CHLORIDE 0.9 % (FLUSH) 0.9 %
5-40 SYRINGE (ML) INJECTION AS NEEDED
Status: DISCONTINUED | OUTPATIENT
Start: 2022-06-23 | End: 2022-06-27 | Stop reason: HOSPADM

## 2022-06-23 RX ORDER — LEVETIRACETAM 500 MG/1
1000 TABLET, EXTENDED RELEASE ORAL
Status: DISCONTINUED | OUTPATIENT
Start: 2022-06-23 | End: 2022-06-23 | Stop reason: CLARIF

## 2022-06-23 RX ORDER — METFORMIN HYDROCHLORIDE 500 MG/1
500 TABLET ORAL
Status: DISCONTINUED | OUTPATIENT
Start: 2022-06-23 | End: 2022-06-23

## 2022-06-23 RX ORDER — NALOXONE HYDROCHLORIDE 0.4 MG/ML
0.4 INJECTION, SOLUTION INTRAMUSCULAR; INTRAVENOUS; SUBCUTANEOUS AS NEEDED
Status: DISCONTINUED | OUTPATIENT
Start: 2022-06-23 | End: 2022-06-27 | Stop reason: HOSPADM

## 2022-06-23 RX ADMIN — ACETAMINOPHEN 1000 MG: 500 TABLET ORAL at 21:30

## 2022-06-23 RX ADMIN — SODIUM CHLORIDE 500 ML: 9 INJECTION, SOLUTION INTRAVENOUS at 14:25

## 2022-06-23 RX ADMIN — HYDROMORPHONE HYDROCHLORIDE 1 MG: 1 INJECTION, SOLUTION INTRAMUSCULAR; INTRAVENOUS; SUBCUTANEOUS at 14:05

## 2022-06-23 RX ADMIN — HYDROMORPHONE HYDROCHLORIDE 1 MG: 1 INJECTION, SOLUTION INTRAMUSCULAR; INTRAVENOUS; SUBCUTANEOUS at 04:59

## 2022-06-23 RX ADMIN — LEVETIRACETAM 500 MG: 500 TABLET, FILM COATED ORAL at 09:59

## 2022-06-23 RX ADMIN — ATORVASTATIN CALCIUM 20 MG: 20 TABLET, FILM COATED ORAL at 09:59

## 2022-06-23 RX ADMIN — Medication 2 UNITS: at 19:00

## 2022-06-23 RX ADMIN — SENNOSIDES AND DOCUSATE SODIUM 1 TABLET: 50; 8.6 TABLET ORAL at 09:59

## 2022-06-23 RX ADMIN — OXYCODONE 10 MG: 5 TABLET ORAL at 10:01

## 2022-06-23 RX ADMIN — PANTOPRAZOLE SODIUM 40 MG: 40 TABLET, DELAYED RELEASE ORAL at 09:58

## 2022-06-23 RX ADMIN — RIVAROXABAN 10 MG: 10 TABLET, FILM COATED ORAL at 09:58

## 2022-06-23 RX ADMIN — LEVETIRACETAM 500 MG: 500 TABLET, FILM COATED ORAL at 21:29

## 2022-06-23 RX ADMIN — POTASSIUM CHLORIDE 10 MEQ: 750 TABLET, FILM COATED, EXTENDED RELEASE ORAL at 19:06

## 2022-06-23 RX ADMIN — SENNOSIDES AND DOCUSATE SODIUM 1 TABLET: 50; 8.6 TABLET ORAL at 19:06

## 2022-06-23 RX ADMIN — WATER 2 G: 1 INJECTION INTRAMUSCULAR; INTRAVENOUS; SUBCUTANEOUS at 10:04

## 2022-06-23 RX ADMIN — SODIUM CHLORIDE 125 ML/HR: 9 INJECTION, SOLUTION INTRAVENOUS at 19:08

## 2022-06-23 RX ADMIN — HYDROMORPHONE HYDROCHLORIDE 1 MG: 1 INJECTION, SOLUTION INTRAMUSCULAR; INTRAVENOUS; SUBCUTANEOUS at 02:00

## 2022-06-23 RX ADMIN — OXYCODONE 10 MG: 5 TABLET ORAL at 21:30

## 2022-06-23 RX ADMIN — WATER 2 G: 1 INJECTION INTRAMUSCULAR; INTRAVENOUS; SUBCUTANEOUS at 19:06

## 2022-06-23 RX ADMIN — METOPROLOL SUCCINATE 100 MG: 25 TABLET, EXTENDED RELEASE ORAL at 09:58

## 2022-06-23 RX ADMIN — ACETAMINOPHEN 1000 MG: 500 TABLET ORAL at 14:05

## 2022-06-23 RX ADMIN — ACETAMINOPHEN 1000 MG: 500 TABLET ORAL at 09:59

## 2022-06-23 NOTE — PROGRESS NOTES
Spiritual Care Assessment/Progress Note  [facility]      NAME: Yuko Ken      MRN: [id number]  AGE: 66 y.o. SEX: female  Presybeterian Affiliation: Catholic   Language: English     6/23/2022     Total Time (in minutes): 29     Spiritual Assessment begun in [address] through conversation with:         []Patient        [] Family    [] Friend(s)        Reason for Consult: Advance medical directive consult,Initial visit     Spiritual beliefs: (Please include comment if needed)     [] Identifies with a joelle tradition:         [] Supported by a joelle community:            [] Claims no spiritual orientation:           [] Seeking spiritual identity:                [] Adheres to an individual form of spirituality:           [x] Not able to assess:                           Identified resources for coping:      [] Prayer                               [] Music                  [] Guided Imagery     [] Family/friends                 [] Pet visits     [] Devotional reading                         [x] Unknown     [] Other:                                               Interventions offered during this visit: (See comments for more details)                Plan of Care:     [] Support spiritual and/or cultural needs    [] Support AMD and/or advance care planning process      [] Support grieving process   [] Coordinate Rites and/or Rituals    [] Coordination with community clergy   [] No spiritual needs identified at this time   [] Detailed Plan of Care below (See Comments)  [] Make referral to Music Therapy  [] Make referral to Pet Therapy     [] Make referral to Addiction services  [] Make referral to [facility]  [] Make referral to Spiritual Care Partner  [] No future visits requested        [] Contact Spiritual Care for further referrals     Comments: In Basket request for advance directive for 554. Patient appeared to be sleeping.  left booklet, documentation, and visit care.  Provided [facility] presence and silent prayer. Chart reviewed. Please contact Spiritual Care for any further referrals. Visited by: Leander Spicer.  Velia Watts, [address] [place] [address] Road paging Service [phone])

## 2022-06-23 NOTE — ANESTHESIA POSTPROCEDURE EVALUATION
Procedure(s):  RIGHT TOTAL KNEE ARTHROPLASTY REVISION. spinal    Anesthesia Post Evaluation      Multimodal analgesia: multimodal analgesia used between 6 hours prior to anesthesia start to PACU discharge  Patient location during evaluation: PACU  Patient participation: complete - patient participated  Level of consciousness: awake  Pain score: 2  Pain management: adequate  Airway patency: patent  Anesthetic complications: no  Cardiovascular status: acceptable  Respiratory status: acceptable  Hydration status: acceptable  Comments: I have evaluated the patient and meets criteria for discharge from PACU. Rosey Prader., MD  Post anesthesia nausea and vomiting:  controlled  Final Post Anesthesia Temperature Assessment:  Normothermia (36.0-37.5 degrees C)      INITIAL Post-op Vital signs:   Vitals Value Taken Time   /81 06/22/22 2200   Temp 36.4 °C (97.6 °F) 06/22/22 2125   Pulse 70 06/22/22 2210   Resp 15 06/22/22 2210   SpO2 99 % 06/22/22 2210   Vitals shown include unvalidated device data.

## 2022-06-23 NOTE — BRIEF OP NOTE
Brief Postoperative Note    Patient: Len Joyce  YOB: 1950  MRN: [id number]    Date of Procedure: 6/22/2022     Pre-Op Diagnosis: Prosthetic knee implant failure, subsequent encounter     Post-Op Diagnosis: Same as preoperative diagnosis. Procedure(s):  RIGHT TOTAL KNEE ARTHROPLASTY REVISION Both Components    Surgeon(s):  Rodney Parra MD    Surgical Assistant: Physician Assistant: Gela Aragon PA-C  Surg Asst-1: John Chang    Anesthesia: Spinal     Estimated Blood Loss (mL): 627     Complications: None    Specimens: * No specimens in log *     Implants:   Implant Name Type Inv.  Item Serial No.  Lot No. LRB No. Used Action   CEMENT BNE 40GM FULL DOSE PMMA W/ GENT HI VISC RADPQ LNG - SNA  CEMENT BNE 40GM FULL DOSE PMMA W/ GENT HI VISC RADPQ LNG NA [employer] [facility] [id number] Right 2 Implanted   COMPONENT FEM SZ 2.5 AP59MM ML63MM R KNEE CO CHROM CRUCE - SNA  COMPONENT FEM SZ 2.5 AP59MM ML63MM R KNEE CO CHROM CRUCE NA [facility] [employer] [facility] [id number] Right 1 Implanted   ADAPTER FEM +2/-2MM OFFSET BOLT PFC SIG - SNA  ADAPTER FEM +2/-2MM OFFSET BOLT PFC SIG NA [employer] [facility] [id number] Right 1 Implanted   ADAPTER FEM 5DEG KNEE PFC SIG - SNA  ADAPTER FEM 5DEG KNEE PFC SIG NA [employer] [facility] [id number] Right 1 Implanted   SLEEVE FEM L40MM UNIV MTPHSEAL TI PORCOAT FULL POR LPS - SNA  SLEEVE FEM L40MM UNIV MTPHSEAL TI PORCOAT FULL POR LPS NA [employer] [facility] [id number] Right 1 Implanted   AUGMENT FEM SZ 2.5 THK8MM R DST TI PFC SIG - SNA  AUGMENT FEM SZ 2.5 THK8MM R DST TI PFC SIG NA [employer] [id number] Right 1 Implanted   AUGMENT FEM SZ 2.5 THK8MM STD POST TI SIMEON PFC SIG - SNA  AUGMENT FEM SZ 2.5 THK8MM STD POST TI SIMEON PFC SIG NA [employer] [facility] [id number] Right 2 Implanted   STEM FEM L115MM [id number] UNIV KNEE FLUT PRESSFIT FOR ROT HNG - SNA  STEM FEM L115MM [id number] UNIV KNEE FLUT PRESSFIT FOR ROT HNG NA [facility] [employer]_ [id number] Right 1 Implanted   ADAPTER FEM NEUT KNEE BOLT PFC SIG - SNA  ADAPTER FEM NEUT KNEE BOLT PFC SIG NA [facility] [employer] [facility] [facility] [id number] Right 1 Implanted   AUGMENT FEM SZ 2.5MM THK4MM R DST KNEE TI BLK MARI SIMEON PFC - SNA  AUGMENT FEM SZ 2.5MM THK4MM R DST KNEE TI BLK MARI SIMEON PFC NA [facility] [employer] [facility] [id number] Right 1 Implanted   ANCHOR SUT L14.7MM DIA5. 5MM BIOCOMPOSITE W/ 3 SZ 2 - SNA  ANCHOR SUT L14.7MM DIA5. 5MM BIOCOMPOSITE W/ 3 SZ 2 NA [employer]_ [id number] Right 2 Implanted   INSERT TIB SZ 2.5 THK17.5MM AP43MM ML67MM UNIV UHMWPE MARI - SNA  INSERT TIB SZ 2.5 THK17.5MM AP43MM ML67MM UNIV UHMWPE MARI NA [facility] [employer]_ [id number] Right 1 Implanted       Drains: * No LDAs found *    Findings: Loose femoral component.   Undersized tibial poly    Electronically Signed by Teo Reynolds MD on 6/22/2022 at 8:07 PM

## 2022-06-23 NOTE — PROGRESS NOTES
Problem: Self Care Deficits Care Plan (Adult)  Goal: *Acute Goals and Plan of Care (Insert Text)  Description: FUNCTIONAL STATUS PRIOR TO ADMISSION: Patient was independent and active without use of DME.     HOME SUPPORT: The patient lives alone with daughter in the area. Occupational Therapy Goals  Initiated 6/23/2022  1. Patient will perform grooming with modified independence within 7 day(s). 2.  Patient will perform bathing with modified independence within 7 day(s). 3.  Patient will perform upper body dressing and lower body dressing with modified independence within 7 day(s). 4.  Patient will perform toilet transfers with modified independence within 7 day(s). 5.  Patient will perform all aspects of toileting with modified independence within 7 day(s). 6.  Patient will utilize energy conservation techniques during functional activities with verbal cues within 7 day(s). Outcome: Not Met   OCCUPATIONAL THERAPY EVALUATION  Patient: Leobardo Garcia (35 y.o. 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) [T84.012A]  Procedure(s) (LRB):  RIGHT TOTAL KNEE ARTHROPLASTY REVISION (Right) 1 Day Post-Op   Precautions:  Fall    ASSESSMENT  Based on the objective data described below, the patient presents with decreased self care and functional mobility d/t syncopal episode, pain, decreased ROM, strength, LB access and knee immobilizer s/p RTKA revision. Pt reports multiple knee surgeries in PMH and baseline independence with ADLs/IADLs with no AE. Pt received supine in bed with pain, RN notified. Pt agreeable to therapy. Pt completed bed mobility with mod A X2 to reach EOB. RN entered and provided pain medication at EOB. Pt O2 at 98%. Pt completed sit to stand with min A and transferred to [facility]. Pt voided 100 cc. Pt began completing bladder hygiene and began to c/o of dizziness.  Pt progressed to LOC on [facility], therapist elevated pt feet and medical team completed T transfer back to bed, total A to return to supine. RN and NP aware and assisted with care of pt. Pt A&Ox4. Pt would benefit from continued skilled OT during acute hospitalization for ADL/IADL training. D/c recommendation SNF vs. [facility] pending progress. Current Level of Function Impacting Discharge (ADLs/self-care): min-total A    Functional Outcome Measure: The patient scored Total: 50/100 on the Barthel Index outcome measure which is indicative of being partially dependent in basic self-care. Other factors to consider for discharge: immobilizer; limited home support     Patient will benefit from skilled therapy intervention to address the above noted impairments. PLAN :  Recommendations and Planned Interventions: self care training, functional mobility training, therapeutic exercise, balance training, therapeutic activities, endurance activities, patient education, home safety training, and family training/education    Frequency/Duration: Patient will be followed by occupational therapy 5 times a week to address goals. Recommendation for discharge: (in order for the patient to meet his/her long term goals)  Therapy up to 5 days/week in SNF setting vs. Home with assist and [facility], pending progress    This discharge recommendation:  Has been made in collaboration with the attending provider and/or case management    IF patient discharges home will need the following DME: TBD       SUBJECTIVE:   Patient stated this is my 11th surgery on this leg.     OBJECTIVE DATA SUMMARY:   HISTORY:   Past Medical History:   Diagnosis Date    A-fib ([facility]     Arthritis     [facility]     GERD (gastroesophageal reflux disease)     Hypercholesterolemia     Hypertension     Ill-defined condition     cellulitis    Multiple myeloma ([facility]     WITH MGUS    Prediabetes     Seizures ([facility] 2016    PER NEUROLOGIST ABSENCE SEIZURES, patient states    Sleep apnea     TESTED-DOESN'T KNOW RESULTS    Stroke [facility]) 2006    TIA    Syncope and collapse     loop recorder placed 4/2016 (syncope of unknown etiology)    Thromboembolus ([address]     ANKLE     Past Surgical History:   Procedure Laterality Date    HX APPENDECTOMY      REMOVED DURING HYSTERECTOMY    HX COLONOSCOPY      HX DILATION AND CURETTAGE      HX HYSTERECTOMY  1996    HX IMPLANTABLE LOOP RECORDER  04/29/2016    HX KNEE ARTHROSCOPY  12/17/2009/ AND 2008    RIGHT KNEE    HX KNEE REPLACEMENT Right 2013 2016    HX ORTHOPAEDIC  9/09 AND  5/24/2011    RIGHT TOTAL KNEE, L-knee replacement    HX ORTHOPAEDIC  1/31/2011   10/24/2011    REMOVED KNEE REPLACEMENT    HX ORTHOPAEDIC Left 09/05/2017    HAND    HX ORTHOPAEDIC      RT TKR REVISIONS X4, LEFT TKR  REVISION X1    HX ROTATOR CUFF REPAIR Right 2008    HX SHOULDER ARTHROSCOPY  2003    RIGHT    HX WRIST FRACTURE TX Left     IR REPAIR CVC W/O PORT OR PUMP  @2020       Expanded or extensive additional review of patient history:     Home Situation  Home Environment: Trailer/mobile home  # Steps to Enter: 6  Rails to Enter: Yes  One/Two Story Residence: One story  Living Alone: Yes  Support Systems: Other Family Member(s)  Patient Expects to be Discharged to[de-identified] Skilled nursing facility  Current DME Used/Available at Home: Shower chair,Walker  Tub or Shower Type: Tub/Shower combination      EXAMINATION OF PERFORMANCE DEFICITS:  Cognitive/Behavioral Status:  Neurologic State: Alert  Orientation Level: Oriented X4  Cognition: Appropriate decision making; Appropriate for age attention/concentration; Appropriate safety awareness; Follows commands  Perception: Appears intact  Perseveration: No perseveration noted  Safety/Judgement: Awareness of environment    Skin: see nursing note    Edema: none noted    Hearing:   Auditory  Auditory Impairment: None    Vision/Perceptual:                                Corrective Lenses: Glasses    Range of Motion:    AROM: Generally decreased, functional  PROM: Generally decreased, functional                      Strength:    Strength: Generally decreased, functional                Coordination:  Coordination: Within functional limits  Fine Motor Skills-Upper: Left Intact; Right Intact    Gross Motor Skills-Upper: Left Intact; Right Intact    Tone & Sensation:    Tone: Normal  Sensation: Intact                      Balance:  Sitting: Impaired; With support (RLE propped up to maintain ext)  Sitting - Static: Good (unsupported)  Sitting - Dynamic: Fair (occasional)  Standing: Impaired; With support  Standing - Static: Fair;Constant support  Standing - Dynamic : Fair;Constant support    Functional Mobility and Transfers for ADLs:  Bed Mobility:  Supine to Sit: Moderate assistance;Assist x2  Sit to Supine: Total assistance (T tranfer back to bed)  Scooting: Minimum assistance    Transfers:  Sit to Stand: Minimum assistance  Stand to Sit: Minimum assistance  Bed to Chair: Minimum assistance  Toilet Transfer : Minimum assistance    ADL Assessment:  Feeding: Independent    Oral Facial Hygiene/Grooming: Setup    Bathing: Minimum assistance    Type of Bath: Basin/Soap/Water;Partial    Upper Body Dressing: Setup    Lower Body Dressing: Moderate assistance    Toileting: Moderate assistance                ADL Intervention and task modifications:       Pt received supine in bed. Pt reported 9/10 pain however agreeable to therapy to reach EOB; RN notified of pain and following. Pt completed supine to sit with mod A x2. Pt sat EOB with foot elevated; RN entered room and provided pain medication EOB. Pt interested and agreeable to [facility] transfer. Pt completed sit to stand with min A and transferred to [facility] with min A. Pt voided, 100 cc. Pt began bladder hygiene and c/o dizziness. Pt slowly regressed with responses to verbal and tactile stimulation, pt LOC on BSC, therapist elevated pt feet and team completed t-transfer back to bed.  Pt regained consciousness in supine with bed in trendelenberg; vitals obtained by NP ~90s/40s. NP and RN in room. Total A for toilet hygiene and pt left supine with rails X3 and call bell in reach. Toileting  Toileting Assistance: Total assistance(dependent)  Bladder Hygiene: Total assistance (dependent)  Clothing Management: Minimum assistance  Cues: Tactile cues provided;Verbal cues provided;Visual cues provided    Cognitive Retraining  Safety/Judgement: Awareness of environment         Functional Measure:    Barthel Index:  Bathin  Bladder: 10  Bowels: 10  Groomin  Dressin  Feeding: 10  Mobility: 0  Stairs: 0  Toilet Use: 5  Transfer (Bed to Chair and Back): 10  Total: 50/100      The Barthel ADL Index: Guidelines  1. The index should be used as a record of what a patient does, not as a record of what a patient could do. 2. The main aim is to establish degree of independence from any help, physical or verbal, however minor and for whatever reason. 3. The need for supervision renders the patient not independent. 4. A patient's performance should be established using the best available evidence. Asking the patient, friends/relatives and nurses are the usual sources, but direct observation and common sense are also important. However direct testing is not needed. 5. Usually the patient's performance over the preceding 24-48 hours is important, but occasionally longer periods will be relevant. 6. Middle categories imply that the patient supplies over 50 per cent of the effort. 7. Use of aids to be independent is allowed. Score Interpretation (from [address] [facility] [address] [facility])    Independent   60-79 Minimally independent   40-59 Partially dependent   20-39 Very dependent   <20 Totally dependent     -Magalys Lubin., Barthel, D.W. (1965). Functional evaluation: the Barthel Index. [address] ([address] (). The Barthel activities of daily living index: self-reporting versus actual performance in the old (> or = 75 years). Journal of [address] [place] [address] 457), [facility], ShannanANTONIETA, Jacqui Mims., Romeo Frankel. (1999). Measuring the change in disability after inpatient rehabilitation; comparison of the responsiveness of the Barthel Index and Functional [place] Measure. Journal of Neurology, Neurosurgery, and Psychiatry, 66(4), 681-595. MARCELA Sorto, SHELDON Padilla, & Reji Santana M.A. (2004) Assessment of post-stroke quality of life in cost-effectiveness studies: The usefulness of the Barthel Index and the EuroQoL-5D. Quality of Life Research, 15, 186-45     Occupational Therapy Evaluation Charge Determination   History Examination Decision-Making   LOW Complexity : Brief history review  MEDIUM Complexity : 3-5 performance deficits relating to physical, cognitive , or psychosocial skils that result in activity limitations and / or participation restrictions MEDIUM Complexity : Patient may present with comorbidities that affect occupational performnce. Miniml to moderate modification of tasks or assistance (eg, physical or verbal ) with assesment(s) is necessary to enable patient to complete evaluation       Based on the above components, the patient evaluation is determined to be of the following complexity level: LOW   Pain Rating:  Pt reported 9/10; RN following; RN provided medication during session     Activity Tolerance:   Poor and signs and symptoms of orthostatic hypotension    After treatment patient left in no apparent distress:    Supine in bed, Call bell within reach, and Side rails x 3    COMMUNICATION/EDUCATION:   The patients plan of care was discussed with: Physical therapist, Registered nurse, and NP . Home safety education was provided and the patient/caregiver indicated understanding. and Patient/family have participated as able in goal setting and plan of care. This patients plan of care is appropriate for delegation to hospitals.     Thank you for this referral.  VONNIE Duran  Time Calculation: 43 mins     Regarding student involvement in patient care:  A student participated in this treatment session. Per CMS Medicare statements and AOTA guidelines I certify that the following was true:  1. I was present and directly observed the entire session. 2. I made all skilled judgments and clinical decisions regarding care. 3. I am the practitioner responsible for assessment, treatment, and documentation.

## 2022-06-23 NOTE — PROGRESS NOTES
Physical Therapy 6/23/2022    Chart reviewed, discussed with NP - Sydni Ordoñez, who clarified Dr. Cesar Vasquez still wanted pt to at least attempt edge of bed today - notified by OT - pt became hypotensive during OT eval.  Pt to receive IV bolus - will defer PT eval until 6/24.     Jelena Poe, PT

## 2022-06-23 NOTE — PERIOP NOTES
TRANSFER - OUT REPORT:    Verbal report given to Royal Dixon on Kierra Santo  being transferred to room 21  for routine post - op       Report consisted of patients Situation, Background, Assessment and   Recommendations(SBAR). Time Pre op antibiotic given:2 gram ancef  Anesthesia Stop time: 2029  Jones Present on Transfer to floor:no  Order for Jones on Chart:  Discharge Prescriptions with Chart:    Information from the following report(s) SBAR, OR Summary, Intake/Output and MAR was reviewed with the receiving nurse. Opportunity for questions and clarification was provided. Is the patient on 02? YES       L/Min 2       Other     Is the patient on a monitor? NO    Is the nurse transporting with the patient? YES    Surgical Waiting Area notified of patient's transfer from PACU?  YES      The following personal items collected during your admission accompanied patient upon transfer:   Dental Appliance:    Vision:    Hearing Aid:    Jewelry:    Clothing:    Other Valuables:    Valuables sent to safe:

## 2022-06-23 NOTE — ACP (ADVANCE CARE PLANNING)
Advance Care Planning   Advance Care Planning Inpatient Note  [address] Department    Today's Date: 6/23/2022  Unit: [facility] 5S ORTHO JOINT    Received request from IDT member. Upon review of chart and communication with care team, Spiritual Care will defer Advance Care planning with patient at this time. Patient was/were present in the room during visit. Goals of ACP Conversation:  Deferred until later time. Health Care Decision Makers:    No healthcare decision makers have been documented. Click here to complete [address] including selection of the Healthcare Decision Maker Relationship (ie \"Primary\")    Summary:  No Decision Maker named by patient at this time    Advance Care Planning Documents (Patient Wishes) on file:  None     Assessment:    In Basket request for advance directive for 554. Patient appeared to be sleeping.  left booklet, documentation, and visit care. Provided ministry of presence and silent prayer. Chart reviewed. Please contact Spiritual Care for any further referrals. Interventions:  Deferred conversation as patient was sleeping during visit by chaplai    Care Preferences Communicated:  No    Outcomes/Plan:  Conversation deferred. Rev.  Altaf Stevens, [facility] on 6/23/2022 at 4:15 PM

## 2022-06-23 NOTE — PROGRESS NOTES
Ortho NP Note    POD# 1  s/p RIGHT TOTAL KNEE ARTHROPLASTY REVISION   Pt seen with Dr Teresa Sarkar    Pt resting in bed. Patient reports that she has experienced persistent pain to right knee since surgery. Currently rating 6/10. Has been using primarily IV dilaudid overnight. Denies N/V. No CP, no SOB. Discussion of goals for discharge with Dr. Teresa Sarkar and patient. Patient expresses desire to possibly return home. However, given extent of surgery and need for immobilizer, discussed possibility that patient may require placement. Per MD, therapy evaluation beginning today pending patient tolerance with day to day evaluation of progress for dispo decision. VSS Afebrile. Visit Vitals  BP (!) 178/55   Pulse 60   Temp 96.9 °F (36.1 °C)   Resp 16   Ht 5' 6\" (1.676 m)   Wt 88.8 kg (195 lb 12.3 oz)   SpO2 96%   BMI 31.60 kg/m²       Voiding status: spontaneous void   Output (mL)  Urine Voided: 550 ml (06/22/22 2306)  Stool:  (Not Observed) (06/22/22 2306)  Last Bowel Movement Date: 06/22/22 (06/22/22 2306)  Estimated Blood Loss: 50 ml (06/22/22 2306)      Labs    Lab Results   Component Value Date/Time    HGB 11.5 06/14/2022 10:35 AM      Lab Results   Component Value Date/Time    INR 1.4 (H) 06/14/2022 10:35 AM      Lab Results   Component Value Date/Time    Sodium 140 06/14/2022 10:35 AM    Potassium 5.3 (H) 06/14/2022 10:35 AM    Chloride 110 (H) 06/14/2022 10:35 AM    CO2 23 06/14/2022 10:35 AM    Glucose 79 06/14/2022 10:35 AM    BUN 22 (H) 06/14/2022 10:35 AM    Creatinine 1.35 (H) 06/14/2022 10:35 AM    Calcium 9.8 06/14/2022 10:35 AM     Recent Glucose Results:   Lab Results   Component Value Date/Time    GLUCPOC 91 06/22/2022 09:34 PM           Body mass index is 31.6 kg/m². : A BMI > 30 is classified as obesity and > 40 is classified as morbid obesity. Immobilizer over ACE wrap to right leg, no strike through drainage. Calves soft and supple;  No pain with passive stretch  Bilateral LEs warm, dry. 1+ DP pulses. Sensation and motor intact - PF/DF/EHL intact 4/5  Foot Pumps for mechanical DVT proph while in bed     PLAN: Discussed with Dr. Alba Bermudez  1) PT: BID WBAT. Immobilizer to remain in place at least through follow up appointment with Dr. Alba Bermudez  2) Anticoagulation:  Xarelto 10 mg daily for DVT Prophylaxis. Encouraged early mobilization, bed exercises, and SCD use. 3) GI Prophylaxis - Protonix  4) Pain - Multimodal approach including cryotherapy, scheduled Tylenol with  PRN oxycodone & IV dilaudid. Goal to transition to PO only today. 5) Hx of Pre-DM:  SS lispro ordered in hospital; resume metformin at discharge  6) Hx of HTN: Current BP: 178/55. Resume home metoprolol XL, spironolactone. 7) Readniess for discharge:      [] Vital Signs stable    [x] Hgb stable : 11.5   [x] + Voiding    [x] Wound intact, drainage minimal    [x] Tolerating PO intake     [] Cleared by PT (OT if applicable)     [] Stair training completed (if applicable)    [] Independent / Contact Guard Assist (household distance)     [] Bed mobility     [] Car transfers     [] ADLs    [] Adequate pain control on oral medication alone     Therapy to evaluate today--may require placement d/t immobilizer need in post op period limiting mobility      Purvi Avery NP  Available via [employer]    Addendum: 1030: Son arrived to bedside with questions regarding surgery as he was not present for AM rounds. Contacted MINGO Eldridge who clarified extent of surgery. I attempted to discuss with patient and family but son requesting information from surgical team and operative note. Discussed goals for day and initiated discharge discussion with family. They express a desire to [facility] here until I can go home\" but acknowledge limited support at home. Son however did assure that efforts would be made for supervision.         Addendum: 0875: Patient up to [facility] commode with OT with noted orthostatic hypotension and transient unresponsiveness. Patient had recently received 1 mg IV dilaudid prior to session. Patient returned to bed and placed in Trendelenburg. 500 mL fluid bolus ordered, IV fluids continued overnight. Reduced IV dilaudid to 0.5 mg only, to  this evening.

## 2022-06-24 LAB
ANION GAP SERPL CALC-SCNC: 3 MMOL/L (ref 5–15)
BUN SERPL-MCNC: 24 MG/DL (ref 6–20)
BUN/CREAT SERPL: 20 (ref 12–20)
CALCIUM SERPL-MCNC: 7.7 MG/DL (ref 8.5–10.1)
CHLORIDE SERPL-SCNC: 114 MMOL/L (ref 97–108)
CO2 SERPL-SCNC: 22 MMOL/L (ref 21–32)
CREAT SERPL-MCNC: 1.23 MG/DL (ref 0.55–1.02)
GLUCOSE BLD STRIP.AUTO-MCNC: 105 MG/DL (ref 65–117)
GLUCOSE BLD STRIP.AUTO-MCNC: 116 MG/DL (ref 65–117)
GLUCOSE BLD STRIP.AUTO-MCNC: 122 MG/DL (ref 65–117)
GLUCOSE BLD STRIP.AUTO-MCNC: 144 MG/DL (ref 65–117)
GLUCOSE SERPL-MCNC: 130 MG/DL (ref 65–100)
HGB BLD-MCNC: 7.4 G/DL (ref 11.5–16)
POTASSIUM SERPL-SCNC: 4.4 MMOL/L (ref 3.5–5.1)
SERVICE CMNT-IMP: ABNORMAL
SERVICE CMNT-IMP: ABNORMAL
SERVICE CMNT-IMP: NORMAL
SERVICE CMNT-IMP: NORMAL
SODIUM SERPL-SCNC: 139 MMOL/L (ref 136–145)

## 2022-06-24 PROCEDURE — 97530 THERAPEUTIC ACTIVITIES: CPT

## 2022-06-24 PROCEDURE — 74011000250 HC RX REV CODE- 250: Performed by: PHYSICIAN ASSISTANT

## 2022-06-24 PROCEDURE — 97116 GAIT TRAINING THERAPY: CPT

## 2022-06-24 PROCEDURE — 65270000029 HC RM PRIVATE

## 2022-06-24 PROCEDURE — 97161 PT EVAL LOW COMPLEX 20 MIN: CPT

## 2022-06-24 PROCEDURE — 85018 HEMOGLOBIN: CPT

## 2022-06-24 PROCEDURE — 80048 BASIC METABOLIC PNL TOTAL CA: CPT

## 2022-06-24 PROCEDURE — 77010033678 HC OXYGEN DAILY

## 2022-06-24 PROCEDURE — 74011250637 HC RX REV CODE- 250/637: Performed by: ORTHOPAEDIC SURGERY

## 2022-06-24 PROCEDURE — 74011250636 HC RX REV CODE- 250/636

## 2022-06-24 PROCEDURE — 74011250637 HC RX REV CODE- 250/637: Performed by: PHYSICIAN ASSISTANT

## 2022-06-24 PROCEDURE — 94760 N-INVAS EAR/PLS OXIMETRY 1: CPT

## 2022-06-24 PROCEDURE — 36415 COLL VENOUS BLD VENIPUNCTURE: CPT

## 2022-06-24 PROCEDURE — 82962 GLUCOSE BLOOD TEST: CPT

## 2022-06-24 PROCEDURE — 97535 SELF CARE MNGMENT TRAINING: CPT

## 2022-06-24 PROCEDURE — 74011250637 HC RX REV CODE- 250/637

## 2022-06-24 RX ORDER — AMOXICILLIN 250 MG
1 CAPSULE ORAL 2 TIMES DAILY
Qty: 60 TABLET | Refills: 0 | Status: SHIPPED | OUTPATIENT
Start: 2022-06-24 | End: 2022-06-27

## 2022-06-24 RX ORDER — POLYETHYLENE GLYCOL 3350 17 G/17G
17 POWDER, FOR SOLUTION ORAL DAILY
Qty: 14 PACKET | Refills: 0 | Status: SHIPPED | OUTPATIENT
Start: 2022-06-24 | End: 2022-06-27

## 2022-06-24 RX ORDER — HYDROMORPHONE HYDROCHLORIDE 2 MG/1
2 TABLET ORAL
Status: DISCONTINUED | OUTPATIENT
Start: 2022-06-24 | End: 2022-06-27 | Stop reason: HOSPADM

## 2022-06-24 RX ORDER — HYDROMORPHONE HYDROCHLORIDE 2 MG/1
1 TABLET ORAL
Status: DISCONTINUED | OUTPATIENT
Start: 2022-06-24 | End: 2022-06-26

## 2022-06-24 RX ORDER — HYDROMORPHONE HYDROCHLORIDE 2 MG/1
1-2 TABLET ORAL
Qty: 40 TABLET | Refills: 0 | Status: SHIPPED | OUTPATIENT
Start: 2022-06-24 | End: 2022-06-27

## 2022-06-24 RX ORDER — ACETAMINOPHEN 325 MG/1
650 TABLET ORAL EVERY 6 HOURS
Qty: 100 TABLET | Refills: 0 | Status: SHIPPED | OUTPATIENT
Start: 2022-06-24 | End: 2022-06-27

## 2022-06-24 RX ADMIN — POTASSIUM CHLORIDE 10 MEQ: 750 TABLET, FILM COATED, EXTENDED RELEASE ORAL at 09:55

## 2022-06-24 RX ADMIN — OXYCODONE 10 MG: 5 TABLET ORAL at 07:55

## 2022-06-24 RX ADMIN — SPIRONOLACTONE 25 MG: 25 TABLET ORAL at 08:00

## 2022-06-24 RX ADMIN — PANTOPRAZOLE SODIUM 40 MG: 40 TABLET, DELAYED RELEASE ORAL at 07:55

## 2022-06-24 RX ADMIN — ATORVASTATIN CALCIUM 20 MG: 20 TABLET, FILM COATED ORAL at 09:55

## 2022-06-24 RX ADMIN — HYDROMORPHONE HYDROCHLORIDE 2 MG: 2 TABLET ORAL at 10:02

## 2022-06-24 RX ADMIN — OXYCODONE 10 MG: 5 TABLET ORAL at 01:28

## 2022-06-24 RX ADMIN — ACETAMINOPHEN 1000 MG: 500 TABLET ORAL at 11:04

## 2022-06-24 RX ADMIN — POLYETHYLENE GLYCOL 3350 17 G: 17 POWDER, FOR SOLUTION ORAL at 09:55

## 2022-06-24 RX ADMIN — RIVAROXABAN 10 MG: 10 TABLET, FILM COATED ORAL at 12:41

## 2022-06-24 RX ADMIN — SODIUM CHLORIDE, PRESERVATIVE FREE 10 ML: 5 INJECTION INTRAVENOUS at 14:00

## 2022-06-24 RX ADMIN — POTASSIUM CHLORIDE 10 MEQ: 750 TABLET, FILM COATED, EXTENDED RELEASE ORAL at 17:31

## 2022-06-24 RX ADMIN — LEVETIRACETAM 500 MG: 500 TABLET, FILM COATED ORAL at 22:30

## 2022-06-24 RX ADMIN — SODIUM CHLORIDE, PRESERVATIVE FREE 10 ML: 5 INJECTION INTRAVENOUS at 22:32

## 2022-06-24 RX ADMIN — ACETAMINOPHEN 1000 MG: 500 TABLET ORAL at 02:22

## 2022-06-24 RX ADMIN — SENNOSIDES AND DOCUSATE SODIUM 1 TABLET: 50; 8.6 TABLET ORAL at 09:55

## 2022-06-24 RX ADMIN — SODIUM CHLORIDE 250 ML: 9 INJECTION, SOLUTION INTRAVENOUS at 13:55

## 2022-06-24 RX ADMIN — ACETAMINOPHEN 1000 MG: 500 TABLET ORAL at 16:25

## 2022-06-24 RX ADMIN — LEVETIRACETAM 500 MG: 500 TABLET, FILM COATED ORAL at 09:54

## 2022-06-24 RX ADMIN — ACETAMINOPHEN 1000 MG: 500 TABLET ORAL at 22:30

## 2022-06-24 RX ADMIN — SENNOSIDES AND DOCUSATE SODIUM 1 TABLET: 50; 8.6 TABLET ORAL at 17:31

## 2022-06-24 RX ADMIN — SODIUM CHLORIDE 125 ML/HR: 9 INJECTION, SOLUTION INTRAVENOUS at 02:24

## 2022-06-24 RX ADMIN — METOPROLOL SUCCINATE 100 MG: 25 TABLET, EXTENDED RELEASE ORAL at 07:54

## 2022-06-24 NOTE — PROGRESS NOTES
Problem: Mobility Impaired (Adult and Pediatric)  Goal: *Acute Goals and Plan of Care (Insert Text)  Description: FUNCTIONAL STATUS PRIOR TO ADMISSION: Patient was independent and active without use of DME.    HOME SUPPORT PRIOR TO ADMISSION: The patient lived alone with daughter to provide assistance. Physical Therapy Goals  Initiated 6/24/2022  1. Patient will move from supine to sit and sit to supine  and scoot up and down in bed with modified independence within 7 day(s). 2.  Patient will transfer from bed to chair and chair to bed with modified independence using the least restrictive device within 7 day(s). 3.  Patient will perform sit to stand with modified independence within 7 day(s). 4.  Patient will ambulate with modified independence for > 150 feet with the least restrictive device within 7 day(s). 5.  Patient will ascend/descend 4 stairs with one handrail(s) with minimal assistance/contact guard assist within 7 day(s).   6/24/2022 1821 by Josee Cortes PT  Outcome: Progressing Towards Goal   PHYSICAL THERAPY TREATMENT  Patient: Marcia Faust (53 y.o. female)  Date: 6/24/2022  Diagnosis: Prosthetic knee implant failure, subsequent encounter [T84.018D, Z96.659]  Failed total right knee replacement ([facility] [address] Salome Grace <principal problem not specified>  Procedure(s) (LRB):  RIGHT TOTAL KNEE ARTHROPLASTY REVISION (Right) 2 Days Post-Op  Precautions: Fall  Chart, physical therapy assessment, plan of care and goals were reviewed. ASSESSMENT  Patient continues with skilled PT services and is progressing towards goals. Pt tolerated up in chair x 1.5 hours. Patient requiring greater assistance to stand from lower surfaces - recliner as anticipated - given remains in knee immobilizer right leg. Pt able to transfer to [facility] then amb few feet to bed - Bp monitored throughout - stable 120's/60's.   Hgb 7.4 this am - will recheck 6/25 - possible bld transfusion - do feel as pt becomes more medically stable pt more appropriate for inpatient rehab vs. SNF or pending progress may discharge home with home health and family support. Current Level of Function Impacting Discharge (mobility/balance): Mod assist x 2 for sit to stand; Amb 4 feet with RW and CGA    Other factors to consider for discharge: PLOF indep         PLAN :  Patient continues to benefit from skilled intervention to address the above impairments. Continue treatment per established plan of care. to address goals. Recommendation for discharge: (in order for the patient to meet his/her long term goals)  Therapy 3 hours per day 5-7 days per week vs. Home with Home PT    This discharge recommendation:  Has been made in collaboration with the attending provider and/or case management    IF patient discharges home will need the following DME: patient owns DME required for discharge       SUBJECTIVE:   Patient stated it felt good to sit up.     OBJECTIVE DATA SUMMARY:   Critical Behavior:  Neurologic State: Alert (very sleepy but arousable )  Orientation Level: Oriented X4  Cognition: Appropriate decision making  Safety/Judgement: Awareness of environment  Functional Mobility Training:  Bed Mobility:     Supine to Sit: Other (comment) (pt up in recliner chair legs elevated)  Sit to Supine: Moderate assistance;Assist x2  Scooting: Minimum assistance (to support right leg knee immobilizer)        Transfers:  Sit to Stand: Moderate assistance;Assist x2; Additional time; Adaptive equipment (from low recliner surfacve)  Stand to Sit: Minimum assistance;Assist x1 (2nd person to support right knee immobilizer)        Bed to Chair: Minimum assistance;Assist x2; Additional time; Adaptive equipment                    Balance:  Sitting: Impaired; With support (right knee immobilizer)  Sitting - Static: Good (unsupported)  Sitting - Dynamic: Not tested  Standing: Impaired; With support  Standing - Static: Good;Constant support  Standing - Dynamic : Fair;Constant support  Ambulation/Gait Training:  Distance (ft): 4 Feet (ft)  Assistive Device: Walker, rolling;Gait belt  Ambulation - Level of Assistance: Contact guard assistance        Gait Abnormalities: Antalgic;Decreased step clearance; Step to gait  Right Side Weight Bearing: As tolerated  Left Side Weight Bearing: Full  Base of Support: Center of gravity altered  Stance: Right decreased  Speed/Henny: Slow  Step Length: Right shortened;Left shortened  Swing Pattern: Right asymmetrical        Pain Rating:  Right knee 6/10 ; pt left in bed with ice packs applied under knee immobilizer    Activity Tolerance:   Fair - tolerated up in chair 1.5 hrs; Bp stable this afternoon    After treatment patient left in no apparent distress:   Supine in bed, Call bell within reach, and Side rails x 3    COMMUNICATION/COLLABORATION:   The patients plan of care was discussed with: Registered nurse.      Nano Arce, PT   Time Calculation: 30 mins

## 2022-06-24 NOTE — PROGRESS NOTES
Problem: Mobility Impaired (Adult and Pediatric)  Goal: *Acute Goals and Plan of Care (Insert Text)  Description: FUNCTIONAL STATUS PRIOR TO ADMISSION: Patient was independent and active without use of DME.    HOME SUPPORT PRIOR TO ADMISSION: The patient lived alone with daughter to provide assistance. Physical Therapy Goals  Initiated 6/24/2022  1. Patient will move from supine to sit and sit to supine  and scoot up and down in bed with modified independence within 7 day(s). 2.  Patient will transfer from bed to chair and chair to bed with modified independence using the least restrictive device within 7 day(s). 3.  Patient will perform sit to stand with modified independence within 7 day(s). 4.  Patient will ambulate with modified independence for > 150 feet with the least restrictive device within 7 day(s). 5.  Patient will ascend/descend 4 stairs with one handrail(s) with minimal assistance/contact guard assist within 7 day(s). PHYSICAL THERAPY EVALUATION  Patient: Yuko Ken (61 y.o. female)  Date: 6/24/2022  Primary Diagnosis: Prosthetic knee implant failure, subsequent encounter [T84.018D, Z96.659]  Failed total right knee replacement ([facility] [address] [T84.012A]  Procedure(s) (LRB):  RIGHT TOTAL KNEE ARTHROPLASTY REVISION (Right) 2 Days Post-Op   Precautions:   Fall    ASSESSMENT  Based on the objective data described below, the patient presents POD # 2 right TKA revision with pain right knee, knee immobilizer to be worn x 6 wks, decreased AROM/strength and function right leg, decreased activity tolerance - orthostatic hypotension, decline in functional mobility and impaired standing balance/gait with RW. Pt denied dizziness in sitting and initial standing - during transfer patient having difficulty following verbal cues - upon sitting pt found to be orthostatic. Bp improved after lying flat in recliner - then stable Bp sitting up with legs elevated in recliner.   Recommend continued OOB - monitoring VS and elevate HOB or use chair position. Recommend rehab vs home with Home PT pending progress with PT. If patient discharges home pt would require additional support from family - someone would need to stay with patient. Position Pulse Resp BP SpO2   06/24/22 1347 --sitting in the chair with head up 81 -- (!) 133/54 --   06/24/22 1329 --sitting in chair with Head reclined and feet up 78 -- 116/62 97 %   06/24/22 1328 --sitting in chair post transfer 82 -- (!) 84/51 92 %   06/24/22 1321 --sitting EOB 90 -- 129/78 94 %   06/24/22 1258 --supine 86 -- (!) 152/76 92 %           Current Level of Function Impacting Discharge (mobility/balance): Mod assist x 2 for supine to sit; Min assist x 2 sit to stand and amb few steps to bedside chair    Functional Outcome Measure: The patient scored 55/100 on the Barthel Index outcome measure . Other factors to consider for discharge: PLOF, supportive family     Patient will benefit from skilled therapy intervention to address the above noted impairments. PLAN :  Recommendations and Planned Interventions: bed mobility training, transfer training, gait training, therapeutic exercises, patient and family training/education, and therapeutic activities      Frequency/Duration: Patient will be followed by physical therapy:  twice daily to address goals. Recommendation for discharge: (in order for the patient to meet his/her long term goals)  Therapy 3 hours per day 5-7 days per week vs Home with home health and increased family support    This discharge recommendation:  Has been made in collaboration with the attending provider and/or case management    IF patient discharges home will need the following DME: patient owns DME required for discharge         SUBJECTIVE:   Patient stated I don't feel dizzy.     OBJECTIVE DATA SUMMARY:   HISTORY:    Past Medical History:   Diagnosis Date    A-fib ([facility] [address]     Arthritis     Diabetes ([facility] [address]     GERD (gastroesophageal reflux disease)     Hypercholesterolemia     Hypertension     Ill-defined condition     cellulitis    Multiple myeloma ([facility] [address]     WITH MGUS    Prediabetes     Seizures ([facility] [address] 2016    PER NEUROLOGIST ABSENCE SEIZURES, patient states    Sleep apnea     TESTED-DOESN'T KNOW RESULTS    Stroke ([facility] [address] 2006    TIA    Syncope and collapse     loop recorder placed 4/2016 (syncope of unknown etiology)    Thromboembolus ([facility] [address]     ANKLE     Past Surgical History:   Procedure Laterality Date    HX APPENDECTOMY      REMOVED DURING HYSTERECTOMY    HX COLONOSCOPY      HX DILATION AND CURETTAGE      HX HYSTERECTOMY  1996    HX IMPLANTABLE LOOP RECORDER  04/29/2016    HX KNEE ARTHROSCOPY  12/17/2009/ AND 2008    RIGHT KNEE    HX KNEE REPLACEMENT Right 2013 2016    HX ORTHOPAEDIC  9/09 AND  5/24/2011    RIGHT TOTAL KNEE, L-knee replacement    HX ORTHOPAEDIC  1/31/2011   10/24/2011    REMOVED KNEE REPLACEMENT    HX ORTHOPAEDIC Left 09/05/2017    HAND    HX ORTHOPAEDIC      RT TKR REVISIONS X4, LEFT TKR  REVISION X1    HX ROTATOR CUFF REPAIR Right 2008    HX SHOULDER ARTHROSCOPY  2003    RIGHT    HX WRIST FRACTURE TX Left     IR REPAIR CVC W/O PORT OR PUMP  @2020       Personal factors and/or comorbidities impacting plan of care: as above    Home Situation  Home Environment: Trailer/mobile home  # Steps to Enter: 6  Rails to Enter: Yes  One/Two Story Residence: One story  Living Alone: Yes  Support Systems: Child(rula)  Patient Expects to be Discharged to[de-identified] Home with home health  Current DME Used/Available at Home: Shower chair,Walker  Tub or Shower Type: Tub/Shower combination    EXAMINATION/PRESENTATION/DECISION MAKING:   Critical Behavior:  Neurologic State: Alert (very sleepy but arousable )  Orientation Level: Oriented X4  Cognition: Appropriate decision making  Safety/Judgement: Awareness of environment  Hearing:   Auditory  Auditory Impairment: None  Skin:  right leg ace wrap removed, Aquacel dressing in place - well adhered and minimal drainage noted; knee immobilizer at all times    Range Of Motion:  AROM: Generally decreased, functional                       Strength:    Strength: Generally decreased, functional                    Tone & Sensation:   Tone: Normal              Sensation: Intact               Coordination:  Coordination: Within functional limits  Functional Mobility:  Bed Mobility:     Supine to Sit: Moderate assistance;Assist x2; Additional time  Sit to Supine:  (remained in the chair)  Scooting: Minimum assistance (pt scooting edge of bed)  Transfers:  Sit to Stand: Minimum assistance;Assist x2; Additional time; Adaptive equipment  Stand to Sit: Minimum assistance; Additional time; Adaptive equipment;Assist x2        Bed to Chair: Minimum assistance;Assist x2; Additional time; Adaptive equipment              Balance:   Sitting: Impaired; With support (right leg propped secondary to knee immobilizer)  Sitting - Static: Good (unsupported)  Sitting - Dynamic: Not tested  Standing: Impaired; With support  Standing - Static: Good;Constant support  Standing - Dynamic : Fair;Constant support  Ambulation/Gait Training:  Distance (ft): 3 Feet (ft)  Assistive Device: Walker, rolling;Gait belt  Ambulation - Level of Assistance: Contact guard assistance;Assist x2; Additional time; Adaptive equipment        Gait Abnormalities: Antalgic;Decreased step clearance; Step to gait  Right Side Weight Bearing: As tolerated  Left Side Weight Bearing: Full  Base of Support: Center of gravity altered;Shift to left  Stance: Right decreased  Speed/Henny: Slow  Step Length: Right shortened;Left shortened  Swing Pattern: Right asymmetrical     Therapeutic Exercises:   Pt instructed to perform ankle pumps and incentive spirometer x 10 once hr when awake.       Functional Measure:  Barthel Index:    Bathin  Bladder: 10  Bowels: 10  Groomin  Dressin  Feeding: 10  Mobility: 0  Stairs: 0  Toilet Use: 5  Transfer (Bed to Chair and Back): 10  Total: 55/100       The Barthel ADL Index: Guidelines  1. The index should be used as a record of what a patient does, not as a record of what a patient could do. 2. The main aim is to establish degree of independence from any help, physical or verbal, however minor and for whatever reason. 3. The need for supervision renders the patient not independent. 4. A patient's performance should be established using the best available evidence. Asking the patient, friends/relatives and nurses are the usual sources, but direct observation and common sense are also important. However direct testing is not needed. 5. Usually the patient's performance over the preceding 24-48 hours is important, but occasionally longer periods will be relevant. 6. Middle categories imply that the patient supplies over 50 per cent of the effort. 7. Use of aids to be independent is allowed. Score Interpretation (from [address] [facility])    Independent   60-79 Minimally independent   40-59 Partially dependent   20-39 Very dependent   <20 Totally dependent     -Magalys Lubin., Barthel, D.W. (1965). Functional evaluation: the Barthel Index. [address] [facility] ([address] [facility] [address] (1997). The Barthel activities of daily living index: self-reporting versus actual performance in the old (> or = 75 years). Journal [address] 45(7), [facility], J.J.M.F, Ramiro Acosta., Cristal Inch. (1999). Measuring the change in disability after inpatient rehabilitation; comparison of the responsiveness of the Barthel Index and Functional [place] Measure. Journal of Neurology, Neurosurgery, and Psychiatry, 66(4), 750-599. Graham Lozano, N.HEIDY.LOUANN, SHELDON Padilla, & Bertha Black M.A. (2004) Assessment of post-stroke quality of life in cost-effectiveness studies: The usefulness of the Barthel Index and the EuroQoL-5D.  Quality of Life Research, 15, 169-77           Physical Therapy Evaluation Charge Determination   History Examination Presentation Decision-Making   LOW Complexity : Zero comorbidities / personal factors that will impact the outcome / POC LOW Complexity : 1-2 Standardized tests and measures addressing body structure, function, activity limitation and / or participation in recreation  LOW Complexity : Stable, uncomplicated  LOW Complexity : FOTO score of       Based on the above components, the patient evaluation is determined to be of the following complexity level: LOW     Pain Rating:  Right knee 6/10    Activity Tolerance:   Fair - limited by pain and low Bp    After treatment patient left in no apparent distress:   Call bell within reach, Caregiver / family present, and recliner with legs elevated     COMMUNICATION/EDUCATION:   The patients plan of care was discussed with: Occupational therapist, Registered nurse, and NP - Richy Duggan . Fall prevention education was provided and the patient/caregiver indicated understanding., Patient/family have participated as able in goal setting and plan of care. , and Patient/family agree to work toward stated goals and plan of care.     Thank you for this referral.  Yee Steinberg, PT   Time Calculation: 65 mins

## 2022-06-24 NOTE — PROGRESS NOTES
RUR 11 %     Transitions of Care Note- Home with Home Health PT and Nursing. Referral sent to [facility] via [facility] and accepted. CM will monitor progress for the possible need for SNF placement. The attending and patient prefer home. Therapy recommending SNF. Family stated that they will transport home when medically stable. Follow up with Ortho. PT and OT will continue to follow     Decision Maker/son- Sandra [facility]- [phone]. Transition of Care Plan:     The Plan for Transition of Care is related to the following treatment goals: Home Health     The Patient and/or patient representative  was provided with a choice of provider and agrees  with the discharge plan. Yes [x] No []    A Freedom of choice list was provided with basic dialogue that supports the patient's individualized plan of care/goals and shares the quality data associated with the providers. Yes [x] No []    Reason for Admission:  Knee revision surgery                      RUR Score:   11 %                 Plan for utilizing home health:   Referral sent to [facility]       PCP: First and Last name:  Ulices Will MD     Name of Practice:    Are you a current patient: Yes/No:    Approximate date of last visit: 2 weeks ago for pre op   Can you participate in a virtual visit with your PCP:                     Current Advanced Directive/Advance Care Plan: Full Code      Healthcare Decision Maker:   Click here to complete [address] including selection of the Healthcare Decision Maker Relationship (ie \"Primary\")             Primary Decision MakerVnormaa [facility] - [phone]                  Transition of Care Plan:    Home with home health- patient lives alone and was independent prior to surgery. She has a son and other family for support. The patient has used [facility] in the past. Referral sent for home PT and Nursing. CM confirmed demographics and Insurance.      PT and OT to continue to follow. Care Management Interventions  PCP Verified by CM:  Yes  Palliative Care Criteria Met (RRAT>21 & CHF Dx)?: No  Mode of Transport at Discharge: Self  Transition of Care Consult (CM Consult): [address]: No  Reason Outside [place]: Patient already serviced by other home care/hospice agency  MyChart Signup: No  Discharge Durable Medical Equipment: No  Health Maintenance Reviewed: Yes  Physical Therapy Consult: Yes  Occupational Therapy Consult: Yes  Speech Therapy Consult: No  Support Systems: Child(rula)  Confirm Follow Up Transport: Family  The Plan for Transition of Care is Related to the Following Treatment Goals : Home with [address] [place] [address]  The Patient and/or Patient Representative was Provided with a Choice of Provider and Agrees with the Discharge Plan?: Yes  Name of the Patient Representative Who was Provided with a Choice of Provider and Agrees with the Discharge Plan: the patient and her son Jase Mane of Choice List was Provided with Basic Dialogue that Supports the Patient's Individualized Plan of Care/Goals, Treatment Preferences and Shares the Quality Data Associated with the Providers?: Yes   Resource Information Provided?: No  Discharge Location  Patient Expects to be Discharged to[de-identified] Home with home health    NILSON Lr

## 2022-06-24 NOTE — PROGRESS NOTES
Ortho NP Note    POD# 2  s/p RIGHT TOTAL KNEE ARTHROPLASTY REVISION   Pt seen in room. Pt resting in bed. Crying, stating pain is currently 8/10. Reports overnight she had occasional oxycodone but \"I don't like bothering people. \"  When she did take oxycodone, she had some relief but felt it never fully alleviated pain to right knee. VSS Afebrile. Visit Vitals  BP (!) 185/71   Pulse 86   Temp 98.5 °F (36.9 °C)   Resp 18   Ht 5' 6\" (1.676 m)   Wt 88.8 kg (195 lb 12.3 oz)   SpO2 98%   BMI 31.60 kg/m²       Voiding status: spontaneous void, PureWick  Output (mL)  Urine Voided: 500 ml (06/24/22 0627)  Stool:  (Not Observed) (06/22/22 2306)  Last Bowel Movement Date: 06/22/22 (06/23/22 2049)  Estimated Blood Loss: 50 ml (06/23/22 2045)      Labs    Lab Results   Component Value Date/Time    HGB 7.4 (L) 06/24/2022 01:21 AM      Lab Results   Component Value Date/Time    INR 1.4 (H) 06/14/2022 10:35 AM      Lab Results   Component Value Date/Time    Sodium 139 06/24/2022 01:21 AM    Potassium 4.4 06/24/2022 01:21 AM    Chloride 114 (H) 06/24/2022 01:21 AM    CO2 22 06/24/2022 01:21 AM    Glucose 130 (H) 06/24/2022 01:21 AM    BUN 24 (H) 06/24/2022 01:21 AM    Creatinine 1.23 (H) 06/24/2022 01:21 AM    Calcium 7.7 (L) 06/24/2022 01:21 AM     Recent Glucose Results:   Lab Results   Component Value Date/Time     (H) 06/24/2022 01:21 AM     (H) 06/23/2022 11:32 AM    GLUCPOC 105 06/24/2022 05:49 AM    GLUCPOC 191 (H) 06/23/2022 08:56 PM    GLUCPOC 184 (H) 06/23/2022 06:37 PM           Body mass index is 31.6 kg/m². : A BMI > 30 is classified as obesity and > 40 is classified as morbid obesity. Knee immobilizer to right knee, ACE removed with noted drainage to Aquacel dressing. Cryotherapy in place over incision  Calves soft and supple; No pain with passive stretch  Bilateral LEs warm, dry. 2+ DP pulses.     Sensation and motor intact - PF/DF/EHL intact 5/5  Foot Pumps for mechanical DVT proph while in bed     PLAN:  1) PT: BID WBAT. Immobilizer in place at least through follow up appointment. 2) Anticoagulation:  Xarelto  10  mg PO BID for DVT Prophylaxis. Encouraged early mobilization, bed exercises, and SCD use. 3) GI Prophylaxis - Protonix  4) Pain - Multimodal approach including cryotherapy, scheduled Tylenol with  PRN dilaudid PO. Changed from oxycodone this morning  5) Post operative anemia: Pre op Hgb: 11.5. EBL: 200 mL. Hgb on POD 1: 8.7, today (POD 2): 7.4. Expected Acute blood loss post-op anemia, continue to monitor. 6) Hx of Pre-DM: SS lispro ordered in hospital; resume metformin at discharge  7) Hx of HTN: Current BP: 185/71. Resume home metoprolol XL, spironolactone. PRN Hydralazine IV. 8) Readiness for discharge: ongoing therapy evaluation for recommendations for home vs. rehab placement d/t preoperative deconditioning, prolonged knee immobilizer in post op period. Patient's goal remains discharge to home. [x] Vital Signs stable    [x] + Voiding    [x] Wound intact, drainage minimal    [x] Tolerating PO intake     [] Cleared by PT (OT if applicable)     [] Stair training completed (if applicable)    [] Independent / Contact Guard Assist (household distance)     [] Bed mobility     [] Car transfers     [] ADLs    [] Adequate pain control on oral medication alone     Ongoing therapy evaluation for destination: home vs. Rehab. Dilaudid rx printed and on chart--if cleared for discharge to home, will need narcotic sent to pharmacy. Vaughn Briceño NP  Available via [employer]    Addendum: [id number]: Family and patient in room. To bedside with therapy. Son remains concerned about care progress for patient. Extensive discussion regarding operative report, need for immobilizer, and rehab process with family and patient with PT at bedside. Still pending repeat physical therpy evaluation today.   Attempted to discuss need for possible rehab placement though patient continues to express desire for discharge to home. Appreciate therapy recs for disposition pending progress. Patient with orthostatic hypotension noted again, 250 mL fluid bolus ordered. Will recheck AM Hgb, blood consent obtained and on chart. When discussing blood consent with patient, family states she was seen at [facility] last week and Xarelto was changed to Eliquis 5 mg BID. Mail order prescription not yet received but states \"I don't take Xarelto any more. \"  Geov[facility] Gandhi team regarding medication update and okayed to change to 2.5 mg Eliquis BID in post op setting.   Pharmacy contacted to update pre op med list.

## 2022-06-24 NOTE — PROGRESS NOTES
Agree with LPN Susy's assessment and charting and stayed in communication with her throughout the shift

## 2022-06-24 NOTE — PROGRESS NOTES
Problem: Self Care Deficits Care Plan (Adult)  Goal: *Acute Goals and Plan of Care (Insert Text)  Description: FUNCTIONAL STATUS PRIOR TO ADMISSION: Patient was independent and active without use of DME.     HOME SUPPORT: The patient lives alone with daughter in the area. Occupational Therapy Goals  Initiated 6/23/2022  1. Patient will perform grooming with modified independence within 7 day(s). 2.  Patient will perform bathing with modified independence within 7 day(s). 3.  Patient will perform upper body dressing and lower body dressing with modified independence within 7 day(s). 4.  Patient will perform toilet transfers with modified independence within 7 day(s). 5.  Patient will perform all aspects of toileting with modified independence within 7 day(s). 6.  Patient will utilize energy conservation techniques during functional activities with verbal cues within 7 day(s). Outcome: Progressing Towards Goal     OCCUPATIONAL THERAPY TREATMENT  Patient: Ta Hawkins (83 y.o. female)  Date: 6/24/2022  Diagnosis: Prosthetic knee implant failure, subsequent encounter [T84.018D, Z96.659]  Failed total right knee replacement ([facility] [address] Naaman Fillers <principal problem not specified>  Procedure(s) (LRB):  RIGHT TOTAL KNEE ARTHROPLASTY REVISION (Right) 2 Days Post-Op  Precautions: Fall  Chart, occupational therapy assessment, plan of care, and goals were reviewed. ASSESSMENT  Patient continues with skilled OT services and is progressing towards goals. She was willing to progress OOB to the chair with cues and education for transfer techniques and support. She needs continued step by step directions for all functional transfers but in working with PT and OT, she did well. Monitoring orthostatics, she does have a drop following transfer from EOB to the chair, reclined in the chair and allowed to recover which she did well.   Due to hypotension again this date, recommend continued use of the purewick v. Bed pan for now until BP can remain stable when  progressing OOB with feet in a dependent position. At this time, she is well below her baseline which is independent and anticipate she will require discharge to rehab setting to maximize independence and safety prior to discharge home. Pt will tolerate intense rehab once BP stabilizes. Per NP, pt to have HgB recheck tomorrow as today it was 7.4. Current Level of Function Impacting Discharge (ADLs): max A overall with min A x 2 for transfers    Other factors to consider for discharge: debility, orthostatic hypotension         PLAN :  Patient continues to benefit from skilled intervention to address the above impairments. Continue treatment per established plan of care to address goals. Recommend with staff: Med Christian to the chair as tolerated while monitoring orthostatics    Recommend next OT session: Continue with established plan of care    Recommendation for discharge: (in order for the patient to meet his/her long term goals)  Therapy 3 hours per day 5-7 days per week    This discharge recommendation:  Has been made in collaboration with the attending provider and/or case management    IF patient discharges home will need the following DME: none       SUBJECTIVE:   Patient stated Yes.     OBJECTIVE DATA SUMMARY:   Cognitive/Behavioral Status:  Neurologic State: Alert (very sleepy but arousable )  Orientation Level: Oriented X4  Cognition: Appropriate decision making             Functional Mobility and Transfers for ADLs:  Bed Mobility:  Supine to Sit: Additional time; Moderate assistance;Assist x2 (step by step cues for technique)  Sit to Supine:  (remained in the chair)    Transfers:  Sit to Stand: Minimum assistance;Assist x2     Bed to Chair: Minimum assistance;Assist x2 (difficulty following directions to position infront of chair)    Balance:       ADL Intervention:     Assisted from bed to chair with additional time and cues for tasks.   She progressed to EOB well with assist x 2 persons. She stood with min A x 2 persons and noted with difficulty following directions to turn to the chair correctly. Pt then sitting in the chair and noted with drop In BP. Pt repositioned with feet elevated and head reclined. Pt recovered well. Readjusted in the chair and feeling better. Encouraged po drink and food but she was reluctant to tasks. Lower Body Dressing Assistance  Dressing Assistance: Total assistance(dependent)              Pain:  No pain when asked    Activity Tolerance:   Fair and signs and symptoms of orthostatic hypotension  Patient Vitals for the past 4 hrs:   Temp Pulse Resp BP SpO2   06/24/22 1347 --sitting in the chair with head up 81 -- (!) 133/54 --   06/24/22 1329 --sitting in chair with Head reclined and feet up 78 -- 116/62 97 %   06/24/22 1328 --sitting in chair post transfer 82 -- (!) 84/51 92 %   06/24/22 1321 --sitting EOB 90 -- 129/78 94 %   06/24/22 1258 --supine 86 -- (!) 152/76 92 %         After treatment patient left in no apparent distress:   Sitting in chair and Call bell within reach    COMMUNICATION/COLLABORATION:   The patients plan of care was discussed with: Physical therapist, Registered nurse, and Discussed with NP .      Laila Martinez OT  Time Calculation: 38 mins

## 2022-06-24 NOTE — OP NOTES
[address] [facility]  OPERATIVE REPORT    Name:  July Gamino  MR#:  [id number]  :  1950  ACCOUNT #:  [de-identified]  DATE OF SERVICE:  2022    PREOPERATIVE DIAGNOSIS:  Failed right total knee. POSTOPERATIVE DIAGNOSIS:  Failed right total knee. PROCEDURE PERFORMED:  Revision of both components, right total knee replacement. SURGEON:  Dago Avalos MD    ASSISTANT:  Elvira Beltran PA-C    ANESTHESIA:  Spinal.    COMPLICATIONS:  Partial avulsion of the tibial tubercle. SPECIMENS REMOVED:  None. IMPLANTS:  Implants Removed:  Tibial component and entire femoral component. Implants Reimplanted:  Size 2.5 TC3 femoral component with 14-mm stem extension and porous sleeve in the femur with +8 distal lateral augment, +4 distal medial augment, +8 posterior medial and lateral augment. Tibial implant, 17.5-mm TC3 tibial component. ESTIMATED BLOOD LOSS:  200 mL. DRAINS:  None. PREOPERATIVE ANTIBIOTIC:  Ancef 2 g.    COUNTS:  Sponge, instrument, and needle counts were correct at the end of the procedure. INDICATIONS:  A 69-year-old woman, this is #11 surgery on the patient's right knee. I initially met the patient when she presented with an infected total knee arthroplasty, this was years ago. At that point, she underwent a two-stage revision reconstruction. This was done years ago probably  to . She did well for a period of time and then due to her undersurface of the patella presented back for resurfacing of her patella. This year, she presented back. I obtained x-rays which showed loosening of her femoral component and we discussed revision. The patient has autoimmune disorder and is in a chronically debilitated state. She is at increased risk for infection, wound issues, soft tissue issues at the time of surgery, but she states that the pain is severe and intractable and she elected to proceed. PROCEDURE:  Anesthetic was initiated.   Preoperative dose of antibiotic was given. The right side was confirmed as the operative side. She was prepped and draped in the usual sterile fashion. The knee exam prior to incision revealed flexion to about 85 degrees. Limb was exsanguinated. Tourniquet was inflated. The knee was exposed through a medial parapatellar arthrotomy. Her patellar tendon was very, very thin. Care was made to protect the patellar tendon during the procedure. Complete synovectomy was performed. The medial and lateral gutters were reconstituted. The knee was flexed. The patella was subluxed. Polyethylene was removed. I examined the tibial component in distraction, compression, rotation, and it was not loose. The femur was exposed and it was examined in compression and distraction, it was loose and it was extracted from the femur anatomy. Easy extraction due to the press-fit stem. The femoral canal was back scratched. All debris was removed. The medial cortex was removed from the distal femur. At this point, I broached with the sleeve broaches until they were rotationally and axially stable, made a clean-up this femoral cut and placed the femoral trial.  Augments were placed visually, +8 distal lateral, +4 distal medial, and +8 posterior, size 2.5 implant was removed and a size 2.5 implant was reimplanted as a trial.  The knee was trialed, 17.5 poly was most appropriate for tension. The trial implants were removed. Bony surfaces of the femur were cleaned, lavaged, and dried. Extensive posterior debridement was performed as there was abundant posterior scar tissue. The knee joint was then copiously irrigated and prepared for the femoral component implantation. The stem, sleeve, and augments were assembled. Mixed cement impacted the femoral component and at this point, the cement was allowed to fully cure without motion. After the cement had fully cured, I trialed polys, 17.5 poly was most appropriate in trialing.   In deep flexion, the tibial tubercle began to peel. Due to the very tenuous nature of this patient's soft tissue envelope, at this point I did not attempt any deeper flexion, placed two suture anchors in the medial aspect of the tibial tubercle. After the real implants were all implanted, the knee joint was copiously irrigated. Soft tissues were infiltrated with local anesthetic. The tibial tubercle and patellar tendon were closed back down with the suture anchors. Multiple suture passers were made at this point. After final irrigation, the arthrotomy was closed with a combination of #2 and 0 Vicryl sutures. Tourniquet was released. Hemostasis was obtained in the skin and subcutaneous, which were closed with 2-0 Vicryl sutures, 3-0 nylon sutures in horizontal mattress fashion, and staples. Sterile dressing was applied. There were no complications. No specimen. I did have an aspiration preoperatively, which was negative for any bacterial growth as well as negative alpha-defensin test.    Knee immobilizer was placed. The patient was awakened from anesthetic, palpable pulse was present in her foot, she was taken to the recovery room in stable condition. John Valdez assisted for the procedure with exposure, retraction, placement of the implant, removal of cement, and wound closure. Callum's assistance was necessary as no other qualified surgical resident or assistant were available to help with vital parts of the procedure. The patient was taken to the recovery room stable.       Mara Guzman MD [email or web address]  D:  06/23/2022 17:37  T:  06/24/2022 5:08  JOB #:  [id number]

## 2022-06-25 LAB
ANION GAP SERPL CALC-SCNC: 6 MMOL/L (ref 5–15)
BUN SERPL-MCNC: 24 MG/DL (ref 6–20)
BUN/CREAT SERPL: 20 (ref 12–20)
CALCIUM SERPL-MCNC: 7.9 MG/DL (ref 8.5–10.1)
CHLORIDE SERPL-SCNC: 116 MMOL/L (ref 97–108)
CO2 SERPL-SCNC: 20 MMOL/L (ref 21–32)
CREAT SERPL-MCNC: 1.23 MG/DL (ref 0.55–1.02)
GLUCOSE BLD STRIP.AUTO-MCNC: 102 MG/DL (ref 65–117)
GLUCOSE BLD STRIP.AUTO-MCNC: 105 MG/DL (ref 65–117)
GLUCOSE BLD STRIP.AUTO-MCNC: 127 MG/DL (ref 65–117)
GLUCOSE BLD STRIP.AUTO-MCNC: 138 MG/DL (ref 65–117)
GLUCOSE SERPL-MCNC: 105 MG/DL (ref 65–100)
HGB BLD-MCNC: 6.9 G/DL (ref 11.5–16)
HISTORY CHECKED?,CKHIST: NORMAL
POTASSIUM SERPL-SCNC: 4.4 MMOL/L (ref 3.5–5.1)
SERVICE CMNT-IMP: ABNORMAL
SERVICE CMNT-IMP: ABNORMAL
SERVICE CMNT-IMP: NORMAL
SERVICE CMNT-IMP: NORMAL
SODIUM SERPL-SCNC: 142 MMOL/L (ref 136–145)

## 2022-06-25 PROCEDURE — 65270000029 HC RM PRIVATE

## 2022-06-25 PROCEDURE — 36430 TRANSFUSION BLD/BLD COMPNT: CPT

## 2022-06-25 PROCEDURE — 97530 THERAPEUTIC ACTIVITIES: CPT

## 2022-06-25 PROCEDURE — 85018 HEMOGLOBIN: CPT

## 2022-06-25 PROCEDURE — 74011250637 HC RX REV CODE- 250/637: Performed by: PHYSICIAN ASSISTANT

## 2022-06-25 PROCEDURE — 82962 GLUCOSE BLOOD TEST: CPT

## 2022-06-25 PROCEDURE — P9016 RBC LEUKOCYTES REDUCED: HCPCS

## 2022-06-25 PROCEDURE — 74011250637 HC RX REV CODE- 250/637: Performed by: ORTHOPAEDIC SURGERY

## 2022-06-25 PROCEDURE — 74011250637 HC RX REV CODE- 250/637

## 2022-06-25 PROCEDURE — 74011000250 HC RX REV CODE- 250: Performed by: PHYSICIAN ASSISTANT

## 2022-06-25 PROCEDURE — 2709999900 HC NON-CHARGEABLE SUPPLY

## 2022-06-25 PROCEDURE — 80048 BASIC METABOLIC PNL TOTAL CA: CPT

## 2022-06-25 PROCEDURE — 36591 DRAW BLOOD OFF VENOUS DEVICE: CPT

## 2022-06-25 RX ORDER — SODIUM CHLORIDE 9 MG/ML
250 INJECTION, SOLUTION INTRAVENOUS AS NEEDED
Status: DISCONTINUED | OUTPATIENT
Start: 2022-06-25 | End: 2022-06-27 | Stop reason: HOSPADM

## 2022-06-25 RX ADMIN — SENNOSIDES AND DOCUSATE SODIUM 1 TABLET: 50; 8.6 TABLET ORAL at 08:18

## 2022-06-25 RX ADMIN — METOPROLOL SUCCINATE 100 MG: 25 TABLET, EXTENDED RELEASE ORAL at 07:10

## 2022-06-25 RX ADMIN — LEVETIRACETAM 500 MG: 500 TABLET, FILM COATED ORAL at 08:18

## 2022-06-25 RX ADMIN — HYDROMORPHONE HYDROCHLORIDE 1 MG: 2 TABLET ORAL at 08:16

## 2022-06-25 RX ADMIN — POTASSIUM CHLORIDE 10 MEQ: 750 TABLET, FILM COATED, EXTENDED RELEASE ORAL at 18:22

## 2022-06-25 RX ADMIN — SPIRONOLACTONE 25 MG: 25 TABLET ORAL at 07:10

## 2022-06-25 RX ADMIN — SODIUM CHLORIDE, PRESERVATIVE FREE 10 ML: 5 INJECTION INTRAVENOUS at 21:39

## 2022-06-25 RX ADMIN — PANTOPRAZOLE SODIUM 40 MG: 40 TABLET, DELAYED RELEASE ORAL at 07:10

## 2022-06-25 RX ADMIN — ACETAMINOPHEN 1000 MG: 500 TABLET ORAL at 16:26

## 2022-06-25 RX ADMIN — ATORVASTATIN CALCIUM 20 MG: 20 TABLET, FILM COATED ORAL at 08:18

## 2022-06-25 RX ADMIN — SENNOSIDES AND DOCUSATE SODIUM 1 TABLET: 50; 8.6 TABLET ORAL at 18:22

## 2022-06-25 RX ADMIN — ACETAMINOPHEN 1000 MG: 500 TABLET ORAL at 11:21

## 2022-06-25 RX ADMIN — LEVETIRACETAM 500 MG: 500 TABLET, FILM COATED ORAL at 21:37

## 2022-06-25 RX ADMIN — POTASSIUM CHLORIDE 10 MEQ: 750 TABLET, FILM COATED, EXTENDED RELEASE ORAL at 08:17

## 2022-06-25 RX ADMIN — ACETAMINOPHEN 1000 MG: 500 TABLET ORAL at 21:37

## 2022-06-25 RX ADMIN — SODIUM CHLORIDE, PRESERVATIVE FREE 10 ML: 5 INJECTION INTRAVENOUS at 14:00

## 2022-06-25 RX ADMIN — HYDROMORPHONE HYDROCHLORIDE 2 MG: 2 TABLET ORAL at 12:22

## 2022-06-25 RX ADMIN — ACETAMINOPHEN 1000 MG: 500 TABLET ORAL at 03:51

## 2022-06-25 RX ADMIN — POLYETHYLENE GLYCOL 3350 17 G: 17 POWDER, FOR SOLUTION ORAL at 08:18

## 2022-06-25 RX ADMIN — SODIUM CHLORIDE, PRESERVATIVE FREE 10 ML: 5 INJECTION INTRAVENOUS at 07:11

## 2022-06-25 NOTE — PROGRESS NOTES
Problem: Mobility Impaired (Adult and Pediatric)  Goal: *Acute Goals and Plan of Care (Insert Text)  Description: FUNCTIONAL STATUS PRIOR TO ADMISSION: Patient was independent and active without use of DME.    HOME SUPPORT PRIOR TO ADMISSION: The patient lived alone with daughter to provide assistance. Physical Therapy Goals  Initiated 6/24/2022  1. Patient will move from supine to sit and sit to supine  and scoot up and down in bed with modified independence within 7 day(s). 2.  Patient will transfer from bed to chair and chair to bed with modified independence using the least restrictive device within 7 day(s). 3.  Patient will perform sit to stand with modified independence within 7 day(s). 4.  Patient will ambulate with modified independence for > 150 feet with the least restrictive device within 7 day(s). 5.  Patient will ascend/descend 4 stairs with one handrail(s) with minimal assistance/contact guard assist within 7 day(s). Outcome: Progressing Towards Goal       PHYSICAL THERAPY TREATMENT  Patient: Angel Score (33 y.o. female)  Date: 6/25/2022  Diagnosis: Prosthetic knee implant failure, subsequent encounter [T84.018D, Z96.659]  Failed total right knee replacement ([facility] [address] Kitty Marks <principal problem not specified>  Procedure(s) (LRB):  RIGHT TOTAL KNEE ARTHROPLASTY REVISION (Right) 3 Days Post-Op  Precautions: Fall  Chart, physical therapy assessment, plan of care and goals were reviewed. ASSESSMENT  Patient continues with skilled PT services and is progressing towards goals. Pt received supine in bed with call light on requesting to use the bathroom. Pt willing to continued with therapy. Pt continues to be limited by pain with activity and WBing, unsteady gait while wearing the knee immobilizer. Pt able to tolerate bed mobility to R side EOB with mod A, followed by STS with max A to RW. Pt continued with SPT to  [facility] with VC for sequencing and mod A for sitting.  After voiding pt was able to perform personal care with mod A for standing while using RW for support. Pt continued with additional STS from [facility] and performed short retro amb to recliner chair with mod A for sitting. Pt completed session with cold back on R knee with Pt ed for any mobility the immobilizer needs to be tightened with cold back removed prior to mobility. Pt reports 9/10 pain with activity and left with LE elevated and all needs met at the time. Rn notified of session. .     Current Level of Function Impacting Discharge (mobility/balance): mod/max A for mobiltiy, CGA for short shuffling amb with SPT and retro. Other factors to consider for discharge: fall risk, pain management         PLAN :  Patient continues to benefit from skilled intervention to address the above impairments. Continue treatment per established plan of care. to address goals. Recommendation for discharge: (in order for the patient to meet his/her long term goals)  Therapy 3 hours per day 5-7 days per week VS HHPT    This discharge recommendation:  Has not yet been discussed the attending provider and/or case management    IF patient discharges home will need the following DME: patient owns DME required for discharge       SUBJECTIVE:   Patient stated I need to use the bathroom.     OBJECTIVE DATA SUMMARY:   Critical Behavior:  Neurologic State: Alert  Orientation Level: Oriented X4  Cognition: Appropriate for age attention/concentration,Appropriate safety awareness,Follows commands  Safety/Judgement: Awareness of environment      Functional Mobility Training:  Bed Mobility:  Supine to Sit: Moderate assistance  Scooting: Moderate assistance     Transfers:  Sit to Stand: Assist x1;Maximum assistance  Stand to Sit: Assist x1; Moderate assistance  Bed to Chair: Moderate assistance (bed>BSC>chair)    Balance:  Sitting: Impaired  Sitting - Static: Good (unsupported)  Sitting - Dynamic: Not tested  Standing: Impaired; With support  Standing - Static: Constant support; Fair  Standing - Dynamic : Constant support;Fair;Poor    Ambulation/Gait Training:  Distance (ft): 5 Feet (ft) (SPT and retro amb )  Assistive Device: Brace/Splint;Gait belt;Walker, rolling  Ambulation - Level of Assistance: Contact guard assistance; Additional time  Gait Abnormalities: Antalgic;Decreased step clearance  Right Side Weight Bearing: As tolerated  Base of Support: Center of gravity altered;Shift to left  Stance: Right decreased  Speed/Henny: Pace decreased (<100 feet/min); Slow  Step Length: Left shortened;Right shortened      Therapeutic Exercises:     EXERCISE   Sets   Reps   Active Active Assist   Passive Self ROM   Comments   Ankle Pumps 1 10 [x]                                        []                                        []                                        []                                           Quad Sets   []                                        []                                        []                                        []                                           Hamstring Sets   []                                        []                                        []                                        []                                           Short Arc Quads   []                                        []                                        []                                        []                                           Knee Extension Stretch     []                                          []                                          []                                          []                                           Heel Slides   []                                        []                                        []                                        []                                           Long Arc Quads   []                                        []                                        []                                        [] Knee Flexion Stretch   []                                        []                                        []                                        []                                           Straight Leg Raises   []                                        []                                        []                                        []                                               Pain Ratin/10 post activity    Activity Tolerance:   Fair    After treatment patient left in no apparent distress:   Sitting in chair and LE elevated     COMMUNICATION/COLLABORATION:   The patients plan of care was discussed with: Registered nurse.      Donn Markham PTA   Time Calculation: 26 mins

## 2022-06-25 NOTE — PROGRESS NOTES
Orthopaedics Daily Progress Note                            Date of Surgery:  6/22/2022      Patient: Len Joyce   YOB: 1950  Age: 66 y.o. SUBJECTIVE:   3 Days Post-Op following RIGHT TOTAL KNEE ARTHROPLASTY REVISION. The patient's post operative pain is controlled. No CP/SOB. No N/V.     OBJECTIVE:     Vital Signs:      Visit Vitals  BP (!) 155/70 (BP 1 Location: Left upper arm, BP Patient Position: At rest;Lying)   Pulse 75   Temp 97.8 °F (36.6 °C)   Resp 16   Ht 5' 6\" (1.676 m)   Wt 195 lb 12.3 oz (88.8 kg)   SpO2 95%   BMI 31.60 kg/m²       Physical Exam:  General: A&Ox3. The patient is cooperative, and in no acute distress. Respiratory: Respirations are unlabored. Surgical site(s): dressing clean, dry  Musculoskeletal: Calves are soft, supple, and non-tender upon palpation. Motor 5/5. Neurological:  Neurovascularly intact with good dorsi and plantar flexion.     Pulses symmetrical.    Laboratory Values:             Recent Results (from the past 12 hour(s))   GLUCOSE, POC    Collection Time: 06/24/22 10:35 PM   Result Value Ref Range    Glucose (POC) 144 (H) 65 - 117 mg/dL    Performed by Pedro Luis Hill    HEMOGLOBIN    Collection Time: 06/25/22  3:54 AM   Result Value Ref Range    HGB 6.9 (L) 11.5 - 45.2 g/dL   METABOLIC PANEL, BASIC    Collection Time: 06/25/22  3:54 AM   Result Value Ref Range    Sodium 142 136 - 145 mmol/L    Potassium 4.4 3.5 - 5.1 mmol/L    Chloride 116 (H) 97 - 108 mmol/L    CO2 20 (L) 21 - 32 mmol/L    Anion gap 6 5 - 15 mmol/L    Glucose 105 (H) 65 - 100 mg/dL    BUN 24 (H) 6 - 20 MG/DL    Creatinine 1.23 (H) 0.55 - 1.02 MG/DL    BUN/Creatinine ratio 20 12 - 20      GFR est AA 52 (L) >60 ml/min/1.73m2    GFR est non-AA 43 (L) >60 ml/min/1.73m2    Calcium 7.9 (L) 8.5 - 10.1 MG/DL   GLUCOSE, POC    Collection Time: 06/25/22  7:16 AM   Result Value Ref Range    Glucose (POC) 102 65 - 117 mg/dL    Performed by Pedro Luis Hill          PLAN:     S/P RIGHT TOTAL KNEE ARTHROPLASTY REVISION -Knee immobilizer, WBAT  -Mobilize and continue with PT/OT until discharged     Hemodynamics Hgb today is 6.9. One unit PRBC today then recheck hgb tomorrow     Wound Monitor postop dressing; no postop dressing changes necessary. Reinforce PRN. Post Operative Pain Pain Control: stable, mild-to-moderate joint symptoms intermittently, reasonably well controlled by current meds. DVT Prophylaxis Continue with SCD'S, Ankle Pump Exercises. Eliquis - hold AM dose per pharmacy, ok to restart this evening     Discharge Disposition Discharge plan: pending.        Signed By: Ponce White PA-C  June 25, 2022 8:06 AM

## 2022-06-25 NOTE — PROGRESS NOTES
Problem: Mobility Impaired (Adult and Pediatric)  Goal: *Acute Goals and Plan of Care (Insert Text)  Description: FUNCTIONAL STATUS PRIOR TO ADMISSION: Patient was independent and active without use of DME.    HOME SUPPORT PRIOR TO ADMISSION: The patient lived alone with daughter to provide assistance. Physical Therapy Goals  Initiated 6/24/2022  1. Patient will move from supine to sit and sit to supine  and scoot up and down in bed with modified independence within 7 day(s). 2.  Patient will transfer from bed to chair and chair to bed with modified independence using the least restrictive device within 7 day(s). 3.  Patient will perform sit to stand with modified independence within 7 day(s). 4.  Patient will ambulate with modified independence for > 150 feet with the least restrictive device within 7 day(s). 5.  Patient will ascend/descend 4 stairs with one handrail(s) with minimal assistance/contact guard assist within 7 day(s).   6/25/2022 1342 by Merna SANDRA  Outcome: Progressing Towards Goal     PHYSICAL THERAPY TREATMENT  Patient: Chris Lundberg (83 y.o. 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 ASSESSMENT  Patient continues with skilled PT services and is slowly progressing towards goals. Pt received supine in chair, requesting to use the bathroom and continued 8/10 pain. Pt presented receiving blood, cleared by Rn to mobilize to [facility] and back to bed. Pt able to tolerate STS Max A to RW with slow amb to place [facility] behind pt and assisted to seated. Pt continued with personal care after voiding in standing with RW for support and mod A.  Pt continued with SPT to bed with Max A to supine. Pt assisted in bed with HOB elevated an reporting 10/10 pain with RN in room at the time. Pt completed session with call bell within reach and all needs met at the time. Rn notified of session. .     Current Level of Function Impacting Discharge (mobility/balance): mod/max A for all mobiltiy, STS/SPT with RW    Other factors to consider for discharge: fall risk, pain management         PLAN :  Patient continues to benefit from skilled intervention to address the above impairments. Continue treatment per established plan of care. to address goals. Recommendation for discharge: (in order for the patient to meet his/her long term goals)  Therapy 3 hours per day 5-7 days per week VS HHPT pending progress    This discharge recommendation:  Has not yet been discussed the attending provider and/or case management    IF patient discharges home will need the following DME: to be determined (TBD)       SUBJECTIVE:   Patient stated I dont want to be a baby.     OBJECTIVE DATA SUMMARY:   Critical Behavior:  Neurologic State: Alert  Orientation Level: Oriented X4  Cognition: Appropriate decision making,Appropriate for age attention/concentration  Safety/Judgement: Awareness of environment      Functional Mobility Training:  Bed Mobility:  Sit to Supine: Maximum assistance  Scooting: Maximum assistance;Assist x2 (supine )     Transfers:  Sit to Stand: Assist x1;Maximum assistance  Stand to Sit: Assist x1; Moderate assistance  Bed to Chair: Moderate assistance (bed>BSC>chair)    Balance:  Sitting: Impaired  Sitting - Static: Good (unsupported)  Sitting - Dynamic: Not tested  Standing: Impaired; With support  Standing - Static: Constant support; Fair  Standing - Dynamic : Constant support;Fair;Poor    Ambulation/Gait Training:  Distance (ft): 5 Feet (ft) (SPT and retro amb )  Assistive Device: Brace/Splint;Gait belt;Walker, rolling  Ambulation - Level of Assistance: Contact guard assistance; Additional time  Gait Abnormalities: Antalgic;Decreased step clearance  Right Side Weight Bearing: As tolerated  Base of Support: Center of gravity altered;Shift to left  Stance: Right decreased  Speed/Henny: Pace decreased (<100 feet/min); Slow  Step Length: Left shortened;Right shortened  Pain Ratin/10 received , 10/10 post    Activity Tolerance:   Fair and requires rest breaks    After treatment patient left in no apparent distress:   Supine in bed and Call bell within reach    COMMUNICATION/COLLABORATION:   The patients plan of care was discussed with: Registered nurse.      Mariel Markham PTA   Time Calculation: 25 mins

## 2022-06-25 NOTE — PROGRESS NOTES
Occupational Therapy  6/25/2022    Chart reviewed. Attempted to see pt for OT. Pt currently receiving 1 unit PRBC for HgB 6.9. Will follow. Thank you.  Jacqui Loera MS, OTR/L

## 2022-06-26 LAB
ABO + RH BLD: NORMAL
BLD PROD TYP BPU: NORMAL
BLOOD GROUP ANTIBODIES SERPL: NORMAL
BPU ID: NORMAL
CROSSMATCH RESULT,%XM: NORMAL
GLUCOSE BLD STRIP.AUTO-MCNC: 116 MG/DL (ref 65–117)
GLUCOSE BLD STRIP.AUTO-MCNC: 91 MG/DL (ref 65–117)
GLUCOSE BLD STRIP.AUTO-MCNC: 93 MG/DL (ref 65–117)
GLUCOSE BLD STRIP.AUTO-MCNC: 96 MG/DL (ref 65–117)
HGB BLD-MCNC: 8 G/DL (ref 11.5–16)
SERVICE CMNT-IMP: NORMAL
SPECIMEN EXP DATE BLD: NORMAL
STATUS OF UNIT,%ST: NORMAL
UNIT DIVISION, %UDIV: 0

## 2022-06-26 PROCEDURE — 74011250637 HC RX REV CODE- 250/637: Performed by: PHYSICIAN ASSISTANT

## 2022-06-26 PROCEDURE — 74011250637 HC RX REV CODE- 250/637

## 2022-06-26 PROCEDURE — 97530 THERAPEUTIC ACTIVITIES: CPT

## 2022-06-26 PROCEDURE — 74011000250 HC RX REV CODE- 250: Performed by: PHYSICIAN ASSISTANT

## 2022-06-26 PROCEDURE — 82962 GLUCOSE BLOOD TEST: CPT

## 2022-06-26 PROCEDURE — 36415 COLL VENOUS BLD VENIPUNCTURE: CPT

## 2022-06-26 PROCEDURE — 97116 GAIT TRAINING THERAPY: CPT

## 2022-06-26 PROCEDURE — 85018 HEMOGLOBIN: CPT

## 2022-06-26 PROCEDURE — 65270000029 HC RM PRIVATE

## 2022-06-26 PROCEDURE — 74011250637 HC RX REV CODE- 250/637: Performed by: ORTHOPAEDIC SURGERY

## 2022-06-26 RX ORDER — HYDROMORPHONE HYDROCHLORIDE 2 MG/1
1 TABLET ORAL
Status: DISCONTINUED | OUTPATIENT
Start: 2022-06-26 | End: 2022-06-27 | Stop reason: HOSPADM

## 2022-06-26 RX ADMIN — SODIUM CHLORIDE, PRESERVATIVE FREE 10 ML: 5 INJECTION INTRAVENOUS at 07:32

## 2022-06-26 RX ADMIN — SODIUM CHLORIDE, PRESERVATIVE FREE 10 ML: 5 INJECTION INTRAVENOUS at 14:00

## 2022-06-26 RX ADMIN — ACETAMINOPHEN 1000 MG: 500 TABLET ORAL at 12:13

## 2022-06-26 RX ADMIN — APIXABAN 2.5 MG: 2.5 TABLET, FILM COATED ORAL at 08:08

## 2022-06-26 RX ADMIN — SENNOSIDES AND DOCUSATE SODIUM 1 TABLET: 50; 8.6 TABLET ORAL at 17:07

## 2022-06-26 RX ADMIN — APIXABAN 2.5 MG: 2.5 TABLET, FILM COATED ORAL at 17:06

## 2022-06-26 RX ADMIN — POTASSIUM CHLORIDE 10 MEQ: 750 TABLET, FILM COATED, EXTENDED RELEASE ORAL at 08:08

## 2022-06-26 RX ADMIN — ACETAMINOPHEN 1000 MG: 500 TABLET ORAL at 02:43

## 2022-06-26 RX ADMIN — PANTOPRAZOLE SODIUM 40 MG: 40 TABLET, DELAYED RELEASE ORAL at 07:32

## 2022-06-26 RX ADMIN — METOPROLOL SUCCINATE 100 MG: 25 TABLET, EXTENDED RELEASE ORAL at 07:32

## 2022-06-26 RX ADMIN — HYDROMORPHONE HYDROCHLORIDE 2 MG: 2 TABLET ORAL at 21:27

## 2022-06-26 RX ADMIN — HYDROMORPHONE HYDROCHLORIDE 1 MG: 2 TABLET ORAL at 09:13

## 2022-06-26 RX ADMIN — SODIUM CHLORIDE, PRESERVATIVE FREE 10 ML: 5 INJECTION INTRAVENOUS at 21:27

## 2022-06-26 RX ADMIN — LEVETIRACETAM 500 MG: 500 TABLET, FILM COATED ORAL at 21:27

## 2022-06-26 RX ADMIN — ACETAMINOPHEN 1000 MG: 500 TABLET ORAL at 17:01

## 2022-06-26 RX ADMIN — HYDROMORPHONE HYDROCHLORIDE 2 MG: 2 TABLET ORAL at 01:43

## 2022-06-26 RX ADMIN — POTASSIUM CHLORIDE 10 MEQ: 750 TABLET, FILM COATED, EXTENDED RELEASE ORAL at 17:06

## 2022-06-26 RX ADMIN — POLYETHYLENE GLYCOL 3350 17 G: 17 POWDER, FOR SOLUTION ORAL at 08:09

## 2022-06-26 RX ADMIN — SPIRONOLACTONE 25 MG: 25 TABLET ORAL at 07:32

## 2022-06-26 RX ADMIN — ATORVASTATIN CALCIUM 20 MG: 20 TABLET, FILM COATED ORAL at 08:08

## 2022-06-26 RX ADMIN — LEVETIRACETAM 500 MG: 500 TABLET, FILM COATED ORAL at 08:08

## 2022-06-26 RX ADMIN — SENNOSIDES AND DOCUSATE SODIUM 1 TABLET: 50; 8.6 TABLET ORAL at 08:08

## 2022-06-26 NOTE — PROGRESS NOTES
Problem: Mobility Impaired (Adult and Pediatric)  Goal: *Acute Goals and Plan of Care (Insert Text)  Description: FUNCTIONAL STATUS PRIOR TO ADMISSION: Patient was independent and active without use of DME.    HOME SUPPORT PRIOR TO ADMISSION: The patient lived alone with daughter to provide assistance. Physical Therapy Goals  Initiated 6/24/2022  1. Patient will move from supine to sit and sit to supine  and scoot up and down in bed with modified independence within 7 day(s). 2.  Patient will transfer from bed to chair and chair to bed with modified independence using the least restrictive device within 7 day(s). 3.  Patient will perform sit to stand with modified independence within 7 day(s). 4.  Patient will ambulate with modified independence for > 150 feet with the least restrictive device within 7 day(s). 5.  Patient will ascend/descend 4 stairs with one handrail(s) with minimal assistance/contact guard assist within 7 day(s).   6/26/2022 1133 by Joshua Gonzalez PT  Outcome: Progressing Towards Goal   PHYSICAL THERAPY TREATMENT  Patient: Mariana Robison (63 y.o. 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 ASSESSMENT  Patient continues with skilled PT services and is progressing towards goals. Pt cleared by kunal and was calling out to use BSC. Assisted pt out of chair and onto [facility], then min assist for cleaning due to BM. Pt then ambulated 80 feet, slow pace but CGA with knee immob. And RW. Pt wth decreased weight shifting, or lateral shifting onto LLE. Assisted pt back to bed and educated on use of LLE to assist with lifting RLE.   Pt pulled up in bed with use of bed rails I. .     Current Level of Function Impacting Discharge (mobility/balance):pt progressing well with mobility, balance and distance. Other factors to consider for discharge: pt lives with son, has assistance          PLAN :  Patient continues to benefit from skilled intervention to address the above impairments. Continue treatment per established plan of care. to address goals. Recommendation for discharge: (in order for the patient to meet his/her long term goals)  Physical therapy at least 2 days/week in the home     This discharge recommendation:  Has not yet been discussed the attending provider and/or case management    IF patient discharges home will need the following DME: none pt has all equipment for discharge to home        SUBJECTIVE:   Patient stated I am feeling ok.     OBJECTIVE DATA SUMMARY:   Critical Behavior:  Neurologic State: Alert  Orientation Level: Oriented X4  Cognition: Appropriate decision making,Appropriate for age attention/concentration,Follows commands  Safety/Judgement: Awareness of environment  Functional Mobility Training:  Bed Mobility:      Min assist, mainly with RLE              Transfers:  Sit to Stand:  Moderate assistance               Balance:  Sitting: Intact  Ambulation/Gait Training:  Distance (ft): 80 Feet (ft)  Assistive Device: Walker, rolling;Gait belt (knee immobilier)  Ambulation - Level of Assistance: Contact guard assistance     Gait Description (WDL): Exceptions to WDL  Gait Abnormalities: Antalgic;Decreased step clearance  Right Side Weight Bearing: As tolerated (with knee immobilizer)     Base of Support: Widened;Shift to left  Stance: Right decreased  Speed/Henny: Slow  Step Length: Right shortened            Pain Rating:  Pt did  not have c/o pain    Activity Tolerance:   Good    After treatment patient left in no apparent distress:   Supine in bed, Call bell within reach, Caregiver / family present, and Side rails x 3    COMMUNICATION/COLLABORATION:   The patients plan of care was discussed with: Registered nurse.      Kristyn Menchaca PT   Time Calculation: 25 mins

## 2022-06-26 NOTE — PROGRESS NOTES
Problem: Mobility Impaired (Adult and Pediatric)  Goal: *Acute Goals and Plan of Care (Insert Text)  Description: FUNCTIONAL STATUS PRIOR TO ADMISSION: Patient was independent and active without use of DME.    HOME SUPPORT PRIOR TO ADMISSION: The patient lived alone with daughter to provide assistance. Physical Therapy Goals  Initiated 6/24/2022  1. Patient will move from supine to sit and sit to supine  and scoot up and down in bed with modified independence within 7 day(s). 2.  Patient will transfer from bed to chair and chair to bed with modified independence using the least restrictive device within 7 day(s). 3.  Patient will perform sit to stand with modified independence within 7 day(s). 4.  Patient will ambulate with modified independence for > 150 feet with the least restrictive device within 7 day(s). 5.  Patient will ascend/descend 4 stairs with one handrail(s) with minimal assistance/contact guard assist within 7 day(s). Outcome: Progressing Towards Goal   PHYSICAL THERAPY TREATMENT  Patient: Abisai Mena (53 y.o. 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 ASSESSMENT  Patient continues with skilled PT services and is progressing towards goals. Pt agreeable to participate. Pt needing max assist of LEs to edge of bed and increased time. Pt needing moderate assist, increased time and two attempts sit to stand from bed ( with bed elevated). Once up, genevieve to transfer to [facility], toileting I.  Min to mod assist sit to stand for gait with RW 50', with CGA, antalgic gait and weight shifting to the left.   Pt  had no complaints of dizziness. Requested up in chair and reviewed policy to call for nsg as pt stated \"I am trying to figure out how to get up to use the commode without calling for the nurse. Pt educated on necessity of calling for nsg EVERY time she needs to get up, due to safety concerns/high fall risk status. Pt agreed to call for nsg. Current Level of Function Impacting Discharge (mobility/balance): pt needing assist for RLE due to pain and knee immobilizer in and out of bed. Other factors to consider for discharge: pt lives with her son, and has help from family nearby         PLAN :  Patient continues to benefit from skilled intervention to address the above impairments. Continue treatment per established plan of care. to address goals. Recommendation for discharge: (in order for the patient to meet his/her long term goals)  Physical therapy at least 2 days/week in the home     This discharge recommendation:  Has not yet been discussed the attending provider and/or case management    IF patient discharges home will need the following DME: patient owns DME required for discharge       SUBJECTIVE:   Patient stated I feel much better than yesterday. Rolan Hernandez    OBJECTIVE DATA SUMMARY:   Critical Behavior:  Neurologic State: Alert  Orientation Level: Oriented X4  Cognition: Appropriate decision making,Appropriate for age attention/concentration,Follows commands  Safety/Judgement: Awareness of environment  Functional Mobility Training:  Bed Mobility:      Pt needing max assist for LE management fili RLE              Transfers:  Sit to Stand:  Moderate assistance      Balance:  Sitting: Intact  Ambulation/Gait Training:  Distance (ft): 50 Feet (ft)  Assistive Device: Gait belt;Walker, rolling  Ambulation - Level of Assistance: Contact guard assistance     Gait Description (WDL): Exceptions to WDL  Gait Abnormalities: Antalgic;Decreased step clearance;Trunk sway increased  Right Side Weight Bearing: As tolerated (with knee immobilizer)     Base of Support: Widened;Shift to left  Stance: Right decreased  Speed/Henny: Slow  Step Length: Right shortened           Pain Ratin/10    Activity Tolerance:   Good    After treatment patient left in no apparent distress:   Sitting in chair and Call bell within reach    COMMUNICATION/COLLABORATION:   The patients plan of care was discussed with: Registered nurse.      Aly Yeager, PT   Time Calculation: 28 mins

## 2022-06-26 NOTE — PROGRESS NOTES
Orthopaedics Daily Progress Note                            Date of Surgery:  6/22/2022      Patient: Karel Muhammad   YOB: 1950  Age: 66 y.o. SUBJECTIVE:   4 Days Post-Op following RIGHT TOTAL KNEE ARTHROPLASTY REVISION. The patient's post operative pain is controlled. No CP/SOB. No N/V. Received one unit PRBC yesteday. OBJECTIVE:     Vital Signs:      Visit Vitals  BP (!) 151/72 (BP 1 Location: Left upper arm, BP Patient Position: At rest;Lying)   Pulse 75   Temp 98.2 °F (36.8 °C)   Resp 16   Ht 5' 6\" (1.676 m)   Wt 195 lb 12.3 oz (88.8 kg)   SpO2 97%   BMI 31.60 kg/m²       Physical Exam:  General: A&Ox3. The patient is cooperative, and in no acute distress. Respiratory: Respirations are unlabored. Surgical site(s): dressing clean, dry  Musculoskeletal: Calves are soft, supple, and non-tender upon palpation. Motor 5/5. Neurological:  Neurovascularly intact with good dorsi and plantar flexion. Pulses symmetrical.    Laboratory Values:             Recent Results (from the past 12 hour(s))   GLUCOSE, POC    Collection Time: 06/25/22  9:44 PM   Result Value Ref Range    Glucose (POC) 127 (H) 65 - 117 mg/dL    Performed by Melani Ramsey    HEMOGLOBIN    Collection Time: 06/26/22  1:53 AM   Result Value Ref Range    HGB 8.0 (L) 11.5 - 16.0 g/dL   GLUCOSE, POC    Collection Time: 06/26/22  6:50 AM   Result Value Ref Range    Glucose (POC) 91 65 - 117 mg/dL    Performed by Melani Ramsey          PLAN:     S/P RIGHT TOTAL KNEE ARTHROPLASTY REVISION -Knee immobilizer, WBAT  -Mobilize and continue with PT/OT until discharged     Hemodynamics Hgb today is 8. Continue to monitor. Wound Monitor postop dressing; no postop dressing changes necessary. Reinforce PRN. Post Operative Pain Pain Control: stable, mild-to-moderate joint symptoms intermittently, reasonably well controlled by current meds. DVT Prophylaxis Continue with SCD'S, Ankle Pump Exercises.  Eliquis      Discharge Disposition Discharge plan: pending.        Signed By: Demi Otto PA-C  June 26, 2022 8:06 AM

## 2022-06-27 VITALS
HEART RATE: 89 BPM | BODY MASS INDEX: 31.46 KG/M2 | HEIGHT: 66 IN | TEMPERATURE: 98.5 F | DIASTOLIC BLOOD PRESSURE: 79 MMHG | SYSTOLIC BLOOD PRESSURE: 153 MMHG | RESPIRATION RATE: 16 BRPM | OXYGEN SATURATION: 97 % | WEIGHT: 195.77 LBS

## 2022-06-27 LAB
ANION GAP SERPL CALC-SCNC: 6 MMOL/L (ref 5–15)
BASOPHILS # BLD: 0 K/UL (ref 0–0.1)
BASOPHILS NFR BLD: 0 % (ref 0–1)
BUN SERPL-MCNC: 20 MG/DL (ref 6–20)
BUN/CREAT SERPL: 20 (ref 12–20)
CALCIUM SERPL-MCNC: 8.9 MG/DL (ref 8.5–10.1)
CHLORIDE SERPL-SCNC: 111 MMOL/L (ref 97–108)
CO2 SERPL-SCNC: 22 MMOL/L (ref 21–32)
CREAT SERPL-MCNC: 1 MG/DL (ref 0.55–1.02)
DIFFERENTIAL METHOD BLD: ABNORMAL
EOSINOPHIL # BLD: 0.3 K/UL (ref 0–0.4)
EOSINOPHIL NFR BLD: 3 % (ref 0–7)
ERYTHROCYTE [DISTWIDTH] IN BLOOD BY AUTOMATED COUNT: 14.8 % (ref 11.5–14.5)
GLUCOSE BLD STRIP.AUTO-MCNC: 97 MG/DL (ref 65–117)
GLUCOSE BLD STRIP.AUTO-MCNC: 98 MG/DL (ref 65–117)
GLUCOSE SERPL-MCNC: 91 MG/DL (ref 65–100)
HCT VFR BLD AUTO: 26.9 % (ref 35–47)
HGB BLD-MCNC: 8.8 G/DL (ref 11.5–16)
IMM GRANULOCYTES # BLD AUTO: 0.2 K/UL (ref 0–0.04)
IMM GRANULOCYTES NFR BLD AUTO: 1 % (ref 0–0.5)
LYMPHOCYTES # BLD: 1.8 K/UL (ref 0.8–3.5)
LYMPHOCYTES NFR BLD: 16 % (ref 12–49)
MCH RBC QN AUTO: 31.3 PG (ref 26–34)
MCHC RBC AUTO-ENTMCNC: 32.7 G/DL (ref 30–36.5)
MCV RBC AUTO: 95.7 FL (ref 80–99)
MONOCYTES # BLD: 1.9 K/UL (ref 0–1)
MONOCYTES NFR BLD: 17 % (ref 5–13)
NEUTS SEG # BLD: 6.9 K/UL (ref 1.8–8)
NEUTS SEG NFR BLD: 63 % (ref 32–75)
NRBC # BLD: 0.03 K/UL (ref 0–0.01)
NRBC BLD-RTO: 0.3 PER 100 WBC
PLATELET # BLD AUTO: 224 K/UL (ref 150–400)
PMV BLD AUTO: 9.7 FL (ref 8.9–12.9)
POTASSIUM SERPL-SCNC: 4.8 MMOL/L (ref 3.5–5.1)
RBC # BLD AUTO: 2.81 M/UL (ref 3.8–5.2)
SERVICE CMNT-IMP: NORMAL
SERVICE CMNT-IMP: NORMAL
SODIUM SERPL-SCNC: 139 MMOL/L (ref 136–145)
WBC # BLD AUTO: 11.2 K/UL (ref 3.6–11)

## 2022-06-27 PROCEDURE — 74011250637 HC RX REV CODE- 250/637: Performed by: PHYSICIAN ASSISTANT

## 2022-06-27 PROCEDURE — 80048 BASIC METABOLIC PNL TOTAL CA: CPT

## 2022-06-27 PROCEDURE — 74011250637 HC RX REV CODE- 250/637

## 2022-06-27 PROCEDURE — 36415 COLL VENOUS BLD VENIPUNCTURE: CPT

## 2022-06-27 PROCEDURE — 85025 COMPLETE CBC W/AUTO DIFF WBC: CPT

## 2022-06-27 PROCEDURE — 97116 GAIT TRAINING THERAPY: CPT

## 2022-06-27 PROCEDURE — 74011250637 HC RX REV CODE- 250/637: Performed by: ORTHOPAEDIC SURGERY

## 2022-06-27 PROCEDURE — 74011250636 HC RX REV CODE- 250/636

## 2022-06-27 PROCEDURE — 74011000250 HC RX REV CODE- 250: Performed by: PHYSICIAN ASSISTANT

## 2022-06-27 PROCEDURE — 97535 SELF CARE MNGMENT TRAINING: CPT

## 2022-06-27 PROCEDURE — 82962 GLUCOSE BLOOD TEST: CPT

## 2022-06-27 RX ORDER — HYDROMORPHONE HYDROCHLORIDE 2 MG/1
1-2 TABLET ORAL
Qty: 20 TABLET | Refills: 0 | Status: SHIPPED | OUTPATIENT
Start: 2022-06-27 | End: 2022-06-27

## 2022-06-27 RX ORDER — METOPROLOL SUCCINATE 25 MG/1
100 TABLET, EXTENDED RELEASE ORAL DAILY
Status: DISCONTINUED | OUTPATIENT
Start: 2022-06-27 | End: 2022-06-27 | Stop reason: HOSPADM

## 2022-06-27 RX ORDER — HEPARIN SODIUM,PORCINE/PF 10 UNIT/ML
10 SYRINGE (ML) INTRAVENOUS AS NEEDED
Status: DISCONTINUED | OUTPATIENT
Start: 2022-06-27 | End: 2022-06-27

## 2022-06-27 RX ORDER — POLYETHYLENE GLYCOL 3350 17 G/17G
17 POWDER, FOR SOLUTION ORAL DAILY
Qty: 14 PACKET | Refills: 0 | Status: SHIPPED | OUTPATIENT
Start: 2022-06-27 | End: 2022-07-11

## 2022-06-27 RX ORDER — HYDROMORPHONE HYDROCHLORIDE 2 MG/1
1-2 TABLET ORAL
Qty: 20 TABLET | Refills: 0 | Status: SHIPPED | OUTPATIENT
Start: 2022-06-27 | End: 2022-07-01 | Stop reason: SDUPTHER

## 2022-06-27 RX ORDER — ACETAMINOPHEN 325 MG/1
650 TABLET ORAL EVERY 6 HOURS
Qty: 100 TABLET | Refills: 0 | Status: SHIPPED | OUTPATIENT
Start: 2022-06-27

## 2022-06-27 RX ORDER — HEPARIN 100 UNIT/ML
300 SYRINGE INTRAVENOUS AS NEEDED
Status: DISCONTINUED | OUTPATIENT
Start: 2022-06-27 | End: 2022-06-27 | Stop reason: HOSPADM

## 2022-06-27 RX ORDER — SPIRONOLACTONE 25 MG/1
25 TABLET ORAL DAILY
Status: DISCONTINUED | OUTPATIENT
Start: 2022-06-27 | End: 2022-06-27 | Stop reason: HOSPADM

## 2022-06-27 RX ORDER — AMOXICILLIN 250 MG
1 CAPSULE ORAL 2 TIMES DAILY
Qty: 60 TABLET | Refills: 0 | Status: SHIPPED | OUTPATIENT
Start: 2022-06-27 | End: 2022-07-27

## 2022-06-27 RX ADMIN — LEVETIRACETAM 500 MG: 500 TABLET, FILM COATED ORAL at 08:58

## 2022-06-27 RX ADMIN — METOPROLOL SUCCINATE 100 MG: 25 TABLET, EXTENDED RELEASE ORAL at 08:57

## 2022-06-27 RX ADMIN — SODIUM CHLORIDE, PRESERVATIVE FREE 10 ML: 5 INJECTION INTRAVENOUS at 04:27

## 2022-06-27 RX ADMIN — ATORVASTATIN CALCIUM 20 MG: 20 TABLET, FILM COATED ORAL at 08:58

## 2022-06-27 RX ADMIN — ACETAMINOPHEN 1000 MG: 500 TABLET ORAL at 08:58

## 2022-06-27 RX ADMIN — POLYETHYLENE GLYCOL 3350 17 G: 17 POWDER, FOR SOLUTION ORAL at 08:57

## 2022-06-27 RX ADMIN — Medication 300 UNITS: at 14:29

## 2022-06-27 RX ADMIN — SPIRONOLACTONE 25 MG: 25 TABLET ORAL at 08:58

## 2022-06-27 RX ADMIN — SENNOSIDES AND DOCUSATE SODIUM 1 TABLET: 50; 8.6 TABLET ORAL at 08:58

## 2022-06-27 RX ADMIN — APIXABAN 2.5 MG: 2.5 TABLET, FILM COATED ORAL at 08:58

## 2022-06-27 RX ADMIN — HYDROMORPHONE HYDROCHLORIDE 2 MG: 2 TABLET ORAL at 06:57

## 2022-06-27 RX ADMIN — HYDROMORPHONE HYDROCHLORIDE 2 MG: 2 TABLET ORAL at 14:28

## 2022-06-27 RX ADMIN — PANTOPRAZOLE SODIUM 40 MG: 40 TABLET, DELAYED RELEASE ORAL at 06:50

## 2022-06-27 RX ADMIN — ACETAMINOPHEN 1000 MG: 500 TABLET ORAL at 14:28

## 2022-06-27 RX ADMIN — POTASSIUM CHLORIDE 10 MEQ: 750 TABLET, FILM COATED, EXTENDED RELEASE ORAL at 08:58

## 2022-06-27 NOTE — DISCHARGE INSTRUCTIONS
Discharge Instructions Knee Replacement  Dr. Rubi Theodore      Patient Name  Betzy Tyson  Date of procedure  6/22/2022    Procedure  Procedure(s):  RIGHT TOTAL KNEE ARTHROPLASTY REVISION  Surgeon  Surgeon(s) and Role:     * Zoey Camejo MD - Primary  Date of discharge: No discharge date for patient encounter. PCP: Bard Pavan MD    Follow up care   Follow up visit with Dr. Rubi Theodore in 3 weeks. Call [phone]  to make an appointment   You do staples in your knee incision. They will be taken out in 10-14 days by [address] [place] [address] staff. Activity at home   Take a short walk every hour; except at night when sleeping   Do your Home Exercise Program 3 times every day    After exercising lie down and elevate your leg on pillows for 15-30 minutes to decrease swelling   Refer to your patient notebook for more information    Bathing and caring for your incision   You may take a shower with your waterproof dressing on your knee.  The waterproof dressing is to stay on your knee for 7 days.  On the 7th day have someone gently peel the dressing off by lifting the edge and stretching it to break the seal.   You may then leave your incision open to air unless you see drainage from your knee. Preventing blood clots   Take Eliquis every day as prescribed.  Call Dr. Rubi Theodore if you have side effects of blood thinning medication: bleeding, bruising, upset stomach, or diarrhea.  Call Dr. Rubi Theodore for signs of a blood clot in your leg: calf pain, tenderness, redness, swelling of lower leg    Preventing lung congestion   Use your incentive spirometer 4 times a day; do 10 repetitions each time   Remember to keep the small blue ball between the two arrows when taking a slow, deep breath     Pain Management   Get up and walk a short distance to relieve pain and stiffness.  Place ice wrap on your knee except when you are walking.  The gel ice packs should be changed about every 4 hours   Elevate your leg on pillows for 15-30 minutes    Take Tylenol 650mg (take two 325mg tablets) every 6 hours for pain.  If needed, take a narcotic pain pill every 4-6 hours as prescribed.  Take all medications with a small amount of food.  As your pain decreases, take the narcotics less often or take ½ of a pill   Call Dr. Dago Ybarra if you have side effects from your narcotic pain medication: itching, drowsiness, dizziness, upset stomach, dry mouth, constipation or if you medication is not relieving your pain. Diet after surgery   You may resume your normal diet. Include vegetables, fruit, whole grains, lean meats, and low-fat dairy products. Eat food high in fiber    Drink plenty of fluids, including 8 cups of water daily   Take Senokot-s twice a day to prevent constipation    Avoid after surgery   Do not take any over-the-counter medication for pain except Tylenol   Do not take more than 3000mg (3 grams) of Tylenol in 24 hours.  Do not drink alcoholic beverages   Do not smoke   Do not drive until seen for follow up appointment   Do not place frozen gel pack directly on your skin. It can cause frostbite.  Do not take a tub bath, swim or get in a hot tub for 6 weeks  Prevention of falls and safety at home   Set up an area where you can rest comfortably leaving space around furniture to allow you to walk with your walker   Keep stairs, hallways and bathrooms well lit; especially at night   Arrange for care for your pets   Keep your home free of clutter      Call Dr. Dago Ybarra at [phone] for:   Pain that is not relieved by pain medication, ice and activity   Side effects of medications   Increased/spread of bruising   Warning signs of infection:  ? persistent fever greater than 100 degrees  ?  shaking or chills  ? increased redness, tenderness, swelling or drainage from incision  ? increased pain during activity or rest   Warning signs of a blood clot in your leg:  ? increased pain in your calf  ? tenderness or redness  ? increased swelling or knee, calf, ankle or foot    Call [phone] after 5pm or on a weekend.  The on call physician will return your phone call   Call your Primary Care Doctor for:    Concerns about your medical conditions such as diabetes, high blood pressure, asthma, congestive heart failure   Blood sugars greater than 180   Persistent headache or dizziness   Coughing or congestion   Constipation or diarrhea   Burning when you go to the bathroom   Abnormal heart rate (fast or slow)     Call 911 and go to the nearest hospital for:    Sudden increased shortness of breath   Sudden onset of chest pain   Difficulty breathing   Localized chest pain with coughing or taking a deep breath

## 2022-06-27 NOTE — DISCHARGE SUMMARY
Ortho Discharge Summary    Patient ID:  Mariana Robison  [id number]  female  66 y.o.  1950    Admit date: 6/22/2022    Discharge date: 6/27/2022    Admitting Physician: Sonja Maki MD     Consulting Physician(s):   Treatment Team: Attending Provider: Roni Nunez MD; Utilization Review: Rasheed Florence RN; Utilization Review: Willie Senior; Care Manager: Esteban Florence; Primary Nurse: Rajat Stiles RN; Occupational Therapist: Marcella Xiao OT; Physical Therapy Assistant: Clarisse Gaytan    Date of Surgery:   6/22/2022     Preoperative Diagnosis:  Prosthetic knee implant failure, subsequent encounter     Postoperative Diagnosis:   Prosthetic knee implant failure, subsequent encounter     Procedure(s):   RIGHT TOTAL KNEE ARTHROPLASTY REVISION     Anesthesia Type:   Spinal     Surgeon: Roni Nunez MD                            HPI:  Pt is a 66 y.o. female who has a history of Prosthetic knee implant failure, subsequent encounter   with pain and limitations of activities of daily living who presents at this time for a right RIGHT TOTAL KNEE ARTHROPLASTY REVISION following the failure of conservative management. PMH:   Past Medical History:   Diagnosis Date    [facility]     Arthritis     Diabetes ([facility]     GERD (gastroesophageal reflux disease)     Hypercholesterolemia     Hypertension     Ill-defined condition     cellulitis    Multiple myeloma ([facility]     WITH MGUS    Prediabetes     Seizures ([facility] 2016    PER NEUROLOGIST ABSENCE SEIZURES, patient states    Sleep apnea     TESTED-DOESN'T KNOW RESULTS    Stroke ([facility] 2006    TIA    Syncope and collapse     loop recorder placed 4/2016 (syncope of unknown etiology)    Thromboembolus ([facility]     ANKLE       Body mass index is 31.6 kg/m². : A BMI > 30 is classified as obesity and > 40 is classified as morbid obesity.      Medications upon admission :   Prior to Admission Medications   Prescriptions Last Dose Informant Patient Reported? Taking? Cholecalciferol, Vitamin D3, (VITAMIN D) 1,000 unit Cap 2022 at Unknown time Self Yes Yes   Sig: Take 2 Tablets by mouth Every morning. apixaban (ELIQUIS) 5 mg tablet   Yes Yes   Sig: Take 5 mg by mouth every twelve (12) hours. atorvastatin (LIPITOR) 20 mg tablet 2022 at 0730 Self Yes Yes   Sig: Take 20 mg by mouth Every morning. certolizumab pegoL (Cimzia) 400 mg/2 mL (200 mg/mL x 2) sykt injection 2022 at Unknown time  Yes Yes   Sig: by SubCUTAneous route every four (4) weeks. folic acid (FOLVITE) 1 mg tablet 2022 at Unknown time Self Yes Yes   Sig: Take 1 mg by mouth daily. levETIRAcetam (KEPPRA XR) 500 mg ER tablet 2022 at Unknown time Self Yes Yes   Sig: Take 1,000 mg by mouth nightly. metFORMIN (GLUCOPHAGE) 500 mg tablet 2022  Yes No   Sig: nightly. methotrexate (RHEUMATREX) 2.5 mg tablet 6/15/2022 Self Yes No   Sig: Take 22.5 mg by mouth every Wednesday. metoprolol succinate (TOPROL-XL) 100 mg tablet 2022 at 0730  Yes Yes   Sig: Take  by mouth Every morning. oxymetazoline (AFRIN) 0.05 % nasal mist   Yes No   Si Sprays two (2) times daily as needed for Congestion. pantoprazole (PROTONIX) 40 mg tablet 2022 at 0730  Yes Yes   Sig: Take 40 mg by mouth Every morning. potassium chloride SR (KLOR-CON 10) 10 mEq tablet 2022 at Unknown time Self Yes Yes   Sig: Take 10 mEq by mouth two (2) times a day. spironolactone (ALDACTONE) 25 mg tablet 2022 at Unknown time  Yes Yes   Sig: Every morning. Facility-Administered Medications: None        Allergies:  No Known Allergies     Hospital Course: The patient underwent surgery. Complications:  None; patient tolerated the procedure well. Immobilized placed in immediate post operative period. EBL: 200 mL. Was taken to the PACU in stable condition and then transferred to the ortho floor.       On POD 1, therapy services evaluated patient with noted orthostatic hypotension. Fluid bolus ordered. Similar episode of POD 2 with 250 mL bolus at that time. Patient with persistent pain ongoing as well initially treated with scheduled tylenol, PRN oxycodone PO and PRN IV dilaudid. Due to patient's ongoing pain and need for IV analgesia, transitioned to PO dilaudid on POD 2 with gradual improvement in pain. At time of discharge, patient using scheduled tylenol with approximately one to two doses of PO PRN dilaudid 2 mg daily. Throughout hospitalization, patient's hemoglobin followed. Pre-operatively Hgb on 6/14: 11.5. On POD 1, 8.7; POD 2, 7.4; POD 3: 6.9 with 1 unit PRBCs ordered and and transfused. On recheck in POD 4, Hgb 8.0. At time of discharge on POD 5: 8.8. Patient continued on home medications from management of chronic illnesses including HTN. Transitioned to sliding scale insulin in hospital with resumption of home regimen for DM management at discharge. Patient therapy progress overall slow due to initial hypotension, blood transfusion, and need for immobilizer in post op period through follow up at minimum. On POD 5, patient able to complete stairs and full length of unit; cleared for discharge to home with home health services and patient assurance of 24/7 supervision at home to aid with ADLs and IADLs. Perioperative Antibiotics:  Ancef     Postoperative Pain Management:  PO dilaudid PRN  & scheduled tylenol     DVT Prophylaxis: Eliquis  5  mg PO BID, home dose    Postoperative transfusions:    Number of units banked PRBCs =   1     Post Op complications: none    Hemoglobin at discharge:    Lab Results   Component Value Date/Time    HGB 8.8 (L) 06/27/2022 07:01 AM    INR 1.4 (H) 06/14/2022 10:35 AM       Dressing was changed on POD # 5. Incision - clean, dry and intact. No significant erythema or swelling. Wound appears to be healing without any evidence of infection. Neurovascular exam found to be within normal limits. Physical Therapy started following surgery and participated in bed mobility, transfers and ambulation. Gait:  Gait  Base of Support: Widened,Shift to left  Speed/Henny: Slow  Step Length: Right shortened  Swing Pattern: Right asymmetrical  Stance: Right decreased  Gait Abnormalities: Antalgic,Decreased step clearance  Ambulation - Level of Assistance: Contact guard assistance  Distance (ft): 80 Feet (ft)  Assistive Device: Walker, rolling,Gait belt (knee immobilier)                   Discharged to: Home. Condition on Discharge:   stable    Discharge instructions:  - Anticoagulate with Eliquis  5  mg PO BID  - Take pain medications as prescribed  - Resume pre hospital diet      - Discharge activity: activity as tolerated  - Ambulate with assistive device as needed. - Weight bearing status as tolerated, knee immobilizer to be worn at least through follow up visit, surgical team to reassess at that time. - Wound Care Keep wound clean and dry. See discharge instruction sheet. -DISCHARGE MEDICATION LIST     Current Discharge Medication List      START taking these medications    Details   HYDROmorphone (DILAUDID) 2 mg tablet Take 0.5-1 Tablets by mouth every four (4) hours as needed for Pain for up to 7 days. Max Daily Amount: 12 mg.  Qty: 20 Tablet, Refills: 0  Start date: 6/27/2022, End date: 7/4/2022    Associated Diagnoses: S/P revision of total knee, right      acetaminophen (TYLENOL) 325 mg tablet Take 2 Tablets by mouth every six (6) hours. Qty: 100 Tablet, Refills: 0  Start date: 6/24/2022      senna-docusate (PERICOLACE) 8.6-50 mg per tablet Take 1 Tablet by mouth two (2) times a day for 30 days. Qty: 60 Tablet, Refills: 0  Start date: 6/24/2022, End date: 7/24/2022      polyethylene glycol (MIRALAX) 17 gram packet Take 1 Packet by mouth daily for 14 days.   Qty: 14 Packet, Refills: 0  Start date: 6/24/2022, End date: 7/8/2022         CONTINUE these medications which have NOT CHANGED    Details   apixaban (ELIQUIS) 5 mg tablet Take 5 mg by mouth every twelve (12) hours. certolizumab pegoL (Cimzia) 400 mg/2 mL (200 mg/mL x 2) sykt injection by SubCUTAneous route every four (4) weeks. metoprolol succinate (TOPROL-XL) 100 mg tablet Take  by mouth Every morning. pantoprazole (PROTONIX) 40 mg tablet Take 40 mg by mouth Every morning. spironolactone (ALDACTONE) 25 mg tablet Every morning. levETIRAcetam (KEPPRA XR) 500 mg ER tablet Take 1,000 mg by mouth nightly. folic acid (FOLVITE) 1 mg tablet Take 1 mg by mouth daily. atorvastatin (LIPITOR) 20 mg tablet Take 20 mg by mouth Every morning. Cholecalciferol, Vitamin D3, (VITAMIN D) 1,000 unit Cap Take 2 Tablets by mouth Every morning. potassium chloride SR (KLOR-CON 10) 10 mEq tablet Take 10 mEq by mouth two (2) times a day. oxymetazoline (AFRIN) 0.05 % nasal mist 2 Sprays two (2) times daily as needed for Congestion. metFORMIN (GLUCOPHAGE) 500 mg tablet nightly. methotrexate (RHEUMATREX) 2.5 mg tablet Take 22.5 mg by mouth every Wednesday.           per medical continuation form      -Follow up in office in 3-4 weeks with Dr. Dago Ybarra  -Call for follow up with PCP      Signed:  Lakshmi De Oliveira NP  Orthopaedic Nurse Practitioner    6/27/2022  12:31 PM

## 2022-06-27 NOTE — PROGRESS NOTES
Problem: Self Care Deficits Care Plan (Adult)  Goal: *Acute Goals and Plan of Care (Insert Text)  Description: FUNCTIONAL STATUS PRIOR TO ADMISSION: Patient was independent and active without use of DME.     HOME SUPPORT: The patient lives alone with daughter in the area. Occupational Therapy Goals  Initiated 6/23/2022  1. Patient will perform grooming with modified independence within 7 day(s). 2.  Patient will perform bathing with modified independence within 7 day(s). 3.  Patient will perform upper body dressing and lower body dressing with modified independence within 7 day(s). 4.  Patient will perform toilet transfers with modified independence within 7 day(s). 5.  Patient will perform all aspects of toileting with modified independence within 7 day(s). 6.  Patient will utilize energy conservation techniques during functional activities with verbal cues within 7 day(s). Outcome: Progressing Towards Goal     OCCUPATIONAL THERAPY TREATMENT  Patient: Dorothea Byrnes (42 y.o. female)  Date: 6/27/2022  Diagnosis: Prosthetic knee implant failure, subsequent encounter [T84.018D, Z96.659]  Failed total right knee replacement ([facility] Jenelle Love <principal problem not specified>  Procedure(s) (LRB):  RIGHT TOTAL KNEE ARTHROPLASTY REVISION (Right) 5 Days Post-Op  Precautions: Fall  Chart, occupational therapy assessment, plan of care, and goals were reviewed. ASSESSMENT  Patient continues with skilled OT services and is progressing towards goals. Pt progressing with activity tolerance, with good engagement with EOB/standing ADLs this date; however, R knee immobilizer continues to limit LB ADLs, as well as, sit to stand transfer independence. Once standing at , pt noted with good CGA level balance.   Pt reports she will have 24/7 Supervision upon discharge, with reporting therapist believing pt would benefit from [facility] and increased ADL/IADL Supervision at discharge, with acute OT to continue to follow during acute hospitalization. Current Level of Function Impacting Discharge (ADLs): Up to Max A (total A for knee immobilizer)    Other factors to consider for discharge: RLE knee immobilizer         PLAN :  Patient continues to benefit from skilled intervention to address the above impairments. Continue treatment per established plan of care to address goals. Recommend with staff: OOB meals, Active ADL engagement    Recommend next OT session: POC progression    Recommendation for discharge: (in order for the patient to meet his/her long term goals)  Occupational therapy at least 2 days/week in the home AND ensure assist and/or supervision for safety with ADLs/IADLs    This discharge recommendation:  Has been made in collaboration with the attending provider and/or case management    IF patient discharges home will need the following DME: patient owns DME required for discharge       SUBJECTIVE:   Patient stated i'll have plenty of help at home.     OBJECTIVE DATA SUMMARY:   Cognitive/Behavioral Status:  Neurologic State: Alert  Orientation Level: Oriented X4  Cognition: Appropriate decision making; Appropriate for age attention/concentration; Appropriate safety awareness  Perception: Appears intact  Perseveration: No perseveration noted  Safety/Judgement: Awareness of environment    Functional Mobility and Transfers for ADLs:  Bed Mobility:  Supine to Sit: Moderate assistance    Transfers:  Sit to Stand: Moderate assistance  Bed to Chair: Contact guard assistance    Pt educated on safe transfer techniques, with specific emphasis on proper hand placement to push up from seated surface rather than attempt to pull self up, fully positioning self in-front of desired seated location, feeling chair on back of legs and reaching back with 1-2 UE to slowly lower self to seated position. Balance:  Sitting: Intact  Standing: Impaired; With support  Standing - Static: Constant support;Good  Standing - Dynamic : Constant support;Good;Fair    ADL Intervention:       Grooming  Grooming Assistance: Set-up  Position Performed: Seated in chair    Upper Body Bathing  Bathing Assistance: Minimum assistance (posterior aspects)  Position Performed: Seated edge of bed    Type of Bath: Chlorhexidine (CHG)    Lower Body Bathing  Bathing Assistance: Maximum assistance  Perineal  : Moderate assistance (for posterior aspects during RW standing)  Position Performed: Standing (RW)  Lower Body : Maximum assistance  Position Performed: Seated edge of bed;Standing (RW)    Upper Body Dressing Assistance  Orthotics(Brace): Total assistance (dependent) (R knee immobilizer)  Hospital Gown: Set-up    Lower Body Dressing Assistance  Underpants: Maximum assistance (EOB/standing RW)         Cognitive Retraining  Safety/Judgement: Awareness of environment    Pain:  Pt with RLE pain with movement, did not quantify; RN aware and following    Activity Tolerance:   Good, Fair, and requires rest breaks    After treatment patient left in no apparent distress:   Sitting in chair, Call bell within reach, and Bed / chair alarm activated    COMMUNICATION/COLLABORATION:   The patients plan of care was discussed with: Physical therapy assistant, Registered nurse, and Case management.      Adair Durán OT  Time Calculation: 26 mins

## 2022-06-27 NOTE — PROGRESS NOTES
Ortho NP Note    POD# 5  s/p RIGHT TOTAL KNEE ARTHROPLASTY REVISION   Pt seen in room    Pt seated in chair, eating lunch. States the food is bad but with slowly increasing appetite, has been using supplemental shakes. Patient just completed therapy session with clearance for discharge to home from PT. Overall, patient reports she is able to tolerate more weight bearing to operative leg than in pre op period. Pain overall well controlled using PRN PO dilaudid. In discussing discharge, patient reports she will have 24/7 supervision at home with assistance for all ADLs, IADLs. VSS Afebrile.     Visit Vitals  BP (!) 153/79 (BP 1 Location: Left upper arm, BP Patient Position: At rest)   Pulse 89   Temp 98.5 °F (36.9 °C)   Resp 16   Ht 5' 6\" (1.676 m)   Wt 88.8 kg (195 lb 12.3 oz)   SpO2 97%   BMI 31.60 kg/m²       Voiding status: spontaneous void, BSC  Output (mL)  Urine Voided: 800 ml (06/27/22 0604)  Stool:  (Not Observed) (06/22/22 2306)  Last Bowel Movement Date: 06/26/22 (06/27/22 0855)  Estimated Blood Loss: 50 ml (06/23/22 2045)  Unmeasurable Output  Urine Occurrence(s): 1 (06/26/22 0930)  Stool Occurrence(s): 1 (06/26/22 0930)      Labs    Lab Results   Component Value Date/Time    HGB 8.8 (L) 06/27/2022 07:01 AM      Lab Results   Component Value Date/Time    INR 1.4 (H) 06/14/2022 10:35 AM      Lab Results   Component Value Date/Time    Sodium 139 06/27/2022 07:01 AM    Potassium 4.8 06/27/2022 07:01 AM    Chloride 111 (H) 06/27/2022 07:01 AM    CO2 22 06/27/2022 07:01 AM    Glucose 91 06/27/2022 07:01 AM    BUN 20 06/27/2022 07:01 AM    Creatinine 1.00 06/27/2022 07:01 AM    Calcium 8.9 06/27/2022 07:01 AM     Recent Glucose Results:   Lab Results   Component Value Date/Time    GLU 91 06/27/2022 07:01 AM    GLUCPOC 98 06/27/2022 11:20 AM    GLUCPOC 97 06/27/2022 06:48 AM    GLUCPOC 116 06/26/2022 09:26 PM           Body mass index is 31.6 kg/m². : A BMI > 30 is classified as obesity and > 40 is classified as morbid obesity. Aquacel dressing with stable drainage, replaced in prep for discharge. No active bleeding from incision line; incision with staples and sutures remains well aproximated with no signs of dehiscence or infection. Cryotherapy in place over incision  Calves soft and supple; No pain with passive stretch  Bilateral LEs warm, dry. 2+ DP pulses. Sensation and motor intact - PF/DF/EHL intact 5/5  Foot Pumps for mechanical DVT proph while in bed     PLAN:  1) PT: BID WBAT; knee immobilizer to be worn at least through follow up visit, surgical team to reassess at that time. 2) Anticoagulation:  Eliquis  resuming home dose at discharge  for DVT Prophylaxis. Pt reports she recently received 3 month supply via mail order and does not need rx at discharge. Encouraged early mobilization, bed exercises, and SCD use. 3) GI Prophylaxis - Protonix  4) Pain - Multimodal approach including cryotherapy, scheduled Tylenol with  PRN  PO dilaudid. 5) Post operative anemia: Hgb today: 8.8, appropriate increase after 1 unit PRBCs received on Saturday (POD 3). No active bleeding noted. Expected Acute blood loss post-op anemia, continue to monitor. 6) Hx of HTN: Current BP: 153/79. Continue home metoprolol, spironolactone. Recommending PCP follow up for BP control as pre op BP: 180s. 7) Hx of DM: Continue SS lispro in hospital; resume home regimen at discharge.    8) Readniess for discharge: patient reports      [x] Vital Signs stable    [x] Hgb stable    [x] + Voiding    [x] Wound intact, drainage minimal    [x] Tolerating PO intake     [x] Cleared by PT (OT if applicable)     [] Stair training completed (if applicable)    [] Independent / Contact Guard Assist (household distance)     [] Bed mobility     [] Car transfers     [] ADLs    [x] Adequate pain control on oral medication alone     Discharge to home with [facility] today pending ride from family     Jaylyn Xiao, NP  Available via [employer]     Addendum: 3013: Received phone call from patient requesting medication to be sent to [facility] in [facility] [place].  Called and cancelled rx's at [facility] and resent to [facility] as per patient request.

## 2022-06-27 NOTE — PROGRESS NOTES
I have reviewed discharge instructions with the patient. The patient verbalized understanding. Patient discharged with all personal belongings, personal cane and hard script.

## 2022-06-28 NOTE — PROGRESS NOTES
Physician Progress Note      Tabatha Pascual  CSN #:                  [id number]  :                       1950  ADMIT DATE:       2022 1:08 PM  [address] [place] DATE:        2022 3:28 PM  RESPONDING  PROVIDER #:        Serge Acuña NP          QUERY TEXT:    Patient noted to have Creat 1.23 with GFR 43. Creat was 1.35 with GFR 39 on pre-op labs. If possible, please document in progress notes and discharge summary if you are evaluating and/or treating any of the following: The medical record reflects the following:  Risk Factors: HTN, DM  Clinical Indicators: admitted for TKA revision. Pt noted to have Creat 1.23 with GFR 43 on admission, with Creat 1.35 and GFR 43 on pre-op labs. Treatment: 250 and 500 NS IVF Bolus', IVF at 125 cc/hr. Thank you,  Kalee Mccabe RN  Clinical Documentation  [phone]  Options provided:  -- CKD Stage 3a GFR 45-59  -- CKD Stage 3b GFR 30-44  -- Elevated Creat is clinically insignificant  -- Other - I will add my own diagnosis  -- Disagree - Not applicable / Not valid  -- Disagree - Clinically unable to determine / Unknown  -- Refer to Clinical Documentation Reviewer    PROVIDER RESPONSE TEXT:    Provider disagreed with this query. IV fluid bolus for hypotension in post op setting.  Requiring crystalloid and then blood product on POD 3 for volume replacement as opposed to being driven by Cr    Query created by: Chioma Calero on 2022 3:23 PM      Electronically signed by:  Serge Acuña NP 2022 8:27 AM

## 2022-07-01 DIAGNOSIS — Z96.651 S/P REVISION OF TOTAL KNEE, RIGHT: ICD-10-CM

## 2022-07-01 RX ORDER — HYDROMORPHONE HYDROCHLORIDE 2 MG/1
1-2 TABLET ORAL
Qty: 20 TABLET | Refills: 0 | Status: SHIPPED | OUTPATIENT
Start: 2022-07-01 | End: 2022-07-08

## 2022-07-11 ENCOUNTER — HOSPITAL ENCOUNTER (EMERGENCY)
Age: 72
Discharge: HOME | End: 2022-07-11
Attending: STUDENT IN AN ORGANIZED HEALTH CARE EDUCATION/TRAINING PROGRAM
Payer: MEDICARE

## 2022-07-11 ENCOUNTER — HOSPITAL ENCOUNTER (EMERGENCY)
Age: 72
Discharge: LEFT BEFORE DISCHARGE | End: 2022-07-11

## 2022-07-11 ENCOUNTER — APPOINTMENT (OUTPATIENT)
Dept: VASCULAR SURGERY | Age: 72
End: 2022-07-11
Attending: PHYSICIAN ASSISTANT
Payer: MEDICARE

## 2022-07-11 ENCOUNTER — TELEPHONE (OUTPATIENT)
Dept: ORTHOPEDIC SURGERY | Age: 72
End: 2022-07-11

## 2022-07-11 VITALS
DIASTOLIC BLOOD PRESSURE: 72 MMHG | RESPIRATION RATE: 20 BRPM | HEART RATE: 95 BPM | SYSTOLIC BLOOD PRESSURE: 131 MMHG | OXYGEN SATURATION: 98 % | TEMPERATURE: 97 F

## 2022-07-11 VITALS
HEIGHT: 66 IN | RESPIRATION RATE: 16 BRPM | WEIGHT: 196 LBS | HEART RATE: 78 BPM | BODY MASS INDEX: 31.5 KG/M2 | DIASTOLIC BLOOD PRESSURE: 74 MMHG | SYSTOLIC BLOOD PRESSURE: 138 MMHG | OXYGEN SATURATION: 98 % | TEMPERATURE: 98.8 F

## 2022-07-11 DIAGNOSIS — Z96.651 S/P REVISION OF TOTAL KNEE, RIGHT: Primary | ICD-10-CM

## 2022-07-11 DIAGNOSIS — M25.561 ACUTE PAIN OF RIGHT KNEE: Primary | ICD-10-CM

## 2022-07-11 PROCEDURE — 99284 EMERGENCY DEPT VISIT MOD MDM: CPT

## 2022-07-11 PROCEDURE — 93971 EXTREMITY STUDY: CPT

## 2022-07-11 PROCEDURE — 74011250637 HC RX REV CODE- 250/637: Performed by: PHYSICIAN ASSISTANT

## 2022-07-11 RX ORDER — HYDROMORPHONE HYDROCHLORIDE 2 MG/1
2 TABLET ORAL
Status: COMPLETED | OUTPATIENT
Start: 2022-07-11 | End: 2022-07-11

## 2022-07-11 RX ADMIN — HYDROMORPHONE HYDROCHLORIDE 2 MG: 2 TABLET ORAL at 18:13

## 2022-07-11 NOTE — TELEPHONE ENCOUNTER
Home health physical therapist called with concerns with patients knee, reported it was swollen hot to the touch. Advised physical therapist patient should go to the ED for an ultrasound to rule out DVT. Patient called to inform she was in the ED around 2pm, patient called the office back around 4pm asking if Dr. Geovanna White would \"do something\" (patient spoke to switch board). I returned the call in which patients son answer stating the ED would not do anything about the pain and they would be leaving. I advised the son that the patient should be getting an ultrasound done to rule out DVTs. Son responded with \" we told them, we cant make them do anything, what do you want us to do? \" I ended the call and put in a STAT order for an ultrasound. Noted they needed to reach out to the patient ASAP and that the patient was seen in the ED and was refused an ultrasound.

## 2022-07-11 NOTE — ED PROVIDER NOTES
66yo female with PMH of 13 knee surgeries, multiple myeloma, GERD, HTN, CVA, A-fib on Eliquis, most recent surgery was done on 6/22 by Dr. Joyce Rubio at [facility], here with son for evaluation of R knee pain and r/o DVT. She has had poorly controlled pain since her most recent surgery on 6/22, and PO Dilaudid 1-2mg tabs called in on 07/01 which she states doesn't help manage her pain. No F/C, N/V/D, redness, drainage from wound. Called the ortho office and was told to come to ER, went to [facility] but waited for 2 hours so left and came here. No CP, SOB.                 Past Medical History:   Diagnosis Date    A-fib ([address]     Arthritis     Diabetes ([address]     GERD (gastroesophageal reflux disease)     Hypercholesterolemia     Hypertension     Ill-defined condition     cellulitis    Multiple myeloma ([address]     WITH MGUS    Prediabetes     Seizures ([address] 2016    PER NEUROLOGIST ABSENCE SEIZURES, patient states    Sleep apnea     TESTED-DOESN'T KNOW RESULTS    Stroke ([address] 2006    TIA    Syncope and collapse     loop recorder placed 4/2016 (syncope of unknown etiology)    Thromboembolus ([address]     ANKLE       Past Surgical History:   Procedure Laterality Date    HX APPENDECTOMY      REMOVED DURING HYSTERECTOMY    HX COLONOSCOPY      HX DILATION AND CURETTAGE      HX HYSTERECTOMY  1996    HX IMPLANTABLE LOOP RECORDER  04/29/2016    HX KNEE ARTHROSCOPY  12/17/2009/ AND 2008    RIGHT KNEE    HX KNEE REPLACEMENT Right 2013 2016    HX ORTHOPAEDIC  9/09 AND  5/24/2011    RIGHT TOTAL KNEE, L-knee replacement    HX ORTHOPAEDIC  1/31/2011   10/24/2011    REMOVED KNEE REPLACEMENT    HX ORTHOPAEDIC Left 09/05/2017    HAND    HX ORTHOPAEDIC      RT TKR REVISIONS X4, LEFT TKR  REVISION X1    HX ROTATOR CUFF REPAIR Right 2008    HX SHOULDER ARTHROSCOPY  2003    RIGHT    HX WRIST FRACTURE TX Left     IR REPAIR CVC W/O PORT OR PUMP  @2020         Family History:   Problem Relation Age of Onset    Hypertension Mother    Swapnil Narayan Diabetes Mother     Heart Disease Mother         MI    Heart Attack Mother     Heart Disease Father         MI    Heart Attack Father     Heart Surgery Sister         STENT, VALVE SURGERY    Hypertension Sister     Diabetes Sister     Heart Attack Brother     Diabetes Brother     Diabetes Brother     Hypertension Brother     Diabetes Sister     Hypertension Sister     Elevated Lipids Sister     Heart Surgery Sister     Anesth Problems Neg Hx        Social History     Socioeconomic History    Marital status:      Spouse name: Not on file    Number of children: Not on file    Years of education: Not on file    Highest education level: Not on file   Occupational History    Not on file   Tobacco Use    Smoking status: Never Smoker    Smokeless tobacco: Never Used   Vaping Use    Vaping Use: Never used   Substance and Sexual Activity    Alcohol use: Never    Drug use: No    Sexual activity: Not on file   Other Topics Concern     Service Not Asked    Blood Transfusions Not Asked    Caffeine Concern Not Asked    Occupational Exposure Not Asked    Hobby Hazards Not Asked    Sleep Concern Not Asked    Stress Concern Not Asked    Weight Concern Not Asked    Special Diet Not Asked    Back Care Not Asked    Exercise Not Asked    Bike Helmet Not Asked    Seat Belt Not Asked    Self-Exams Not Asked   Social History Narrative    Not on file     Social Determinants of Health     Financial Resource Strain:     Difficulty of Paying Living Expenses: Not on file   Food Insecurity:     Worried About Running Out of Food in the Last Year: Not on file    Sherif of Food in the Last Year: Not on file   Transportation Needs:     Lack of Transportation (Medical): Not on file    Lack of Transportation (Non-Medical):  Not on file   Physical Activity:     Days of Exercise per Week: Not on file    Minutes of Exercise per Session: Not on file   Stress:     Feeling of Stress : Not on file   Social Connections:     Frequency of Communication with Friends and Family: Not on file    Frequency of Social Gatherings with Friends and Family: Not on file    Attends Yarsani Services: Not on file    Active Member of Clubs or Organizations: Not on file    Attends Club or Organization Meetings: Not on file    Marital Status: Not on file   Intimate Partner Violence:     Fear of Current or Ex-Partner: Not on file    Emotionally Abused: Not on file    Physically Abused: Not on file    Sexually Abused: Not on file   Housing Stability:     Unable to Pay for Housing in the Last Year: Not on file    Number of [place] in the Last Year: Not on file    Unstable Housing in the Last Year: Not on file         ALLERGIES: Patient has no known allergies. Review of Systems   Constitutional: Negative. Negative for activity change, chills, fatigue and unexpected weight change. HENT: Negative for trouble swallowing. Respiratory: Negative for cough, chest tightness, shortness of breath and wheezing. Cardiovascular: Negative. Negative for chest pain and palpitations. Gastrointestinal: Negative. Negative for abdominal pain, diarrhea, nausea and vomiting. Genitourinary: Negative. Negative for dysuria, flank pain, frequency and hematuria. Musculoskeletal: Negative. Negative for arthralgias, back pain, neck pain and neck stiffness. Skin: Negative. Negative for color change and rash. Neurological: Negative. Negative for dizziness, numbness and headaches. All other systems reviewed and are negative. Vitals:    07/11/22 1744   BP: 138/74   Pulse: 78   Resp: 16   Temp: 98.8 °F (37.1 °C)   SpO2: 98%   Weight: 88.9 kg (196 lb)   Height: 5' 6\" (1.676 m)            Physical Exam  Vitals and nursing note reviewed. Constitutional:       General: She is not in acute distress. Appearance: She is well-developed. She is not toxic-appearing or diaphoretic.    HENT:      Head: Normocephalic and atraumatic. Eyes:      General:         Right eye: No discharge. Left eye: No discharge. Conjunctiva/sclera: Conjunctivae normal.      Pupils: Pupils are equal, round, and reactive to light. Neck:      Trachea: No tracheal tenderness. Cardiovascular:      Rate and Rhythm: Normal rate and regular rhythm. Pulses: Normal pulses. Heart sounds: Normal heart sounds. No murmur heard. No friction rub. No gallop. Pulmonary:      Effort: Pulmonary effort is normal. No respiratory distress. Breath sounds: Normal breath sounds. No wheezing or rales. Chest:      Chest wall: No tenderness. Abdominal:      General: Bowel sounds are normal. There is no distension. Palpations: Abdomen is soft. Tenderness: There is no abdominal tenderness. There is no guarding or rebound. Musculoskeletal:         General: Tenderness present. Cervical back: Full passive range of motion without pain and normal range of motion. Skin:     General: Skin is warm and dry. Capillary Refill: Capillary refill takes less than 2 seconds. Findings: No abrasion, erythema or rash. Neurological:      Mental Status: She is alert and oriented to person, place, and time. Cranial Nerves: No cranial nerve deficit. Sensory: No sensory deficit. Coordination: Coordination normal.   Psychiatric:         Speech: Speech normal.         Behavior: Behavior normal.          MDM  Number of Diagnoses or Management Options  Diagnosis management comments:   Ddx: intractable pain, DVT       Amount and/or Complexity of Data Reviewed  Review and summarize past medical records: yes  Discuss the patient with other providers: yes    Patient Progress  Patient progress: stable         Procedures    I discussed patient's PMH, exam findings as well as careplan with the ER attending who agrees with care plan. Jesse Monroy PA-C      Pain controlled here, feeling better.  Has dilaudid PO at home ordered by her orthopedist. Neg DVT here, on Eliquis. Does not wish to be admitted for pain control, okay to f/u with Dr. Junior Thurston, will call office tomorrow to discuss. No external signs of infection, no fever. Daniel Angel PA-C    DISCHARGE NOTE:  8:14 PM  The patient has been re-evaluated and feeling much better and are stable for discharge. All available radiology and laboratory results have been reviewed with patient and/or available family. Patient and/or family verbally conveyed their understanding and agreement of the patient's signs, symptoms, diagnosis, treatment and prognosis and additionally agree to follow-up as recommended in the discharge instructions or to return to the Emergency Department should their condition change or worsen prior to their follow-up appointment. All questions have been answered and patient and/or available family express understanding. MEDICATIONS GIVEN:  Medications   HYDROmorphone (DILAUDID) tablet 2 mg (2 mg Oral Given 7/11/22 1813)       IMPRESSION:  1.  Acute pain of right knee        PLAN:  Follow-up Information     Follow up With Specialties Details Why Contact Info    Eliseo Stewart MD Orthopedic Surgery  your orthopedist for follow-up. [facility] [address]  [facility] [address]  [phone]          Current Discharge Medication List

## 2022-07-11 NOTE — ED TRIAGE NOTES
Pt had knee surgery on the 2nd of this month, c/o right leg pain/knee pain and bilateral groin pain,, pt unable to sit in a chair, pt appears uncomfortable

## 2022-07-11 NOTE — ED TRIAGE NOTES
Pt reports right sided total knee replacement the 22nd. States she had bilateral groin pain starting Saturday. Referred here by physical therapy due to increased swelling, warmth and redness. Denies chest pain or SOB.

## 2022-07-12 NOTE — DISCHARGE INSTRUCTIONS
Continue your pain medications as directed. Call Dr. Linnette Desouza to discuss further management of your knee pain.

## 2022-07-19 ENCOUNTER — OFFICE VISIT (OUTPATIENT)
Dept: ORTHOPEDIC SURGERY | Age: 72
End: 2022-07-19
Payer: MEDICARE

## 2022-07-19 VITALS — HEIGHT: 66 IN | BODY MASS INDEX: 31.5 KG/M2 | WEIGHT: 196 LBS

## 2022-07-19 DIAGNOSIS — Z96.651 S/P REVISION OF TOTAL KNEE, RIGHT: Primary | ICD-10-CM

## 2022-07-19 PROCEDURE — 99024 POSTOP FOLLOW-UP VISIT: CPT | Performed by: PHYSICIAN ASSISTANT

## 2022-07-19 RX ORDER — HYDROMORPHONE HYDROCHLORIDE 2 MG/1
2-4 TABLET ORAL
Qty: 50 TABLET | Refills: 0 | Status: SHIPPED | OUTPATIENT
Start: 2022-07-19 | End: 2022-08-02

## 2022-07-19 NOTE — PROGRESS NOTES
Trudy Steve (: 1950) is a 66 y.o. female, established patient, here for evaluation of the following chief complaint(s):  Surgical Follow-up         SUBJECTIVE/OBJECTIVE:    Trudy Steve (: 1950) is a 66 y.o. female. Right Total Knee Arthroplasty Revision - Right 2022     The patient is doing well at this time and is pleased with the results of her surgery and the progress which she has made to date. The patient had a patellar tendon repair and remains in a knee immobilizer at this time. Patient states she continues to have significant pain however, does not have any pain medication at this point. No Known Allergies    Current Outpatient Medications   Medication Sig    HYDROmorphone (Dilaudid) 2 mg tablet Take 1-2 Tablets by mouth every four (4) hours as needed for Pain for up to 14 days. Max Daily Amount: 24 mg. Indications: excessive pain    acetaminophen (TYLENOL) 325 mg tablet Take 2 Tablets by mouth every six (6) hours.  senna-docusate (PERICOLACE) 8.6-50 mg per tablet Take 1 Tablet by mouth two (2) times a day for 30 days.  apixaban (ELIQUIS) 5 mg tablet Take 5 mg by mouth every twelve (12) hours.  certolizumab pegoL (Cimzia) 400 mg/2 mL (200 mg/mL x 2) sykt injection by SubCUTAneous route every four (4) weeks.  oxymetazoline (AFRIN) 0.05 % nasal mist 2 Sprays two (2) times daily as needed for Congestion.  metoprolol succinate (TOPROL-XL) 100 mg tablet Take  by mouth Every morning.  metFORMIN (GLUCOPHAGE) 500 mg tablet nightly.  pantoprazole (PROTONIX) 40 mg tablet Take 40 mg by mouth Every morning.  spironolactone (ALDACTONE) 25 mg tablet Every morning.  levETIRAcetam (KEPPRA XR) 500 mg ER tablet Take 1,000 mg by mouth nightly.  folic acid (FOLVITE) 1 mg tablet Take 1 mg by mouth daily.  methotrexate (RHEUMATREX) 2.5 mg tablet Take 22.5 mg by mouth every Wednesday.  atorvastatin (LIPITOR) 20 mg tablet Take 20 mg by mouth Every morning.     Cholecalciferol, Vitamin D3, (VITAMIN D) 1,000 unit Cap Take 2 Tablets by mouth Every morning.  potassium chloride SR (KLOR-CON 10) 10 mEq tablet Take 10 mEq by mouth two (2) times a day. No current facility-administered medications for this visit. Social History     Socioeconomic History    Marital status:      Spouse name: Not on file    Number of children: Not on file    Years of education: Not on file    Highest education level: Not on file   Occupational History    Not on file   Tobacco Use    Smoking status: Never Smoker    Smokeless tobacco: Never Used   Vaping Use    Vaping Use: Never used   Substance and Sexual Activity    Alcohol use: Never    Drug use: No    Sexual activity: Not on file   Other Topics Concern     Service Not Asked    Blood Transfusions Not Asked    Caffeine Concern Not Asked    Occupational Exposure Not Asked    Hobby Hazards Not Asked    Sleep Concern Not Asked    Stress Concern Not Asked    Weight Concern Not Asked    Special Diet Not Asked    Back Care Not Asked    Exercise Not Asked    Bike Helmet Not Asked    Seat Belt Not Asked    Self-Exams Not Asked   Social History Narrative    Not on file     Social Determinants of Health     Financial Resource Strain:     Difficulty of Paying Living Expenses: Not on file   Food Insecurity:     Worried About Running Out of Food in the Last Year: Not on file    Sherif of Food in the Last Year: Not on file   Transportation Needs:     Lack of Transportation (Medical): Not on file    Lack of Transportation (Non-Medical):  Not on file   Physical Activity:     Days of Exercise per Week: Not on file    Minutes of Exercise per Session: Not on file   Stress:     Feeling of Stress : Not on file   Social Connections:     Frequency of Communication with Friends and Family: Not on file    Frequency of Social Gatherings with Friends and Family: Not on file    Attends Amish Services: Not on file    Active Member of Clubs or Organizations: Not on file    Attends Club or Organization Meetings: Not on file    Marital Status: Not on file   Intimate Partner Violence:     Fear of Current or Ex-Partner: Not on file    Emotionally Abused: Not on file    Physically Abused: Not on file    Sexually Abused: Not on file   Housing Stability:     Unable to Pay for Housing in the Last Year: Not on file    Number of [place] in the Last Year: Not on file    Unstable Housing in the Last Year: Not on file       Past Surgical History:   Procedure Laterality Date    HX APPENDECTOMY      REMOVED DURING HYSTERECTOMY    HX COLONOSCOPY      HX DILATION AND CURETTAGE      HX HYSTERECTOMY  1996    HX IMPLANTABLE LOOP RECORDER  04/29/2016    HX KNEE ARTHROSCOPY  12/17/2009/ AND 2008    RIGHT KNEE    HX KNEE REPLACEMENT Right 2013 2016    HX ORTHOPAEDIC  9/09 AND  5/24/2011    RIGHT TOTAL KNEE, L-knee replacement    HX ORTHOPAEDIC  1/31/2011   10/24/2011    REMOVED KNEE REPLACEMENT    HX ORTHOPAEDIC Left 09/05/2017    HAND    HX ORTHOPAEDIC      RT TKR REVISIONS X4, LEFT TKR  REVISION X1    HX ROTATOR CUFF REPAIR Right 2008    HX SHOULDER ARTHROSCOPY  2003    RIGHT    HX WRIST FRACTURE TX Left     IR REPAIR CVC W/O PORT OR PUMP  @2020       Family History   Problem Relation Age of Onset    Hypertension Mother     Diabetes Mother     Heart Disease Mother         MI    Heart Attack Mother     Heart Disease Father         MI    Heart Attack Father     Heart Surgery Sister         STENT, VALVE SURGERY    Hypertension Sister     Diabetes Sister     Heart Attack Brother     Diabetes Brother     Diabetes Brother     Hypertension Brother     Diabetes Sister     Hypertension Sister     Elevated Lipids Sister     Heart Surgery Sister     Anesth Problems Neg Hx         OB History    No obstetric history on file.             REVIEW OF SYSTEMS:    Patient denies any recent fever, chills, nausea, vomiting, chest pain, abdominal pain, blurred vision, dizziness or shortness of breath. Vitals:    Ht 5' 6\" (1.676 m)   Wt 196 lb (88.9 kg)   BMI 31.64 kg/m²    Body mass index is 31.64 kg/m². PHYSICAL EXAM:    The patient is alert and oriented x 3 and in no acute distress. Patient ambulates with a rolling walker. Patient has a knee immobilizer in place over the right knee. The postoperative wound is well healed without erythema or drainage. The operative knee remains in a knee immobilizer locked in full extension. Mild swelling of the operative knee is noted. There is no calf tenderness to palpation. Distal motor and sensation is intact. IMAGING:    Results from Appointment encounter on 07/19/22    XR KNEE RT 3 V    Narrative  AP standing, lateral and Merchant view digital radiographs of the right knee obtained in the office today were reviewed and demonstrate anatomic alignment of the long stemmed components with no evidence of hardware loosening or subsidence. Orders Placed This Encounter    XR KNEE RT 3 V     Standing Status:   Future     Number of Occurrences:   1     Standing Expiration Date:   7/20/2023    HYDROmorphone (Dilaudid) 2 mg tablet     Sig: Take 1-2 Tablets by mouth every four (4) hours as needed for Pain for up to 14 days. Max Daily Amount: 24 mg. Indications: excessive pain     Dispense:  50 Tablet     Refill:  0          ASSESSMENT/PLAN:      1. S/P revision of total knee, right  -     XR KNEE RT 3 V; Future  -     HYDROmorphone (Dilaudid) 2 mg tablet; Take 1-2 Tablets by mouth every four (4) hours as needed for Pain for up to 14 days. Max Daily Amount: 24 mg. Indications: excessive pain, Normal, Disp-50 Tablet, R-0        Below is the assessment and plan developed based on review of pertinent history, physical exam, labs, studies, and medications. Have discussed radiographic findings and have answered all patient questions to her satisfaction.   Refill the patient's pain medication which was sent electronically to the patient's preferred pharmacy. Have instructed the patient to remain in the knee immobilizer with the knee in full extension and to continue ambulating with support of walker until follow-up appointment with Dr. Donan Salazar. Have asked the patient to follow up in 3-4 weeks time for reevaluation with Dr Donna Salazar, sooner if she should develop any surgery related complications. The patient was asked to contact the office with any questions or concerns. The patient understands and agrees to the treatment plan as outlined above. Return in about 4 weeks (around 8/16/2022) for post op follow up. Dr. Donna Salazar was available for immediate consultation as needed. An electronic signature was used to authenticate this note.   -- Stacie Bustillos PA-C

## 2022-08-11 ENCOUNTER — OFFICE VISIT (OUTPATIENT)
Dept: ORTHOPEDIC SURGERY | Age: 72
End: 2022-08-11
Payer: MEDICARE

## 2022-08-11 VITALS — HEIGHT: 66 IN | WEIGHT: 196 LBS | BODY MASS INDEX: 31.5 KG/M2

## 2022-08-11 DIAGNOSIS — Z96.651 STATUS POST RIGHT KNEE REPLACEMENT: Primary | ICD-10-CM

## 2022-08-11 PROCEDURE — 99024 POSTOP FOLLOW-UP VISIT: CPT | Performed by: ORTHOPAEDIC SURGERY

## 2022-08-11 NOTE — PROGRESS NOTES
Progress note    Recheck right knee wound. Her incision is well-healed. At this point I do not want to start moving her if she had a tibial tubercle partial avulsion. Were doing 8 weeks straight. Follow-up 2 weeks and if at that point everything looks good we will start her on some physical therapy. Weight-bear as tolerated.

## 2022-08-25 ENCOUNTER — OFFICE VISIT (OUTPATIENT)
Dept: ORTHOPEDIC SURGERY | Age: 72
End: 2022-08-25
Payer: MEDICARE

## 2022-08-25 VITALS — WEIGHT: 196 LBS | BODY MASS INDEX: 31.5 KG/M2 | HEIGHT: 66 IN

## 2022-08-25 DIAGNOSIS — Z96.651 STATUS POST RIGHT KNEE REPLACEMENT: Primary | ICD-10-CM

## 2022-08-25 PROCEDURE — 99024 POSTOP FOLLOW-UP VISIT: CPT | Performed by: ORTHOPAEDIC SURGERY

## 2022-08-25 NOTE — PROGRESS NOTES
Galilea Galvez (: 1950) is a 66 y.o. female, patient, here for evaluation of the following chief complaint(s):  Surgical Follow-up (Post op follow up - RTKA 22)       HPI:    Follow-up status post multiple revisions last revision was 8 weeks ago on her right total knee    No Known Allergies    Current Outpatient Medications   Medication Sig    acetaminophen (TYLENOL) 325 mg tablet Take 2 Tablets by mouth every six (6) hours. apixaban (ELIQUIS) 5 mg tablet Take 5 mg by mouth every twelve (12) hours. certolizumab pegoL (Cimzia) 400 mg/2 mL (200 mg/mL x 2) sykt injection by SubCUTAneous route every four (4) weeks. oxymetazoline (AFRIN) 0.05 % nasal mist 2 Sprays two (2) times daily as needed for Congestion. metoprolol succinate (TOPROL-XL) 100 mg tablet Take  by mouth Every morning. metFORMIN (GLUCOPHAGE) 500 mg tablet nightly. pantoprazole (PROTONIX) 40 mg tablet Take 40 mg by mouth Every morning. spironolactone (ALDACTONE) 25 mg tablet Every morning. levETIRAcetam (KEPPRA XR) 500 mg ER tablet Take 1,000 mg by mouth nightly. folic acid (FOLVITE) 1 mg tablet Take 1 mg by mouth daily. methotrexate (RHEUMATREX) 2.5 mg tablet Take 22.5 mg by mouth every Wednesday. atorvastatin (LIPITOR) 20 mg tablet Take 20 mg by mouth Every morning. Cholecalciferol, Vitamin D3, (VITAMIN D) 1,000 unit Cap Take 2 Tablets by mouth Every morning. potassium chloride SR (KLOR-CON 10) 10 mEq tablet Take 10 mEq by mouth two (2) times a day. No current facility-administered medications for this visit.        Past Medical History:   Diagnosis Date    A-fib ([facility] [address]     Arthritis     Diabetes ([facility]     GERD (gastroesophageal reflux disease)     Hypercholesterolemia     Hypertension     Ill-defined condition     cellulitis    Multiple myeloma ([facility]     WITH MGUS    Prediabetes     [facility] 2016    PER NEUROLOGIST ABSENCE SEIZURES, patient states    Sleep apnea     TESTED-DOESN'T KNOW RESULTS Stroke [facility]) 2006    TIA    Syncope and collapse     loop recorder placed 4/2016 (syncope of unknown etiology)    Thromboembolus ([address]     ANKLE        Past Surgical History:   Procedure Laterality Date    HX APPENDECTOMY      REMOVED DURING HYSTERECTOMY    HX COLONOSCOPY      HX DILATION AND CURETTAGE      HX HYSTERECTOMY  1996    HX IMPLANTABLE LOOP RECORDER  04/29/2016    HX KNEE ARTHROSCOPY  12/17/2009/ AND 2008    RIGHT KNEE    HX KNEE REPLACEMENT Right 2013 2016    HX ORTHOPAEDIC  9/09 AND  5/24/2011    RIGHT TOTAL KNEE, L-knee replacement    HX ORTHOPAEDIC  1/31/2011   10/24/2011    REMOVED KNEE REPLACEMENT    HX ORTHOPAEDIC Left 09/05/2017    HAND    HX ORTHOPAEDIC      RT TKR REVISIONS X4, LEFT TKR  REVISION X1    HX ROTATOR CUFF REPAIR Right 2008    HX SHOULDER ARTHROSCOPY  2003    RIGHT    HX WRIST FRACTURE TX Left     IR REPAIR CVC W/O PORT OR PUMP  @2020       Family History   Problem Relation Age of Onset    Hypertension Mother     Diabetes Mother     Heart Disease Mother         MI    Heart Attack Mother     Heart Disease Father         MI    Heart Attack Father     Heart Surgery Sister         STENT, VALVE SURGERY    Hypertension Sister     Diabetes Sister     Heart Attack Brother     Diabetes Brother     Diabetes Brother     Hypertension Brother     Diabetes Sister     Hypertension Sister     Elevated Lipids Sister     Heart Surgery Sister     Anesth Problems Neg Hx         Social History     Socioeconomic History    Marital status:      Spouse name: Not on file    Number of children: Not on file    Years of education: Not on file    Highest education level: Not on file   Occupational History    Not on file   Tobacco Use    Smoking status: Never    Smokeless tobacco: Never   Vaping Use    Vaping Use: Never used   Substance and Sexual Activity    Alcohol use: Never    Drug use: No    Sexual activity: Not on file   Other Topics Concern     Service Not Asked    Blood Transfusions Not Asked    Caffeine Concern Not Asked    Occupational Exposure Not Asked    Hobby Hazards Not Asked    Sleep Concern Not Asked    Stress Concern Not Asked    Weight Concern Not Asked    Special Diet Not Asked    Back Care Not Asked    Exercise Not Asked    Bike Helmet Not Asked    Seat Belt Not Asked    Self-Exams Not Asked   Social History Narrative    Not on file     Social Determinants of Health     Financial Resource Strain: Not on file   Food Insecurity: Not on file   Transportation Needs: Not on file   Physical Activity: Not on file   Stress: Not on file   Social Connections: Not on file   Intimate Partner Violence: Not on file   Housing Stability: Not on file       ROS    Positive for: Musculoskeletal  Last edited by Tim Shah on 8/25/2022  9:31 AM.            Vitals:  Ht 5' 6\" (1.676 m)   Wt 196 lb (88.9 kg)   BMI 31.64 kg/m²    Body mass index is 31.64 kg/m². PHYSICAL EXAM:  On examination the patient has active extension with about a 5 degree lag. Flexion to about 50 degrees. Incision is well-healed. IMAGING:  None    ASSESSMENT/PLAN:  1. Status post right knee replacement  Follow-up 1 month. Hinged knee brace this was provided today. Were going to have her lock it when she is walking in case she falls. When she is doing physical therapy for 1 month we will do 0 to 70 degrees of flexion. An electronic signature was used to authenticate this note.   --Maikel Haines MD

## 2022-09-29 ENCOUNTER — OFFICE VISIT (OUTPATIENT)
Dept: ORTHOPEDIC SURGERY | Age: 72
End: 2022-09-29
Payer: MEDICARE

## 2022-09-29 VITALS — HEIGHT: 66 IN | BODY MASS INDEX: 31.5 KG/M2 | WEIGHT: 196 LBS

## 2022-09-29 DIAGNOSIS — Z96.651 S/P REVISION OF TOTAL KNEE, RIGHT: Primary | ICD-10-CM

## 2022-09-29 DIAGNOSIS — Z96.651 STATUS POST RIGHT KNEE REPLACEMENT: ICD-10-CM

## 2022-09-29 PROCEDURE — 99024 POSTOP FOLLOW-UP VISIT: CPT | Performed by: ORTHOPAEDIC SURGERY

## 2022-09-29 NOTE — PROGRESS NOTES
Galilea Galvez (: 1950) is a 66 y.o. female, patient, here for evaluation of the following chief complaint(s):  Surgical Follow-up (1 mo follow up - right total knee revision 22)       HPI:    3 months post revision of knee. No Known Allergies    Current Outpatient Medications   Medication Sig    acetaminophen (TYLENOL) 325 mg tablet Take 2 Tablets by mouth every six (6) hours. apixaban (ELIQUIS) 5 mg tablet Take 5 mg by mouth every twelve (12) hours. certolizumab pegoL (Cimzia) 400 mg/2 mL (200 mg/mL x 2) sykt injection by SubCUTAneous route every four (4) weeks. oxymetazoline (AFRIN) 0.05 % nasal mist 2 Sprays two (2) times daily as needed for Congestion. metoprolol succinate (TOPROL-XL) 100 mg tablet Take  by mouth Every morning. metFORMIN (GLUCOPHAGE) 500 mg tablet nightly. pantoprazole (PROTONIX) 40 mg tablet Take 40 mg by mouth Every morning. spironolactone (ALDACTONE) 25 mg tablet Every morning. levETIRAcetam (KEPPRA XR) 500 mg ER tablet Take 1,000 mg by mouth nightly. folic acid (FOLVITE) 1 mg tablet Take 1 mg by mouth daily. methotrexate (RHEUMATREX) 2.5 mg tablet Take 22.5 mg by mouth every Wednesday. atorvastatin (LIPITOR) 20 mg tablet Take 20 mg by mouth Every morning. Cholecalciferol, Vitamin D3, (VITAMIN D) 1,000 unit Cap Take 2 Tablets by mouth Every morning. potassium chloride SR (KLOR-CON 10) 10 mEq tablet Take 10 mEq by mouth two (2) times a day. No current facility-administered medications for this visit.        Past Medical History:   Diagnosis Date    [facility]     Arthritis     Diabetes ([facility]     GERD (gastroesophageal reflux disease)     Hypercholesterolemia     Hypertension     Ill-defined condition     cellulitis    Multiple myeloma ([facility]     WITH MGUS    Prediabetes     Seizures ([facility]     PER NEUROLOGIST ABSENCE SEIZURES, patient states    Sleep apnea     TESTED-DOESN'T KNOW RESULTS    Stroke ([facility]     TIA    Syncope and collapse     loop recorder placed 4/2016 (syncope of unknown etiology)    Thromboembolus ([address]     ANKLE        Past Surgical History:   Procedure Laterality Date    HX APPENDECTOMY      REMOVED DURING HYSTERECTOMY    HX COLONOSCOPY      HX DILATION AND CURETTAGE      HX HYSTERECTOMY  1996    HX IMPLANTABLE LOOP RECORDER  04/29/2016    HX KNEE ARTHROSCOPY  12/17/2009/ AND 2008    RIGHT KNEE    HX KNEE REPLACEMENT Right 2013 2016    HX ORTHOPAEDIC  9/09 AND  5/24/2011    RIGHT TOTAL KNEE, L-knee replacement    HX ORTHOPAEDIC  1/31/2011   10/24/2011    REMOVED KNEE REPLACEMENT    HX ORTHOPAEDIC Left 09/05/2017    HAND    HX ORTHOPAEDIC      RT TKR REVISIONS X4, LEFT TKR  REVISION X1    HX ROTATOR CUFF REPAIR Right 2008    HX SHOULDER ARTHROSCOPY  2003    RIGHT    HX WRIST FRACTURE TX Left     IR REPAIR CVC W/O PORT OR PUMP  @2020       Family History   Problem Relation Age of Onset    Hypertension Mother     Diabetes Mother     Heart Disease Mother         MI    Heart Attack Mother     Heart Disease Father         MI    Heart Attack Father     Heart Surgery Sister         [de-identified], VALVE SURGERY    Hypertension Sister     Diabetes Sister     Heart Attack Brother     Diabetes Brother     Diabetes Brother     Hypertension Brother     Diabetes Sister     Hypertension Sister     Elevated Lipids Sister     Heart Surgery Sister     Anesth Problems Neg Hx         Social History     Socioeconomic History    Marital status:      Spouse name: Not on file    Number of children: Not on file    Years of education: Not on file    Highest education level: Not on file   Occupational History    Not on file   Tobacco Use    Smoking status: Never    Smokeless tobacco: Never   Vaping Use    Vaping Use: Never used   Substance and Sexual Activity    Alcohol use: Never    Drug use: No    Sexual activity: Not on file   Other Topics Concern     Service Not Asked    Blood Transfusions Not Asked    Caffeine Concern Not Asked Occupational Exposure Not Asked    [address] Hazards Not Asked    Sleep Concern Not Asked    Stress Concern Not Asked    Weight Concern Not Asked    Special Diet Not Asked    Back Care Not Asked    Exercise Not Asked    Bike Helmet Not Asked    Seat Belt Not Asked    Self-Exams Not Asked   Social History Narrative    Not on file     Social Determinants of Health     Financial Resource Strain: Not on file   Food Insecurity: Not on file   Transportation Needs: Not on file   Physical Activity: Not on file   Stress: Not on file   Social Connections: Not on file   Intimate Partner Violence: Not on file   Housing Stability: Not on file       ROS    Positive for: Musculoskeletal  Last edited by Jasmyne Hilliard on 9/29/2022 10:22 AM.            Vitals:  Ht 5' 6\" (1.676 m)   Wt 196 lb (88.9 kg)   BMI 31.64 kg/m²    Body mass index is 31.64 kg/m². PHYSICAL EXAM:  On exam today the patient has about a 3 or 4 degree extensor lag. Flexes to 95 degrees no pain ambulates with just a cane. ASSESSMENT/PLAN:  1. S/P revision of total knee, right  -     REFERRAL TO DME  2. Status post right knee replacement    I am going to DC the bracing now. Follow-up in about 3 months. An electronic signature was used to authenticate this note.   --Shawn Duarte MD

## 2022-12-02 NOTE — ROUTINE PROCESS
Bedside shift change report given to Jack Martin RN (oncoming nurse) by Zoya Fernández RN(offgoing nurse). Report given with SBAR.
Patient: Fransico Rutledge MRN: [id number]  SSN: [id number]   YOB: 1950  Age: 66 y.o. Sex: female     Patient is status post Procedure(s):  RIGHT TOTAL KNEE ARTHROPLASTY REVISION. Surgeon(s) and Role:     Dee Dee Garvey MD - Primary    Local/Dose/Irrigation:  80 ml                Venous Access Device Upper chest (subclavicular area), left (Active)                             Dressing/Packing:  Incision 06/22/22 Leg Right-Dressing/Treatment: Cast padding; Ace wrap (aquacel ) (06/22/22 1934)      Other:  Knee immobilizer
2

## 2022-12-29 ENCOUNTER — OFFICE VISIT (OUTPATIENT)
Dept: ORTHOPEDIC SURGERY | Age: 72
End: 2022-12-29
Payer: MEDICARE

## 2022-12-29 VITALS — WEIGHT: 186.8 LBS | HEIGHT: 66 IN | BODY MASS INDEX: 30.02 KG/M2

## 2022-12-29 DIAGNOSIS — Z96.651 S/P REVISION OF TOTAL KNEE, RIGHT: Primary | ICD-10-CM

## 2022-12-29 PROCEDURE — G8400 PT W/DXA NO RESULTS DOC: HCPCS | Performed by: ORTHOPAEDIC SURGERY

## 2022-12-29 PROCEDURE — 3017F COLORECTAL CA SCREEN DOC REV: CPT | Performed by: ORTHOPAEDIC SURGERY

## 2022-12-29 PROCEDURE — G8427 DOCREV CUR MEDS BY ELIG CLIN: HCPCS | Performed by: ORTHOPAEDIC SURGERY

## 2022-12-29 PROCEDURE — G8536 NO DOC ELDER MAL SCRN: HCPCS | Performed by: ORTHOPAEDIC SURGERY

## 2022-12-29 PROCEDURE — G8417 CALC BMI ABV UP PARAM F/U: HCPCS | Performed by: ORTHOPAEDIC SURGERY

## 2022-12-29 PROCEDURE — 99213 OFFICE O/P EST LOW 20 MIN: CPT | Performed by: ORTHOPAEDIC SURGERY

## 2022-12-29 PROCEDURE — 1101F PT FALLS ASSESS-DOCD LE1/YR: CPT | Performed by: ORTHOPAEDIC SURGERY

## 2022-12-29 PROCEDURE — G8432 DEP SCR NOT DOC, RNG: HCPCS | Performed by: ORTHOPAEDIC SURGERY

## 2022-12-29 PROCEDURE — 1090F PRES/ABSN URINE INCON ASSESS: CPT | Performed by: ORTHOPAEDIC SURGERY

## 2022-12-29 PROCEDURE — 1123F ACP DISCUSS/DSCN MKR DOCD: CPT | Performed by: ORTHOPAEDIC SURGERY

## 2022-12-29 NOTE — LETTER
12/29/2022    Patient: Nadia Wise   YOB: 1950   Date of Visit: 12/29/2022     Yancy Hidalgo MD  [address] [facility] [address] [id number]  Via Fax: [phone]    Dear Yancy Hidalgo MD,      Thank you for referring Ms. Andrae Londono to [facility] for evaluation. My notes for this consultation are attached. If you have questions, please do not hesitate to call me. I look forward to following your patient along with you.       Sincerely,    Deana Chase MD

## 2022-12-29 NOTE — PROGRESS NOTES
Sylvia Urrutia (: 1950) is a 67 y.o. female, patient, here for evaluation of the following chief complaint(s):  Surgical Follow-up (RIGHT KNEE TOTAL REVISION )       HPI:    Patient is doing well with her knee revision. Does have mild extensor lag. Active full activities. She seems pleased at this point with her function. No Known Allergies    Current Outpatient Medications   Medication Sig    acetaminophen (TYLENOL) 325 mg tablet Take 2 Tablets by mouth every six (6) hours. apixaban (ELIQUIS) 5 mg tablet Take 5 mg by mouth every twelve (12) hours. certolizumab pegoL (Cimzia) 400 mg/2 mL (200 mg/mL x 2) sykt injection by SubCUTAneous route every four (4) weeks. oxymetazoline (AFRIN) 0.05 % nasal mist 2 Sprays two (2) times daily as needed for Congestion. metoprolol succinate (TOPROL-XL) 100 mg tablet Take  by mouth Every morning. metFORMIN (GLUCOPHAGE) 500 mg tablet nightly. pantoprazole (PROTONIX) 40 mg tablet Take 40 mg by mouth Every morning. spironolactone (ALDACTONE) 25 mg tablet Every morning. levETIRAcetam (KEPPRA XR) 500 mg ER tablet Take 1,000 mg by mouth nightly. folic acid (FOLVITE) 1 mg tablet Take 1 mg by mouth daily. methotrexate (RHEUMATREX) 2.5 mg tablet Take 22.5 mg by mouth every Wednesday. atorvastatin (LIPITOR) 20 mg tablet Take 20 mg by mouth Every morning. Cholecalciferol, Vitamin D3, (VITAMIN D) 1,000 unit Cap Take 2 Tablets by mouth Every morning. potassium chloride SR (KLOR-CON 10) 10 mEq tablet Take 10 mEq by mouth two (2) times a day. No current facility-administered medications for this visit.        Past Medical History:   Diagnosis Date    A-fib ([facility]     Arthritis     Diabetes ([facility]     GERD (gastroesophageal reflux disease)     Hypercholesterolemia     Hypertension     Ill-defined condition     cellulitis    Multiple myeloma ([facility]     WITH MGUS    Prediabetes     [facility]    PER NEUROLOGIST ABSENCE SEIZURES, patient states    Sleep apnea     TESTED-DOESN'T KNOW RESULTS    Stroke ([facility] 2006    TIA    Syncope and collapse     loop recorder placed 4/2016 (syncope of unknown etiology)    Thromboembolus ([facility]     ANKLE        Past Surgical History:   Procedure Laterality Date    HX APPENDECTOMY      REMOVED DURING HYSTERECTOMY    HX COLONOSCOPY      HX DILATION AND CURETTAGE      HX HYSTERECTOMY  1996    HX IMPLANTABLE LOOP RECORDER  04/29/2016    HX KNEE ARTHROSCOPY  12/17/2009/ AND 2008    RIGHT KNEE    HX KNEE REPLACEMENT Right 2013 2016    HX ORTHOPAEDIC  9/09 AND  5/24/2011    RIGHT TOTAL KNEE, L-knee replacement    HX ORTHOPAEDIC  1/31/2011   10/24/2011    REMOVED KNEE REPLACEMENT    HX ORTHOPAEDIC Left 09/05/2017    HAND    HX ORTHOPAEDIC      RT TKR REVISIONS X4, LEFT TKR  REVISION X1    HX ROTATOR CUFF REPAIR Right 2008    HX SHOULDER ARTHROSCOPY  2003    RIGHT    HX WRIST FRACTURE TX Left     IR REPAIR CVC W/O PORT OR PUMP  @2020       Family History   Problem Relation Age of Onset    Hypertension Mother     Diabetes Mother     Heart Disease Mother         MI    Heart Attack Mother     Heart Disease Father         MI    Heart Attack Father     Heart Surgery Sister         STENT, VALVE SURGERY    Hypertension Sister     Diabetes Sister     Heart Attack Brother     Diabetes Brother     Diabetes Brother     Hypertension Brother     Diabetes Sister     Hypertension Sister     Elevated Lipids Sister     Heart Surgery Sister     Anesth Problems Neg Hx         Social History     Socioeconomic History    Marital status:      Spouse name: Not on file    Number of children: Not on file    Years of education: Not on file    Highest education level: Not on file   Occupational History    Not on file   Tobacco Use    Smoking status: Never    Smokeless tobacco: Never   Vaping Use    Vaping Use: Never used   Substance and Sexual Activity    Alcohol use: Never    Drug use: No    Sexual activity: Not on file   Other Topics Concern  Service Not Asked    Blood Transfusions Not Asked    Caffeine Concern Not Asked    Occupational Exposure Not Asked    Hobby Hazards Not Asked    Sleep Concern Not Asked    Stress Concern Not Asked    Weight Concern Not Asked    Special Diet Not Asked    Back Care Not Asked    Exercise Not Asked    Bike Helmet Not Asked    Seat Belt Not Asked    Self-Exams Not Asked   Social History Narrative    Not on file     Social Determinants of Health     Financial Resource Strain: Not on file   Food Insecurity: Not on file   Transportation Needs: Not on file   Physical Activity: Not on file   Stress: Not on file   Social Connections: Not on file   Intimate Partner Violence: Not on file   Housing Stability: Not on file             Vitals:  Ht 5' 6\" (1.676 m)   Wt 186 lb 12.8 oz (84.7 kg)   BMI 30.15 kg/m²    Body mass index is 30.15 kg/m². PHYSICAL EXAM:  On physical examination patient's left knee has a 5 degree extensor lag. Flexes 115 degrees. Quads have 4 out of 5 strength. No instability. No warmth. No effusion. ASSESSMENT/PLAN:  1. S/P revision of total knee, right  Functionally she is doing very well for her. At this point I would recommend yearly follow-up and activities as she tolerates. She does understand there will be some limitations. An electronic signature was used to authenticate this note.   --Alton Monroy MD

## (undated) DEVICE — SOLUTION IRRIG 3000ML 0.9% SOD CHL USP UROMATIC PLAS CONT

## (undated) DEVICE — SLIM BODY SKIN STAPLER: Brand: APPOSE ULC

## (undated) DEVICE — HANDPIECE SET WITH BONE CLEANING TIP AND SUCTION TUBE: Brand: INTERPULSE

## (undated) DEVICE — PADDING CST 6IN STERILE --

## (undated) DEVICE — PADDING CAST SPEC 6INX4YD COT --

## (undated) DEVICE — 450 ML BOTTLE OF 0.05% CHLORHEXIDINE GLUCONATE IN 99.95% STERILE WATER FOR IRRIGATION, USP AND APPLICATOR.: Brand: IRRISEPT ANTIMICROBIAL WOUND LAVAGE

## (undated) DEVICE — HOOK LOCK LATEX FREE ELASTIC BANDAGE D/L 6INX10YD

## (undated) DEVICE — BLADE SAW W073XL276IN THK0031IN CUT THK0036IN REPL SAG

## (undated) DEVICE — SOLUTION IV 50ML 0.9% SOD CHL

## (undated) DEVICE — BLADE SAW W0.49XL3.15IN THK0.047IN CUT THK0.047IN REPL SAG

## (undated) DEVICE — T4 HOOD

## (undated) DEVICE — 4-PORT MANIFOLD: Brand: NEPTUNE 2

## (undated) DEVICE — SUTURE VCRL SZ 2-0 L36IN ABSRB UD L40MM CT 1/2 CIR J957H

## (undated) DEVICE — BANDAGE COMPR M W6INXL10YD WHT BGE VELC E MTRX HK AND LOOP

## (undated) DEVICE — 2108 SERIES SAGITTAL BLADE (18.6 X 0.8 X 73.8MM)

## (undated) DEVICE — GLOVE SURG SZ 8 L12IN FNGR THK94MIL STD WHT LTX FREE

## (undated) DEVICE — BOWL BNE CEM MIX SPAT CURET SMARTMIX CTS

## (undated) DEVICE — SUT ETHLN 3-0 18IN PS1 BLK --

## (undated) DEVICE — REM POLYHESIVE ADULT PATIENT RETURN ELECTRODE: Brand: VALLEYLAB

## (undated) DEVICE — SUTURE VCRL SZ 0 L36IN ABSRB VLT L40MM CT 1/2 CIR J358H

## (undated) DEVICE — GLOVE ORTHO 8   MSG9480

## (undated) DEVICE — CONTAINER,SPECIMEN,3OZ,OR STRL: Brand: MEDLINE

## (undated) DEVICE — DRAPE,EXTREMITY,89X128,STERILE: Brand: MEDLINE

## (undated) DEVICE — STERILE POLYISOPRENE POWDER-FREE SURGICAL GLOVES WITH EMOLLIENT COATING: Brand: PROTEXIS

## (undated) DEVICE — ZIMMER® STERILE DISPOSABLE TOURNIQUET CUFF WITH PLC, DUAL PORT, SINGLE BLADDER, 42 IN. (107 CM)

## (undated) DEVICE — Device: Brand: JELCO

## (undated) DEVICE — Device

## (undated) DEVICE — NDL SUT TAPR PT MAYO 0.5 CIR 5 --

## (undated) DEVICE — ZIMMER® STERILE DISPOSABLE TOURNIQUET CUFF WITH PLC, DUAL PORT, SINGLE BLADDER, 34 IN. (86 CM)

## (undated) DEVICE — SUTURE MCRYL SZ 4-0 L27IN ABSRB UD L24MM PS-1 3/8 CIR PRIM Y935H

## (undated) DEVICE — Z DISCONTINUED USE 2744636  DRESSING AQUACEL 14 IN ALG W3.5XL14IN POLYUR FLM CVR W/ HYDRCOLL

## (undated) DEVICE — SOLUTION IRRIG 3000ML 0.9% SOD CHL FLX CONT 0797208] ICU MEDICAL INC]

## (undated) DEVICE — MIXING SYS CEM BNE VAC -- QUICKVAC 15/BX

## (undated) DEVICE — GLOVE SURG SZ 65 L12IN FNGR THK94MIL STD WHT LTX FREE

## (undated) DEVICE — STERILE POLYISOPRENE POWDER-FREE SURGICAL GLOVES: Brand: PROTEXIS

## (undated) DEVICE — SUTURE VCRL SZ 2 L54IN ABSRB UD L65MM TP-1 1/2 CIR J880T

## (undated) DEVICE — SYR 20ML LL STRL LF --

## (undated) DEVICE — ATTUNE SOLO PINNING SYSTEM

## (undated) DEVICE — SOLUTION IRRIG 1000ML H2O STRL BLT

## (undated) DEVICE — SCRUB DRY SURG EZ SCRUB BRUSH PREOPERATIVE GRN

## (undated) DEVICE — SOLUTION IRRIG 1000ML STRL H2O USP PLAS POUR BTL

## (undated) DEVICE — 2108 SERIES SAGITTAL BLADE, NO OFFSET  (12.4 X 1.19 X 82.1MM)

## (undated) DEVICE — DRESSING HYDROFIBER AQUACEL AG ADVANTAGE 3.5X14 IN

## (undated) DEVICE — INFECTION CONTROL KIT SYS

## (undated) DEVICE — CONTAINER,SPECIMEN,STRL PATH,4OZ: Brand: MEDLINE

## (undated) DEVICE — SWAB CULT DBL W/O CHAR RAYON TIP AMIES GEL CLMN FOR COLL

## (undated) DEVICE — YANKAUER,BULB TIP,W/O VENT,RIGID,STERILE: Brand: MEDLINE

## (undated) DEVICE — BANDAGE COMPR ELASTIC 5 YDX6 IN

## (undated) DEVICE — TRAY CATH 16F URIN MTR LTX -- CONVERT TO ITEM 363111

## (undated) DEVICE — 3M™ IOBAN™ 2 ANTIMICROBIAL INCISE DRAPE 6651EZ: Brand: IOBAN™ 2

## (undated) DEVICE — TOTAL JOINT - SMH: Brand: MEDLINE INDUSTRIES, INC.

## (undated) DEVICE — SYRINGE MED 20ML STD CLR PLAS LUERLOCK TIP N CTRL DISP

## (undated) DEVICE — SUTURE STRATAFIX SYMMETRIC PDS + SZ 1 L18IN ABSRB VLT L48MM SXPP1A400

## (undated) DEVICE — NEEDLE HYPO 18GA L1.5IN PNK S STL HUB POLYPR SHLD REG BVL

## (undated) DEVICE — HYPODERMIC SAFETY NEEDLE: Brand: MAGELLAN

## (undated) DEVICE — DEVICE TRNSF SPIK STL 2008S] MICROTEK MEDICAL INC]

## (undated) DEVICE — GLOVE SURG SZ 65 L12IN FNGR THK79MIL GRN LTX FREE

## (undated) DEVICE — DEVON™ KNEE AND BODY STRAP 60" X 3" (1.5 M X 7.6 CM): Brand: DEVON

## (undated) DEVICE — SOLUTION SURG PREP 26 CC PURPREP

## (undated) DEVICE — NEEDLE HYPO 22GA L1.5IN BLK S STL HUB POLYPR SHLD REG BVL

## (undated) DEVICE — PREP SKN PREVAIL 40ML APPL --

## (undated) DEVICE — TOTAL TRAY, 16FR 10ML SIL FOLEY, URN: Brand: MEDLINE